# Patient Record
Sex: FEMALE | Race: WHITE | Employment: PART TIME | URBAN - METROPOLITAN AREA
[De-identification: names, ages, dates, MRNs, and addresses within clinical notes are randomized per-mention and may not be internally consistent; named-entity substitution may affect disease eponyms.]

---

## 2017-06-22 PROBLEM — I83.899 VARICOSE VEINS OF LOWER EXTREMITIES WITH COMPLICATIONS: Status: ACTIVE | Noted: 2017-06-22

## 2017-06-22 PROBLEM — I87.309 VENOUS HYPERTENSION OF LOWER EXTREMITY: Status: ACTIVE | Noted: 2017-06-22

## 2017-08-08 PROBLEM — I83.892 VARICOSE VEINS OF LEFT LOWER EXTREMITY WITH COMPLICATIONS: Status: ACTIVE | Noted: 2017-08-08

## 2018-02-15 PROBLEM — Z98.890 STATUS POST PHLEBECTOMY: Status: ACTIVE | Noted: 2018-02-15

## 2019-01-10 ENCOUNTER — HOSPITAL ENCOUNTER (OUTPATIENT)
Dept: GENERAL RADIOLOGY | Age: 49
Discharge: HOME | End: 2019-01-10
Payer: COMMERCIAL

## 2019-01-10 DIAGNOSIS — R13.14 DYSPHAGIA, PHARYNGOESOPHAGEAL: ICD-10-CM

## 2019-01-10 PROCEDURE — 74220 X-RAY XM ESOPHAGUS 1CNTRST: CPT

## 2019-02-20 ENCOUNTER — HOSPITAL ENCOUNTER (EMERGENCY)
Age: 49
Discharge: HOME | End: 2019-02-21
Attending: EMERGENCY MEDICINE
Payer: COMMERCIAL

## 2019-02-20 ENCOUNTER — APPOINTMENT (OUTPATIENT)
Dept: GENERAL RADIOLOGY | Age: 49
End: 2019-02-20
Payer: COMMERCIAL

## 2019-02-20 DIAGNOSIS — R07.9 CHEST PAIN, UNSPECIFIED TYPE: Primary | ICD-10-CM

## 2019-02-20 PROCEDURE — 99285 EMERGENCY DEPT VISIT HI MDM: CPT

## 2019-02-20 PROCEDURE — 93005 ELECTROCARDIOGRAM TRACING: CPT | Performed by: EMERGENCY MEDICINE

## 2019-02-20 PROCEDURE — 71046 X-RAY EXAM CHEST 2 VIEWS: CPT

## 2019-02-20 ASSESSMENT — PAIN SCALES - GENERAL: PAINLEVEL_OUTOF10: 8

## 2019-02-20 ASSESSMENT — PAIN DESCRIPTION - PAIN TYPE: TYPE: ACUTE PAIN

## 2019-02-20 ASSESSMENT — PAIN DESCRIPTION - LOCATION: LOCATION: CHEST

## 2019-02-21 ENCOUNTER — APPOINTMENT (OUTPATIENT)
Dept: CT IMAGING | Age: 49
End: 2019-02-21
Payer: COMMERCIAL

## 2019-02-21 VITALS
TEMPERATURE: 98 F | BODY MASS INDEX: 31.58 KG/M2 | HEART RATE: 72 BPM | HEIGHT: 64 IN | OXYGEN SATURATION: 96 % | RESPIRATION RATE: 13 BRPM | DIASTOLIC BLOOD PRESSURE: 96 MMHG | WEIGHT: 185 LBS | SYSTOLIC BLOOD PRESSURE: 162 MMHG

## 2019-02-21 LAB
ALBUMIN SERPL-MCNC: 3.5 GM/DL (ref 3.4–5)
ALP BLD-CCNC: 103 IU/L (ref 40–128)
ALT SERPL-CCNC: 13 U/L (ref 10–40)
ANION GAP SERPL CALCULATED.3IONS-SCNC: 10 MMOL/L (ref 4–16)
AST SERPL-CCNC: 10 IU/L (ref 15–37)
BASOPHILS ABSOLUTE: 0.1 K/CU MM
BASOPHILS RELATIVE PERCENT: 0.5 % (ref 0–1)
BILIRUB SERPL-MCNC: 0.3 MG/DL (ref 0–1)
BUN BLDV-MCNC: 15 MG/DL (ref 6–23)
CALCIUM SERPL-MCNC: 8.6 MG/DL (ref 8.3–10.6)
CHLORIDE BLD-SCNC: 101 MMOL/L (ref 99–110)
CO2: 24 MMOL/L (ref 21–32)
CREAT SERPL-MCNC: 0.6 MG/DL (ref 0.6–1.1)
DIFFERENTIAL TYPE: ABNORMAL
EOSINOPHILS ABSOLUTE: 0.2 K/CU MM
EOSINOPHILS RELATIVE PERCENT: 1.5 % (ref 0–3)
GFR AFRICAN AMERICAN: >60 ML/MIN/1.73M2
GFR NON-AFRICAN AMERICAN: >60 ML/MIN/1.73M2
GLUCOSE BLD-MCNC: 322 MG/DL (ref 70–99)
HCT VFR BLD CALC: 39.2 % (ref 37–47)
HEMOGLOBIN: 12.4 GM/DL (ref 12.5–16)
IMMATURE NEUTROPHIL %: 0.3 % (ref 0–0.43)
LYMPHOCYTES ABSOLUTE: 3.2 K/CU MM
LYMPHOCYTES RELATIVE PERCENT: 29.5 % (ref 24–44)
MAGNESIUM: 1.6 MG/DL (ref 1.8–2.4)
MCH RBC QN AUTO: 27.1 PG (ref 27–31)
MCHC RBC AUTO-ENTMCNC: 31.6 % (ref 32–36)
MCV RBC AUTO: 85.6 FL (ref 78–100)
MONOCYTES ABSOLUTE: 1 K/CU MM
MONOCYTES RELATIVE PERCENT: 8.7 % (ref 0–4)
NUCLEATED RBC %: 0 %
PDW BLD-RTO: 14.1 % (ref 11.7–14.9)
PLATELET # BLD: 328 K/CU MM (ref 140–440)
PMV BLD AUTO: 9.1 FL (ref 7.5–11.1)
POTASSIUM SERPL-SCNC: 3.7 MMOL/L (ref 3.5–5.1)
RBC # BLD: 4.58 M/CU MM (ref 4.2–5.4)
SEGMENTED NEUTROPHILS ABSOLUTE COUNT: 6.5 K/CU MM
SEGMENTED NEUTROPHILS RELATIVE PERCENT: 59.5 % (ref 36–66)
SODIUM BLD-SCNC: 135 MMOL/L (ref 135–145)
TOTAL IMMATURE NEUTOROPHIL: 0.03 K/CU MM
TOTAL NUCLEATED RBC: 0 K/CU MM
TOTAL PROTEIN: 6.2 GM/DL (ref 6.4–8.2)
TROPONIN T: <0.01 NG/ML
TROPONIN T: <0.01 NG/ML
TSH HIGH SENSITIVITY: 0.82 UIU/ML (ref 0.27–4.2)
WBC # BLD: 10.9 K/CU MM (ref 4–10.5)

## 2019-02-21 PROCEDURE — 93010 ELECTROCARDIOGRAM REPORT: CPT | Performed by: INTERNAL MEDICINE

## 2019-02-21 PROCEDURE — 83735 ASSAY OF MAGNESIUM: CPT

## 2019-02-21 PROCEDURE — 85025 COMPLETE CBC W/AUTO DIFF WBC: CPT

## 2019-02-21 PROCEDURE — 84443 ASSAY THYROID STIM HORMONE: CPT

## 2019-02-21 PROCEDURE — 71275 CT ANGIOGRAPHY CHEST: CPT

## 2019-02-21 PROCEDURE — 36415 COLL VENOUS BLD VENIPUNCTURE: CPT

## 2019-02-21 PROCEDURE — 6360000004 HC RX CONTRAST MEDICATION: Performed by: EMERGENCY MEDICINE

## 2019-02-21 PROCEDURE — 80053 COMPREHEN METABOLIC PANEL: CPT

## 2019-02-21 PROCEDURE — 84484 ASSAY OF TROPONIN QUANT: CPT

## 2019-02-21 RX ORDER — NICOTINE 21 MG/24HR
1 PATCH, TRANSDERMAL 24 HOURS TRANSDERMAL DAILY
Qty: 45 PATCH | Refills: 0 | Status: SHIPPED | OUTPATIENT
Start: 2019-02-21 | End: 2020-12-16

## 2019-02-21 RX ADMIN — IOPAMIDOL 100 ML: 755 INJECTION, SOLUTION INTRAVENOUS at 00:58

## 2019-02-21 ASSESSMENT — HEART SCORE: ECG: 0

## 2019-02-25 LAB
EKG ATRIAL RATE: 89 BPM
EKG DIAGNOSIS: NORMAL
EKG P-R INTERVAL: 200 MS
EKG Q-T INTERVAL: 362 MS
EKG QRS DURATION: 88 MS
EKG QTC CALCULATION (BAZETT): 440 MS
EKG R AXIS: 7 DEGREES
EKG T AXIS: 17 DEGREES
EKG VENTRICULAR RATE: 89 BPM

## 2019-06-28 ENCOUNTER — HOSPITAL ENCOUNTER (EMERGENCY)
Age: 49
Discharge: HOME | End: 2019-06-28
Payer: COMMERCIAL

## 2019-06-28 ENCOUNTER — APPOINTMENT (OUTPATIENT)
Dept: ULTRASOUND IMAGING | Age: 49
End: 2019-06-28
Payer: COMMERCIAL

## 2019-06-28 VITALS
HEIGHT: 64 IN | OXYGEN SATURATION: 100 % | TEMPERATURE: 98.2 F | SYSTOLIC BLOOD PRESSURE: 156 MMHG | DIASTOLIC BLOOD PRESSURE: 79 MMHG | BODY MASS INDEX: 31.58 KG/M2 | RESPIRATION RATE: 16 BRPM | WEIGHT: 185 LBS | HEART RATE: 72 BPM

## 2019-06-28 DIAGNOSIS — I83.92 VARICOSE VEINS OF LEFT LOWER EXTREMITY, UNSPECIFIED WHETHER COMPLICATED: Primary | ICD-10-CM

## 2019-06-28 PROCEDURE — 93971 EXTREMITY STUDY: CPT

## 2019-06-28 PROCEDURE — 99283 EMERGENCY DEPT VISIT LOW MDM: CPT

## 2019-06-28 ASSESSMENT — PAIN DESCRIPTION - LOCATION: LOCATION: LEG

## 2019-06-28 ASSESSMENT — PAIN DESCRIPTION - PAIN TYPE: TYPE: ACUTE PAIN

## 2019-06-28 ASSESSMENT — PAIN SCALES - GENERAL: PAINLEVEL_OUTOF10: 8

## 2019-06-28 ASSESSMENT — PAIN DESCRIPTION - ORIENTATION: ORIENTATION: LEFT

## 2019-06-28 NOTE — ED PROVIDER NOTES
Worry: Not on file     Inability: Not on file    Transportation needs:     Medical: Not on file     Non-medical: Not on file   Tobacco Use    Smoking status: Never Smoker    Smokeless tobacco: Never Used   Substance and Sexual Activity    Alcohol use: Yes     Comment: occasionally    Drug use: No    Sexual activity: Not on file   Lifestyle    Physical activity:     Days per week: Not on file     Minutes per session: Not on file    Stress: Not on file   Relationships    Social connections:     Talks on phone: Not on file     Gets together: Not on file     Attends Yazdanism service: Not on file     Active member of club or organization: Not on file     Attends meetings of clubs or organizations: Not on file     Relationship status: Not on file    Intimate partner violence:     Fear of current or ex partner: Not on file     Emotionally abused: Not on file     Physically abused: Not on file     Forced sexual activity: Not on file   Other Topics Concern    Not on file   Social History Narrative    Not on file     No current facility-administered medications for this encounter. Current Outpatient Medications   Medication Sig Dispense Refill    nicotine (NICODERM CQ) 21 MG/24HR Place 1 patch onto the skin daily 45 patch 0    TRULICITY 1.5 MB/6.8MA SOPN       acyclovir (ZOVIRAX) 400 MG tablet       magnesium oxide (MAG-OX) 400 MG tablet Take 400 mg by mouth daily      potassium chloride SA (K-DUR;KLOR-CON M) 20 MEQ tablet Take 20 mEq by mouth 2 times daily      vitamin D (MAXIMUM D3) 75545 UNITS CAPS capsule Take 10,000 Units by mouth once a week      butalbital-acetaminophen-caffeine (FIORICET) per tablet Take 1 tablet by mouth every 4 hours as needed for Pain. 180 tablet 0    naproxen sodium (ANAPROX) 550 MG tablet Take 550 mg by mouth 2 times daily (with meals).        Allergies   Allergen Reactions    Nubain [Nalbuphine Hcl]      Seizures      Secobarbital Sodium Anaphylaxis     SEIZURE    ablation and multiple phlebectomies FINDINGS: The visualized veins of the left lower extremity are patent and free of echogenic thrombus. The veins are normally compressible and have normal phasic flow. No evidence of DVT in the left lower extremity. MDM:  -  Patient seen and evaluated in the emergency department. -  Triage and nursing notes reviewed and incorporated. -  Old chart records reviewed and incorporated. -  Supervising physician was Dr. Constantin Velásquez. Patient was seen independently. -  Differential diagnosis includes:  DVT, superficial thrombophlebitis, cellulitis, Baker's cyst, lymphedema, aneurysm, hematoma, muscle strain, and others  -  Work-up included:  US  -  Results discussed with patient. Negative US. No evidence of infectious process. Compartments soft. We discussed FU with Dr. Jenise Wills in 1-2 days, return here as needed. She is agreeable with plan of care and disposition.  -  Disposition:  Home    Clinical Impression:  1.  Varicose veins of left lower extremity, unspecified whether complicated      (Please note that portions of this note may have been completed with a voice recognition program. Efforts were made to edit the dictations but occasionally words are mis-transcribed.)    TETE Saravia PA-C  06/28/19 5642

## 2019-07-13 PROBLEM — I80.02 SUPERFICIAL PHLEBITIS OF LEFT LEG: Status: ACTIVE | Noted: 2019-07-13

## 2020-08-09 ASSESSMENT — PAIN SCALES - GENERAL: PAINLEVEL_OUTOF10: 8

## 2020-08-09 ASSESSMENT — PAIN DESCRIPTION - LOCATION: LOCATION: CHEST

## 2020-08-09 ASSESSMENT — PAIN DESCRIPTION - PAIN TYPE: TYPE: ACUTE PAIN

## 2020-08-10 ENCOUNTER — HOSPITAL ENCOUNTER (EMERGENCY)
Age: 50
Discharge: HOME | End: 2020-08-10
Attending: EMERGENCY MEDICINE
Payer: COMMERCIAL

## 2020-08-10 ENCOUNTER — APPOINTMENT (OUTPATIENT)
Dept: CT IMAGING | Age: 50
End: 2020-08-10
Payer: COMMERCIAL

## 2020-08-10 ENCOUNTER — APPOINTMENT (OUTPATIENT)
Dept: GENERAL RADIOLOGY | Age: 50
End: 2020-08-10
Payer: COMMERCIAL

## 2020-08-10 VITALS
SYSTOLIC BLOOD PRESSURE: 178 MMHG | TEMPERATURE: 98.1 F | HEART RATE: 104 BPM | RESPIRATION RATE: 14 BRPM | OXYGEN SATURATION: 94 % | BODY MASS INDEX: 34.15 KG/M2 | HEIGHT: 64 IN | WEIGHT: 200 LBS | DIASTOLIC BLOOD PRESSURE: 99 MMHG

## 2020-08-10 LAB
ALBUMIN SERPL-MCNC: 4.3 GM/DL (ref 3.4–5)
ALP BLD-CCNC: 117 IU/L (ref 40–128)
ALT SERPL-CCNC: 22 U/L (ref 10–40)
ANION GAP SERPL CALCULATED.3IONS-SCNC: 11 MMOL/L (ref 4–16)
AST SERPL-CCNC: 18 IU/L (ref 15–37)
BASOPHILS ABSOLUTE: 0.1 K/CU MM
BASOPHILS RELATIVE PERCENT: 0.7 % (ref 0–1)
BILIRUB SERPL-MCNC: 0.5 MG/DL (ref 0–1)
BUN BLDV-MCNC: 10 MG/DL (ref 6–23)
CALCIUM SERPL-MCNC: 9.3 MG/DL (ref 8.3–10.6)
CHLORIDE BLD-SCNC: 96 MMOL/L (ref 99–110)
CO2: 24 MMOL/L (ref 21–32)
CREAT SERPL-MCNC: 0.6 MG/DL (ref 0.6–1.1)
DIFFERENTIAL TYPE: ABNORMAL
EOSINOPHILS ABSOLUTE: 0.1 K/CU MM
EOSINOPHILS RELATIVE PERCENT: 1.2 % (ref 0–3)
GFR AFRICAN AMERICAN: >60 ML/MIN/1.73M2
GFR NON-AFRICAN AMERICAN: >60 ML/MIN/1.73M2
GLUCOSE BLD-MCNC: 243 MG/DL (ref 70–99)
HCT VFR BLD CALC: 44.4 % (ref 37–47)
HEMOGLOBIN: 14.3 GM/DL (ref 12.5–16)
IMMATURE NEUTROPHIL %: 0.5 % (ref 0–0.43)
LYMPHOCYTES ABSOLUTE: 3.1 K/CU MM
LYMPHOCYTES RELATIVE PERCENT: 25.1 % (ref 24–44)
MCH RBC QN AUTO: 27.4 PG (ref 27–31)
MCHC RBC AUTO-ENTMCNC: 32.2 % (ref 32–36)
MCV RBC AUTO: 85.2 FL (ref 78–100)
MONOCYTES ABSOLUTE: 0.8 K/CU MM
MONOCYTES RELATIVE PERCENT: 6.9 % (ref 0–4)
NUCLEATED RBC %: 0 %
PDW BLD-RTO: 13.6 % (ref 11.7–14.9)
PLATELET # BLD: 369 K/CU MM (ref 140–440)
PMV BLD AUTO: 9.7 FL (ref 7.5–11.1)
POTASSIUM SERPL-SCNC: 4.2 MMOL/L (ref 3.5–5.1)
PRO-BNP: 83.41 PG/ML
RBC # BLD: 5.21 M/CU MM (ref 4.2–5.4)
SEGMENTED NEUTROPHILS ABSOLUTE COUNT: 8 K/CU MM
SEGMENTED NEUTROPHILS RELATIVE PERCENT: 65.6 % (ref 36–66)
SODIUM BLD-SCNC: 131 MMOL/L (ref 135–145)
TOTAL IMMATURE NEUTOROPHIL: 0.06 K/CU MM
TOTAL NUCLEATED RBC: 0 K/CU MM
TOTAL PROTEIN: 7.3 GM/DL (ref 6.4–8.2)
TROPONIN T: <0.01 NG/ML
WBC # BLD: 12.1 K/CU MM (ref 4–10.5)

## 2020-08-10 PROCEDURE — 71275 CT ANGIOGRAPHY CHEST: CPT

## 2020-08-10 PROCEDURE — 93010 ELECTROCARDIOGRAM REPORT: CPT | Performed by: INTERNAL MEDICINE

## 2020-08-10 PROCEDURE — 71046 X-RAY EXAM CHEST 2 VIEWS: CPT

## 2020-08-10 PROCEDURE — 6360000004 HC RX CONTRAST MEDICATION: Performed by: EMERGENCY MEDICINE

## 2020-08-10 PROCEDURE — 85025 COMPLETE CBC W/AUTO DIFF WBC: CPT

## 2020-08-10 PROCEDURE — 93005 ELECTROCARDIOGRAM TRACING: CPT | Performed by: EMERGENCY MEDICINE

## 2020-08-10 PROCEDURE — 84484 ASSAY OF TROPONIN QUANT: CPT

## 2020-08-10 PROCEDURE — 83880 ASSAY OF NATRIURETIC PEPTIDE: CPT

## 2020-08-10 PROCEDURE — 80053 COMPREHEN METABOLIC PANEL: CPT

## 2020-08-10 PROCEDURE — 99285 EMERGENCY DEPT VISIT HI MDM: CPT

## 2020-08-10 PROCEDURE — 6370000000 HC RX 637 (ALT 250 FOR IP): Performed by: EMERGENCY MEDICINE

## 2020-08-10 RX ORDER — ASPIRIN 81 MG/1
81 TABLET, CHEWABLE ORAL ONCE
Status: COMPLETED | OUTPATIENT
Start: 2020-08-10 | End: 2020-08-10

## 2020-08-10 RX ORDER — CYCLOBENZAPRINE HCL 10 MG
10 TABLET ORAL 3 TIMES DAILY PRN
Qty: 20 TABLET | Refills: 0 | Status: SHIPPED | OUTPATIENT
Start: 2020-08-10 | End: 2020-08-17

## 2020-08-10 RX ORDER — SODIUM CHLORIDE 0.9 % (FLUSH) 0.9 %
10 SYRINGE (ML) INJECTION 2 TIMES DAILY
Status: DISCONTINUED | OUTPATIENT
Start: 2020-08-10 | End: 2020-08-10 | Stop reason: HOSPADM

## 2020-08-10 RX ADMIN — ASPIRIN 81 MG CHEWABLE TABLET 81 MG: 81 TABLET CHEWABLE at 03:10

## 2020-08-10 RX ADMIN — IOPAMIDOL 100 ML: 755 INJECTION, SOLUTION INTRAVENOUS at 04:45

## 2020-08-10 ASSESSMENT — ENCOUNTER SYMPTOMS
HEARTBURN: 0
EYE PAIN: 0
APNEA: 0
WHEEZING: 0
DIARRHEA: 0
ABDOMINAL PAIN: 0
VOMITING: 0
EYE REDNESS: 0
TROUBLE SWALLOWING: 0
BACK PAIN: 0
SHORTNESS OF BREATH: 0
VOICE CHANGE: 0
CHOKING: 0
RHINORRHEA: 0
GASTROINTESTINAL NEGATIVE: 1
EYE ITCHING: 0
COLOR CHANGE: 0
SINUS PAIN: 0
EYE DISCHARGE: 0
BLOOD IN STOOL: 0
CHEST TIGHTNESS: 0
EYES NEGATIVE: 1
CONSTIPATION: 0
PHOTOPHOBIA: 0
SINUS PRESSURE: 0
RECTAL PAIN: 0
COUGH: 0
STRIDOR: 0
NAUSEA: 0
RESPIRATORY NEGATIVE: 1
FACIAL SWELLING: 0
ORTHOPNEA: 0

## 2020-08-10 NOTE — ED PROVIDER NOTES
HENT: Negative. Negative for congestion, dental problem, drooling, facial swelling, nosebleeds, postnasal drip, rhinorrhea, sinus pressure, sinus pain, tinnitus, trouble swallowing and voice change. Eyes: Negative. Negative for photophobia, pain, discharge, redness, itching and visual disturbance. Respiratory: Negative. Negative for apnea, cough, choking, chest tightness, shortness of breath, wheezing and stridor. Cardiovascular: Positive for chest pain. Negative for palpitations, orthopnea, claudication, leg swelling, syncope and PND. Gastrointestinal: Negative. Negative for abdominal pain, anorexia, blood in stool, constipation, diarrhea, heartburn, nausea, rectal pain and vomiting. Endocrine: Negative for polyuria. Genitourinary: Negative. Negative for dysuria, flank pain, frequency, hematuria and urgency. Musculoskeletal: Negative. Negative for arthralgias, back pain, gait problem, myalgias, neck pain and neck stiffness. Skin: Negative. Negative for color change, pallor, rash and wound. Neurological: Negative. Negative for dizziness, speech difficulty, weakness, light-headedness, numbness and headaches. Psychiatric/Behavioral: Negative. Negative for agitation, confusion, self-injury, sleep disturbance and suicidal ideas. The patient is not nervous/anxious. All other systems reviewed and are negative. Except as noted above the remainder of the review of systems was reviewed and negative. PAST MEDICAL HISTORY     Past Medical History:   Diagnosis Date    Arthritis     Asthma     Diabetes mellitus ([facility] [address]     Ehler's-Danlos syndrome     Fibromyalgia     Middle cerebral aneurysm     Raynauds syndrome        Prior to Admission medications    Medication Sig Start Date End Date Taking?  Authorizing Provider   cyclobenzaprine (FLEXERIL) 10 MG tablet Take 1 tablet by mouth 3 times daily as needed for Muscle spasms 8/10/20 8/17/20 Yes Flora Vogel, DO   nicotine (K-DUR;KLOR-CON M) 20 MEQ TABLET    Take 20 mEq by mouth 2 times daily    TRULICITY 1.5 PU/0.8FN SOPN        VITAMIN D (MAXIMUM D3) 89841 UNITS CAPS CAPSULE    Take 10,000 Units by mouth once a week       ALLERGIES     Nubain [nalbuphine hcl]; Secobarbital sodium; Sulfa antibiotics; Demerol; and Indocin [indomethacin]    FAMILY HISTORY     History reviewed. No pertinent family history. SOCIAL HISTORY       Social History     Socioeconomic History    Marital status:      Spouse name: None    Number of children: None    Years of education: None    Highest education level: None   Occupational History    None   Social Needs    Financial resource strain: None    Food insecurity     Worry: None     Inability: None    Transportation needs     Medical: None     Non-medical: None   Tobacco Use    Smoking status: Never Smoker    Smokeless tobacco: Never Used   Substance and Sexual Activity    Alcohol use: Yes     Comment: occasionally    Drug use: No    Sexual activity: None   Lifestyle    Physical activity     Days per week: None     Minutes per session: None    Stress: None   Relationships    Social connections     Talks on phone: None     Gets together: None     Attends Quaker service: None     Active member of club or organization: None     Attends meetings of clubs or organizations: None     Relationship status: None    Intimate partner violence     Fear of current or ex partner: None     Emotionally abused: None     Physically abused: None     Forced sexual activity: None   Other Topics Concern    None   Social History Narrative    None       SCREENINGS               PHYSICAL EXAM    (up to 7 for level 4, 8 or more for level 5)     ED Triage Vitals [08/10/20 0000]   BP Temp Temp Source Pulse Resp SpO2 Height Weight   (!) 178/99 98.1 °F (36.7 °C) Oral 104 14 94 % 5' 4\" (1.626 m) 200 lb (90.7 kg)       Physical Exam  Vitals signs and nursing note reviewed.    Constitutional: Glucose 243 (*)     All other components within normal limits   TROPONIN   BRAIN NATRIURETIC PEPTIDE       All other labs were within normal range or not returned as of this dictation. EMERGENCY DEPARTMENT COURSE and DIFFERENTIAL DIAGNOSIS/MDM:   Vitals:    Vitals:    08/10/20 0000   BP: (!) 178/99   Pulse: 104   Resp: 14   Temp: 98.1 °F (36.7 °C)   TempSrc: Oral   SpO2: 94%   Weight: 200 lb (90.7 kg)   Height: 5' 4\" (1.626 m)           MDM  Number of Diagnoses or Management Options  Diagnosis management comments: 59-year-old female presents emergency department chief complaint of chest pain that started earlier in the day. Lab work is unremarkable. CT of the chest is also negative. 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 Chest pain is atypical in nature. Patient's heart score is 2. Will discharge patient to home with return precautions and outpatient cardiology referral.       Amount and/or Complexity of Data Reviewed  Clinical lab tests: ordered and reviewed  Tests in the radiology section of CPT®: reviewed and ordered  Tests in the medicine section of CPT®: ordered and reviewed    Risk of Complications, Morbidity, and/or Mortality  Presenting problems: moderate  Diagnostic procedures: moderate  Management options: moderate    Critical Care  Total time providing critical care: < 30 minutes    Patient Progress  Patient progress: improved        REASSESSMENT          CRITICAL CARE TIME     Total critical care time provided today was 0 minutes. This excludes seperately billable procedures and family discussion time. Critical care time provided for obtaining history, conducting a physical exam, performing and monitoring interventions, ordering, collecting and interpreting tests, and establishing medical decision-making.   There was a potential for life/limb threatening pathology requiring close evaluation and intervention with concern for patient Efforts were made to edit the dictations but occasionally words are mis-transcribed.)    Seema Caceres DO (electronically signed)  Attending Emergency Physician            Seema Caceres DO  08/10/20 4023

## 2020-08-17 LAB
EKG ATRIAL RATE: 98 BPM
EKG DIAGNOSIS: NORMAL
EKG P AXIS: 44 DEGREES
EKG P-R INTERVAL: 152 MS
EKG Q-T INTERVAL: 354 MS
EKG QRS DURATION: 88 MS
EKG QTC CALCULATION (BAZETT): 451 MS
EKG R AXIS: 3 DEGREES
EKG T AXIS: 14 DEGREES
EKG VENTRICULAR RATE: 98 BPM

## 2020-10-15 ENCOUNTER — APPOINTMENT (OUTPATIENT)
Dept: CT IMAGING | Age: 50
End: 2020-10-15
Payer: COMMERCIAL

## 2020-10-15 ENCOUNTER — HOSPITAL ENCOUNTER (EMERGENCY)
Age: 50
Discharge: HOME | End: 2020-10-15
Attending: EMERGENCY MEDICINE
Payer: COMMERCIAL

## 2020-10-15 ENCOUNTER — APPOINTMENT (OUTPATIENT)
Dept: GENERAL RADIOLOGY | Age: 50
End: 2020-10-15
Payer: COMMERCIAL

## 2020-10-15 VITALS
TEMPERATURE: 98.1 F | SYSTOLIC BLOOD PRESSURE: 221 MMHG | DIASTOLIC BLOOD PRESSURE: 112 MMHG | RESPIRATION RATE: 22 BRPM | HEART RATE: 100 BPM | OXYGEN SATURATION: 98 %

## 2020-10-15 PROCEDURE — 70450 CT HEAD/BRAIN W/O DYE: CPT

## 2020-10-15 PROCEDURE — 96375 TX/PRO/DX INJ NEW DRUG ADDON: CPT

## 2020-10-15 PROCEDURE — 6360000002 HC RX W HCPCS: Performed by: EMERGENCY MEDICINE

## 2020-10-15 PROCEDURE — 73564 X-RAY EXAM KNEE 4 OR MORE: CPT

## 2020-10-15 PROCEDURE — 99284 EMERGENCY DEPT VISIT MOD MDM: CPT

## 2020-10-15 PROCEDURE — 73030 X-RAY EXAM OF SHOULDER: CPT

## 2020-10-15 PROCEDURE — 96374 THER/PROPH/DIAG INJ IV PUSH: CPT

## 2020-10-15 PROCEDURE — 72125 CT NECK SPINE W/O DYE: CPT

## 2020-10-15 PROCEDURE — 96376 TX/PRO/DX INJ SAME DRUG ADON: CPT

## 2020-10-15 PROCEDURE — 71045 X-RAY EXAM CHEST 1 VIEW: CPT

## 2020-10-15 RX ORDER — ONDANSETRON 2 MG/ML
4 INJECTION INTRAMUSCULAR; INTRAVENOUS EVERY 6 HOURS PRN
Status: DISCONTINUED | OUTPATIENT
Start: 2020-10-15 | End: 2020-10-16 | Stop reason: HOSPADM

## 2020-10-15 RX ORDER — METHOCARBAMOL 500 MG/1
500 TABLET, FILM COATED ORAL 4 TIMES DAILY
Qty: 40 TABLET | Refills: 0 | Status: SHIPPED | OUTPATIENT
Start: 2020-10-15 | End: 2020-10-25

## 2020-10-15 RX ORDER — NAPROXEN 500 MG/1
500 TABLET ORAL 2 TIMES DAILY
Qty: 60 TABLET | Refills: 0 | Status: SHIPPED | OUTPATIENT
Start: 2020-10-15 | End: 2022-04-05 | Stop reason: SDUPTHER

## 2020-10-15 RX ORDER — HYDROCODONE BITARTRATE AND ACETAMINOPHEN 5; 325 MG/1; MG/1
1 TABLET ORAL EVERY 6 HOURS PRN
Qty: 10 TABLET | Refills: 0 | Status: SHIPPED | OUTPATIENT
Start: 2020-10-15 | End: 2020-10-18

## 2020-10-15 RX ORDER — FENTANYL CITRATE 50 UG/ML
50 INJECTION, SOLUTION INTRAMUSCULAR; INTRAVENOUS
Status: COMPLETED | OUTPATIENT
Start: 2020-10-15 | End: 2020-10-15

## 2020-10-15 RX ADMIN — FENTANYL CITRATE 50 MCG: 50 INJECTION INTRAMUSCULAR; INTRAVENOUS at 21:38

## 2020-10-15 RX ADMIN — ONDANSETRON 4 MG: 2 INJECTION INTRAMUSCULAR; INTRAVENOUS at 21:36

## 2020-10-15 RX ADMIN — FENTANYL CITRATE 50 MCG: 50 INJECTION INTRAMUSCULAR; INTRAVENOUS at 22:42

## 2020-10-15 ASSESSMENT — PAIN SCALES - GENERAL
PAINLEVEL_OUTOF10: 10
PAINLEVEL_OUTOF10: 9
PAINLEVEL_OUTOF10: 10

## 2020-10-15 ASSESSMENT — PAIN DESCRIPTION - PAIN TYPE: TYPE: ACUTE PAIN

## 2020-10-15 ASSESSMENT — PAIN DESCRIPTION - LOCATION: LOCATION: KNEE

## 2020-10-15 ASSESSMENT — PAIN DESCRIPTION - ORIENTATION: ORIENTATION: LEFT
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
## 2020-10-16 NOTE — ED NOTES
at bedside assessing pt. Pt arrived in c-collar per EMS. Pt c/o left knee pain, neck/head.  Pt is alert and oriented      Zaria Aguirre RN  10/15/20 2126
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
## 2020-11-05 ENCOUNTER — HOSPITAL ENCOUNTER (OUTPATIENT)
Dept: PHYSICAL THERAPY | Age: 50
Setting detail: THERAPIES SERIES
Discharge: HOME | End: 2020-11-05
Payer: COMMERCIAL

## 2020-11-05 PROCEDURE — 97140 MANUAL THERAPY 1/> REGIONS: CPT

## 2020-11-05 PROCEDURE — 97162 PT EVAL MOD COMPLEX 30 MIN: CPT

## 2020-11-05 PROCEDURE — 97110 THERAPEUTIC EXERCISES: CPT

## 2020-11-05 ASSESSMENT — PAIN SCALES - GENERAL: PAINLEVEL_OUTOF10: 6
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
## 2020-11-09 ENCOUNTER — HOSPITAL ENCOUNTER (OUTPATIENT)
Dept: PHYSICAL THERAPY | Age: 50
Setting detail: THERAPIES SERIES
Discharge: HOME | End: 2020-11-09
Payer: COMMERCIAL

## 2020-11-09 PROCEDURE — 20561 NDL INSJ W/O NJX 3+ MUSC: CPT

## 2020-11-09 PROCEDURE — 97110 THERAPEUTIC EXERCISES: CPT
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
## 2020-11-11 ENCOUNTER — HOSPITAL ENCOUNTER (OUTPATIENT)
Dept: PHYSICAL THERAPY | Age: 50
Setting detail: THERAPIES SERIES
Discharge: HOME | End: 2020-11-11
Payer: COMMERCIAL

## 2020-11-11 PROCEDURE — 20561 NDL INSJ W/O NJX 3+ MUSC: CPT

## 2020-11-11 PROCEDURE — 97140 MANUAL THERAPY 1/> REGIONS: CPT
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
## 2020-11-16 ENCOUNTER — HOSPITAL ENCOUNTER (OUTPATIENT)
Dept: PHYSICAL THERAPY | Age: 50
Setting detail: THERAPIES SERIES
Discharge: HOME | End: 2020-11-16
Payer: COMMERCIAL

## 2020-11-16 PROCEDURE — 97140 MANUAL THERAPY 1/> REGIONS: CPT

## 2020-11-16 PROCEDURE — 20561 NDL INSJ W/O NJX 3+ MUSC: CPT
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
## 2020-11-23 ENCOUNTER — HOSPITAL ENCOUNTER (OUTPATIENT)
Dept: PHYSICAL THERAPY | Age: 50
Discharge: HOME | End: 2020-11-23

## 2020-11-25 ENCOUNTER — HOSPITAL ENCOUNTER (OUTPATIENT)
Dept: PHYSICAL THERAPY | Age: 50
Setting detail: THERAPIES SERIES
Discharge: HOME | End: 2020-11-25
Payer: COMMERCIAL

## 2020-11-25 PROCEDURE — 97140 MANUAL THERAPY 1/> REGIONS: CPT

## 2020-11-25 PROCEDURE — 97110 THERAPEUTIC EXERCISES: CPT
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
## 2020-12-07 ENCOUNTER — HOSPITAL ENCOUNTER (OUTPATIENT)
Dept: PHYSICAL THERAPY | Age: 50
Setting detail: THERAPIES SERIES
Discharge: HOME | End: 2020-12-07
Payer: COMMERCIAL

## 2020-12-07 PROCEDURE — 97110 THERAPEUTIC EXERCISES: CPT

## 2020-12-07 PROCEDURE — 97140 MANUAL THERAPY 1/> REGIONS: CPT
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
## 2020-12-09 ENCOUNTER — HOSPITAL ENCOUNTER (OUTPATIENT)
Dept: PHYSICAL THERAPY | Age: 50
Setting detail: THERAPIES SERIES
Discharge: HOME | End: 2020-12-09
Payer: COMMERCIAL

## 2020-12-09 PROCEDURE — 97110 THERAPEUTIC EXERCISES: CPT

## 2020-12-09 PROCEDURE — 97140 MANUAL THERAPY 1/> REGIONS: CPT
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
## 2020-12-14 ENCOUNTER — HOSPITAL ENCOUNTER (OUTPATIENT)
Dept: PHYSICAL THERAPY | Age: 50
Setting detail: THERAPIES SERIES
Discharge: HOME | End: 2020-12-14
Payer: COMMERCIAL

## 2020-12-14 PROCEDURE — 97110 THERAPEUTIC EXERCISES: CPT

## 2020-12-14 PROCEDURE — 97140 MANUAL THERAPY 1/> REGIONS: CPT
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
## 2020-12-16 ENCOUNTER — OFFICE VISIT (OUTPATIENT)
Dept: ORTHOPEDIC SURGERY | Age: 50
End: 2020-12-16
Payer: COMMERCIAL

## 2020-12-16 VITALS
HEIGHT: 64 IN | BODY MASS INDEX: 34.15 KG/M2 | OXYGEN SATURATION: 96 % | RESPIRATION RATE: 16 BRPM | WEIGHT: 200 LBS | HEART RATE: 84 BPM

## 2020-12-16 PROCEDURE — G8427 DOCREV CUR MEDS BY ELIG CLIN: HCPCS | Performed by: PHYSICIAN ASSISTANT

## 2020-12-16 PROCEDURE — 20610 DRAIN/INJ JOINT/BURSA W/O US: CPT | Performed by: PHYSICIAN ASSISTANT

## 2020-12-16 PROCEDURE — G8417 CALC BMI ABV UP PARAM F/U: HCPCS | Performed by: PHYSICIAN ASSISTANT

## 2020-12-16 PROCEDURE — 99242 OFF/OP CONSLTJ NEW/EST SF 20: CPT | Performed by: PHYSICIAN ASSISTANT

## 2020-12-16 PROCEDURE — G8484 FLU IMMUNIZE NO ADMIN: HCPCS | Performed by: PHYSICIAN ASSISTANT

## 2020-12-16 ASSESSMENT — ENCOUNTER SYMPTOMS
RESPIRATORY NEGATIVE: 1
EYES NEGATIVE: 1
GASTROINTESTINAL NEGATIVE: 1
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
## 2020-12-16 NOTE — PATIENT INSTRUCTIONS
Continue to weight bear as tolerated  Continue range of motion  Ice and elevate as needed  Tylenol or Motrin for pain  Injection given today in the office   Rest for 24-48 hours  Follow up in 4 weeks  Please continue to monitor your blood sugar once a day.
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
## 2020-12-17 ENCOUNTER — HOSPITAL ENCOUNTER (OUTPATIENT)
Dept: PHYSICAL THERAPY | Age: 50
Setting detail: THERAPIES SERIES
Discharge: HOME | End: 2020-12-17
Payer: COMMERCIAL

## 2020-12-17 PROCEDURE — 97110 THERAPEUTIC EXERCISES: CPT

## 2020-12-17 PROCEDURE — 97140 MANUAL THERAPY 1/> REGIONS: CPT
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
## 2020-12-21 ENCOUNTER — HOSPITAL ENCOUNTER (OUTPATIENT)
Dept: PHYSICAL THERAPY | Age: 50
Setting detail: THERAPIES SERIES
Discharge: HOME | End: 2020-12-21
Payer: COMMERCIAL

## 2020-12-21 PROCEDURE — 97140 MANUAL THERAPY 1/> REGIONS: CPT

## 2020-12-21 PROCEDURE — 97110 THERAPEUTIC EXERCISES: CPT
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
## 2020-12-24 ENCOUNTER — HOSPITAL ENCOUNTER (OUTPATIENT)
Dept: PHYSICAL THERAPY | Age: 50
Discharge: HOME | End: 2020-12-24

## 2020-12-28 ENCOUNTER — HOSPITAL ENCOUNTER (OUTPATIENT)
Dept: PHYSICAL THERAPY | Age: 50
Setting detail: THERAPIES SERIES
Discharge: HOME | End: 2020-12-28
Payer: COMMERCIAL

## 2020-12-28 PROCEDURE — 97110 THERAPEUTIC EXERCISES: CPT

## 2020-12-28 PROCEDURE — 97140 MANUAL THERAPY 1/> REGIONS: CPT
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
## 2020-12-30 ENCOUNTER — HOSPITAL ENCOUNTER (OUTPATIENT)
Dept: PHYSICAL THERAPY | Age: 50
Setting detail: THERAPIES SERIES
Discharge: HOME | End: 2020-12-30
Payer: COMMERCIAL

## 2020-12-30 PROCEDURE — 97110 THERAPEUTIC EXERCISES: CPT

## 2020-12-30 PROCEDURE — 97140 MANUAL THERAPY 1/> REGIONS: CPT
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
## 2021-01-04 ENCOUNTER — HOSPITAL ENCOUNTER (OUTPATIENT)
Dept: PHYSICAL THERAPY | Age: 51
Setting detail: THERAPIES SERIES
Discharge: HOME | End: 2021-01-04
Payer: COMMERCIAL

## 2021-01-04 ENCOUNTER — OFFICE VISIT (OUTPATIENT)
Dept: ORTHOPEDIC SURGERY | Age: 51
End: 2021-01-04
Payer: COMMERCIAL

## 2021-01-04 VITALS — HEIGHT: 64 IN | HEART RATE: 74 BPM | RESPIRATION RATE: 16 BRPM | BODY MASS INDEX: 34.15 KG/M2 | WEIGHT: 200 LBS

## 2021-01-04 DIAGNOSIS — M75.31 CALCIFIC TENDINITIS OF RIGHT SHOULDER REGION: Primary | ICD-10-CM

## 2021-01-04 PROCEDURE — 97140 MANUAL THERAPY 1/> REGIONS: CPT

## 2021-01-04 PROCEDURE — 1036F TOBACCO NON-USER: CPT | Performed by: PHYSICIAN ASSISTANT

## 2021-01-04 PROCEDURE — G8427 DOCREV CUR MEDS BY ELIG CLIN: HCPCS | Performed by: PHYSICIAN ASSISTANT

## 2021-01-04 PROCEDURE — 3017F COLORECTAL CA SCREEN DOC REV: CPT | Performed by: PHYSICIAN ASSISTANT

## 2021-01-04 PROCEDURE — 97110 THERAPEUTIC EXERCISES: CPT

## 2021-01-04 PROCEDURE — G8417 CALC BMI ABV UP PARAM F/U: HCPCS | Performed by: PHYSICIAN ASSISTANT

## 2021-01-04 PROCEDURE — G8484 FLU IMMUNIZE NO ADMIN: HCPCS | Performed by: PHYSICIAN ASSISTANT

## 2021-01-04 PROCEDURE — 99212 OFFICE O/P EST SF 10 MIN: CPT | Performed by: PHYSICIAN ASSISTANT

## 2021-01-04 ASSESSMENT — ENCOUNTER SYMPTOMS
RESPIRATORY NEGATIVE: 1
EYES NEGATIVE: 1
GASTROINTESTINAL NEGATIVE: 1

## 2021-01-04 NOTE — PROGRESS NOTES
Patient is in the office today for bilateral shoulder pain. Patient received a steroid injection into the bilateral shoulder on 12/16/2020. Patient states that the injection only lasted 2 days after she first received the injection. Patient states that she has noticed increased pain within the anterior part of the shoulder. Patient has been noticing that with PT the shoulder are hurting more. OTC pain relievers are helping with some relief. 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 Patient can not have MRI.

## 2021-01-04 NOTE — PATIENT INSTRUCTIONS
Follow up with Dr. Nicolette Carroll to discuss possible diagnostic arthroscopic surgery on right shoulder due to your inability to get MRI of shoulder.   Range of motion as tolerated   Continue PT
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
## 2021-01-04 NOTE — PROGRESS NOTES
[address] and Sports Medicine      HPI:  Nehemiah Watson is a 48 y.o. female presenting back to the office today for reevaluation after subacromial steroid injection in the bilateral shoulders. She states that the injections helped for couple of days but then she noticed that they wore off. She has been doing physical therapy on the shoulders and that has not been helping. She states the pain is worse with internal rotation and with raising the arms above shoulder level. Pain is a sharp pain in the front of the both shoulders but worse in the right shoulder. She reminded me today that she is unable to get an MRI because of aneurysm clips that she has in her brain which were done years ago. She states that she was told that these clips were compatible but she has no evidence of this and therefore radiology will not let her proceed with MRI. I reviewed and agree with the portions of the HPI, review of systems, vital documentation and plan performed by my staff and have added/addended where appropriate. Past Medical History:   Diagnosis Date    Arthritis     Asthma     Diabetes mellitus ([facility] [address]     Ehler's-Danlos syndrome     Fibromyalgia     Middle cerebral aneurysm     Raynauds syndrome        Past Surgical History:   Procedure Laterality Date    ANKLE SURGERY      BICEPS TENDON REPAIR      in left 5th digit    BRAIN SURGERY       SECTION      NERVE SURGERY      repair left elbow    TONSILLECTOMY      TUBAL LIGATION      VEIN SURGERY         No family history on file.     Social History     Socioeconomic History    Marital status:      Spouse name: None    Number of children: None    Years of education: None    Highest education level: None   Occupational History    None   Social Needs    Financial resource strain: None    Food insecurity     Worry: None     Inability: None    Transportation needs     Medical: None     Non-medical: None   Tobacco Use  Smoking status: Never Smoker    Smokeless tobacco: Never Used   Substance and Sexual Activity    Alcohol use: Yes     Comment: occasionally    Drug use: No    Sexual activity: None   Lifestyle    Physical activity     Days per week: None     Minutes per session: None    Stress: None   Relationships    Social connections     Talks on phone: None     Gets together: None     Attends Sabianist service: None     Active member of club or organization: None     Attends meetings of clubs or organizations: None     Relationship status: None    Intimate partner violence     Fear of current or ex partner: None     Emotionally abused: None     Physically abused: None     Forced sexual activity: None   Other Topics Concern    None   Social History Narrative    None       Current Outpatient Medications   Medication Sig Dispense Refill    naproxen (NAPROSYN) 500 MG tablet Take 1 tablet by mouth 2 times daily 60 tablet 0    TRULICITY 1.5 FJ/7.3VB SOPN       acyclovir (ZOVIRAX) 400 MG tablet       magnesium oxide (MAG-OX) 400 MG tablet Take 400 mg by mouth daily      potassium chloride SA (K-DUR;KLOR-CON M) 20 MEQ tablet Take 20 mEq by mouth 2 times daily      vitamin D (MAXIMUM D3) 30139 UNITS CAPS capsule Take 10,000 Units by mouth once a week      butalbital-acetaminophen-caffeine (FIORICET) per tablet Take 1 tablet by mouth every 4 hours as needed for Pain. 180 tablet 0     No current facility-administered medications for this visit. Allergies   Allergen Reactions    Nubain [Nalbuphine Hcl]      Seizures      Secobarbital Sodium Anaphylaxis     SEIZURE    Sulfa Antibiotics Anaphylaxis    Demerol      seizures    Indocin [Indomethacin]        Vitals:    01/04/21 0808   Pulse: 74   Resp: 16   Weight: 200 lb (90.7 kg)   Height: 5' 4\" (1.626 m)         Review of Systems:   Review of Systems   Constitutional: Negative. HENT: Negative. Eyes: Negative. Respiratory: Negative.     Cardiovascular: Follow up with Dr. Pruitt Record to discuss possible diagnostic arthroscopic surgery on right shoulder due to your inability to get MRI of shoulder. Range of motion as tolerated   Continue PT        *Please note this report has been partially produced using speech recognition Dragon software and may contain errors related to that system including errors in grammar, punctuation, and spelling, as well as words and phrases that may be inappropriate.  If there are any questions or concerns please feel free to contact the dictating provider for clarification

## 2021-01-06 ENCOUNTER — OFFICE VISIT (OUTPATIENT)
Dept: ORTHOPEDIC SURGERY | Age: 51
End: 2021-01-06
Payer: COMMERCIAL

## 2021-01-06 VITALS
HEIGHT: 64 IN | RESPIRATION RATE: 16 BRPM | WEIGHT: 207.2 LBS | BODY MASS INDEX: 35.37 KG/M2 | HEART RATE: 76 BPM | OXYGEN SATURATION: 97 %

## 2021-01-06 DIAGNOSIS — M25.511 ACUTE PAIN OF RIGHT SHOULDER: ICD-10-CM

## 2021-01-06 DIAGNOSIS — M75.31 CALCIFIC TENDINITIS OF RIGHT SHOULDER REGION: Primary | ICD-10-CM

## 2021-01-06 PROCEDURE — G8417 CALC BMI ABV UP PARAM F/U: HCPCS | Performed by: ORTHOPAEDIC SURGERY

## 2021-01-06 PROCEDURE — G8427 DOCREV CUR MEDS BY ELIG CLIN: HCPCS | Performed by: ORTHOPAEDIC SURGERY

## 2021-01-06 PROCEDURE — 99203 OFFICE O/P NEW LOW 30 MIN: CPT | Performed by: ORTHOPAEDIC SURGERY

## 2021-01-06 PROCEDURE — 3017F COLORECTAL CA SCREEN DOC REV: CPT | Performed by: ORTHOPAEDIC SURGERY

## 2021-01-06 PROCEDURE — G8484 FLU IMMUNIZE NO ADMIN: HCPCS | Performed by: ORTHOPAEDIC SURGERY

## 2021-01-06 PROCEDURE — 1036F TOBACCO NON-USER: CPT | Performed by: ORTHOPAEDIC SURGERY

## 2021-01-06 RX ORDER — GLIPIZIDE 10 MG/1
10 TABLET ORAL DAILY
COMMUNITY

## 2021-01-06 RX ORDER — IBUPROFEN 800 MG/1
800 TABLET ORAL EVERY 6 HOURS PRN
COMMUNITY

## 2021-01-06 RX ORDER — TIZANIDINE 4 MG/1
4 TABLET ORAL EVERY 8 HOURS PRN
COMMUNITY

## 2021-01-06 RX ORDER — PIOGLITAZONEHYDROCHLORIDE 45 MG/1
45 TABLET ORAL DAILY
COMMUNITY
End: 2021-09-15

## 2021-01-06 RX ORDER — ROSUVASTATIN CALCIUM 20 MG/1
20 TABLET, COATED ORAL DAILY
COMMUNITY

## 2021-01-06 ASSESSMENT — ENCOUNTER SYMPTOMS
COLOR CHANGE: 0
SHORTNESS OF BREATH: 0
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
## 2021-01-06 NOTE — PATIENT INSTRUCTIONS
Consent signed  Follow up for surgery as scheduled  Call Lia [facility] Surgery Scheduler, with any further questions or concerning pertaining to surgery at [phone]
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
## 2021-01-07 ENCOUNTER — HOSPITAL ENCOUNTER (OUTPATIENT)
Dept: PHYSICAL THERAPY | Age: 51
Discharge: HOME | End: 2021-01-07

## 2021-01-12 ENCOUNTER — HOSPITAL ENCOUNTER (OUTPATIENT)
Dept: PHYSICAL THERAPY | Age: 51
Setting detail: THERAPIES SERIES
Discharge: HOME | End: 2021-01-12
Payer: COMMERCIAL

## 2021-01-12 ENCOUNTER — ANESTHESIA EVENT (OUTPATIENT)
Dept: OPERATING ROOM | Age: 51
End: 2021-01-12
Payer: COMMERCIAL

## 2021-01-12 PROCEDURE — 97110 THERAPEUTIC EXERCISES: CPT

## 2021-01-12 PROCEDURE — 97140 MANUAL THERAPY 1/> REGIONS: CPT

## 2021-01-12 NOTE — PROGRESS NOTES
Outpatient Physical Therapy        [] Phone: [phone]   Fax: [phone]  Physician:   Hilda Sebastian, [facility] , Dr. Giana Gutierrez      From: Mirian Cowden, PT     Patient: Haley Montez                    : 1970    Diagnosis: S13. [facility], [id number], [id number], S23.3xx1, [id number]. Treatment Diagnosis: neck, upper back, B shoulder pain, low back pain. Date: 2021    [x]  Progress Note                []  Discharge Note    Total Visits to date:  15     Cancels/No-shows to date:   3    Subjective:     Going to have surgery 21 for R. Ws really sore after the last appt. C/o pain and pain with shoulder movement, tightness in neck and upper back. Stressed about up coming surgery, what she will do about work after surgery. Still doesn't have her vehicle back yet.         Prominent Objective Findings:     ER:  L 65 , R 35     IR L 45, R:  35  CROM:  Flexion 42 , [id number], Rotation  R:50  L:   64  Shoulder Flexion: L AROM:110 , R supine AROM: 100         Tenderness at post scap mms, upper trap, pectorals. Cervical paraspinals. Pain with impingement signs. Pain limited strength in all directions at shoulder. Limited tolerance to ROM today, pain and guarding with all motions of shoulder. Goal Status:  [] Achieved [] Partially Achieved  [x] Not Achieved          Planned Services:  [x] Therapeutic Exercise    [] Modalities:  [x] Therapeutic Activity     [] Ultrasound  [] Electric Stimulation  [] Gait Training      [] Cervical Traction    [] Lumbar Traction  [x] Neuromuscular Re-education  [] Cold/hotpack [] Iontophoresis  [x] Instruction in HEP      Other:  [x] Manual Therapy       []  Vasopneumatic  [] Self care management                           [] Dry needling trigger point/pain management                ? Frequency/Duration:  # Days per week: [] 1 day # Weeks: [] 1 week [] 4 weeks      [x] 2 days?    [] 2 weeks [x] 5 weeks      [] 3 days   [] 3 weeks [] 6 weeks     Rehab Potential: [] Excellent [x] Good [] Fair  [] Poor     Patient Status: [x] Continue per initial Plan of Care     [] Patient now discharged     [x] Additional visits requested, Please re-certify for additional visits:      Continue after surgery. Electronically signed by:  Yris Plaza PT, 1/12/2021, 8:41 AM    If you have any questions or concerns, please don't hesitate to call.   Thank you for your referral.    Physician Signature:______________________ Date:______ Time: ________  By signing above, therapists plan is approved by physician
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
## 2021-01-13 ENCOUNTER — HOSPITAL ENCOUNTER (OUTPATIENT)
Dept: PREADMISSION TESTING | Age: 51
Discharge: HOME | End: 2021-01-17
Payer: COMMERCIAL

## 2021-01-13 VITALS
TEMPERATURE: 97.7 F | HEIGHT: 64 IN | DIASTOLIC BLOOD PRESSURE: 98 MMHG | HEART RATE: 87 BPM | RESPIRATION RATE: 16 BRPM | SYSTOLIC BLOOD PRESSURE: 158 MMHG | OXYGEN SATURATION: 96 % | WEIGHT: 209.4 LBS | BODY MASS INDEX: 35.75 KG/M2

## 2021-01-13 DIAGNOSIS — M75.31 CALCIFIC TENDINITIS OF RIGHT SHOULDER REGION: ICD-10-CM

## 2021-01-13 LAB
ANION GAP SERPL CALCULATED.3IONS-SCNC: 9 MMOL/L (ref 4–16)
BUN BLDV-MCNC: 11 MG/DL (ref 6–23)
CALCIUM SERPL-MCNC: 9 MG/DL (ref 8.3–10.6)
CHLORIDE BLD-SCNC: 100 MMOL/L (ref 99–110)
CO2: 27 MMOL/L (ref 21–32)
CREAT SERPL-MCNC: 0.8 MG/DL (ref 0.6–1.1)
GFR AFRICAN AMERICAN: >60 ML/MIN/1.73M2
GFR NON-AFRICAN AMERICAN: >60 ML/MIN/1.73M2
GLUCOSE BLD-MCNC: 443 MG/DL (ref 70–99)
HCT VFR BLD CALC: 41.6 % (ref 37–47)
HEMOGLOBIN: 13.5 GM/DL (ref 12.5–16)
MCH RBC QN AUTO: 29 PG (ref 27–31)
MCHC RBC AUTO-ENTMCNC: 32.5 % (ref 32–36)
MCV RBC AUTO: 89.3 FL (ref 78–100)
PDW BLD-RTO: 13.8 % (ref 11.7–14.9)
PLATELET # BLD: 281 K/CU MM (ref 140–440)
PMV BLD AUTO: 9.7 FL (ref 7.5–11.1)
POTASSIUM SERPL-SCNC: 4.4 MMOL/L (ref 3.5–5.1)
RBC # BLD: 4.66 M/CU MM (ref 4.2–5.4)
SODIUM BLD-SCNC: 136 MMOL/L (ref 135–145)
WBC # BLD: 9.4 K/CU MM (ref 4–10.5)

## 2021-01-13 PROCEDURE — 80048 BASIC METABOLIC PNL TOTAL CA: CPT

## 2021-01-13 PROCEDURE — C9803 HOPD COVID-19 SPEC COLLECT: HCPCS

## 2021-01-13 PROCEDURE — U0002 COVID-19 LAB TEST NON-CDC: HCPCS

## 2021-01-13 PROCEDURE — 36415 COLL VENOUS BLD VENIPUNCTURE: CPT

## 2021-01-13 PROCEDURE — 85027 COMPLETE CBC AUTOMATED: CPT

## 2021-01-13 RX ORDER — TRAZODONE HYDROCHLORIDE 100 MG/1
50 TABLET ORAL NIGHTLY PRN
COMMUNITY

## 2021-01-13 RX ORDER — OXYBUTYNIN CHLORIDE 5 MG/1
5 TABLET, EXTENDED RELEASE ORAL 2 TIMES DAILY
COMMUNITY

## 2021-01-13 RX ORDER — LOSARTAN POTASSIUM 100 MG/1
100 TABLET ORAL DAILY
COMMUNITY

## 2021-01-13 RX ORDER — OMEGA-3 FATTY ACIDS CAP DELAYED RELEASE 1000 MG 1000 MG
1000 CAPSULE DELAYED RELEASE ORAL DAILY
COMMUNITY
End: 2021-09-15 | Stop reason: SINTOL

## 2021-01-13 ASSESSMENT — PAIN DESCRIPTION - DESCRIPTORS: DESCRIPTORS: ACHING;THROBBING

## 2021-01-13 ASSESSMENT — PAIN DESCRIPTION - PAIN TYPE: TYPE: ACUTE PAIN

## 2021-01-13 ASSESSMENT — PAIN DESCRIPTION - ORIENTATION: ORIENTATION: RIGHT;LEFT

## 2021-01-13 ASSESSMENT — PAIN DESCRIPTION - LOCATION: LOCATION: SHOULDER

## 2021-01-14 LAB
SARS-COV-2: NOT DETECTED
SOURCE: NORMAL

## 2021-01-18 DIAGNOSIS — M75.31 CALCIFIC TENDINITIS OF RIGHT SHOULDER REGION: Primary | ICD-10-CM

## 2021-01-18 RX ORDER — OXYCODONE HYDROCHLORIDE AND ACETAMINOPHEN 5; 325 MG/1; MG/1
1 TABLET ORAL EVERY 6 HOURS PRN
Qty: 25 TABLET | Refills: 0 | Status: SHIPPED | OUTPATIENT
Start: 2021-01-18 | End: 2021-01-25

## 2021-01-18 RX ORDER — CEPHALEXIN 250 MG/1
250 CAPSULE ORAL 4 TIMES DAILY
Qty: 4 CAPSULE | Refills: 0 | Status: SHIPPED | OUTPATIENT
Start: 2021-01-18 | End: 2021-01-19

## 2021-01-18 NOTE — FLOWSHEET NOTE
Patients Plan of Care was received and signed. Signed POC was scanned and placed in the patients chart.     Daniele Rosas

## 2021-01-19 ENCOUNTER — ANESTHESIA (OUTPATIENT)
Dept: OPERATING ROOM | Age: 51
End: 2021-01-19
Payer: COMMERCIAL

## 2021-01-19 ENCOUNTER — HOSPITAL ENCOUNTER (OUTPATIENT)
Age: 51
Setting detail: OUTPATIENT SURGERY
Discharge: HOME | End: 2021-01-19
Attending: ORTHOPAEDIC SURGERY | Admitting: ORTHOPAEDIC SURGERY
Payer: COMMERCIAL

## 2021-01-19 VITALS
OXYGEN SATURATION: 99 % | DIASTOLIC BLOOD PRESSURE: 128 MMHG | SYSTOLIC BLOOD PRESSURE: 141 MMHG | TEMPERATURE: 97.7 F | RESPIRATION RATE: 15 BRPM

## 2021-01-19 VITALS
RESPIRATION RATE: 16 BRPM | HEART RATE: 71 BPM | TEMPERATURE: 97.2 F | OXYGEN SATURATION: 99 % | DIASTOLIC BLOOD PRESSURE: 68 MMHG | SYSTOLIC BLOOD PRESSURE: 138 MMHG

## 2021-01-19 LAB
GLUCOSE BLD-MCNC: 110 MG/DL (ref 70–99)
GLUCOSE BLD-MCNC: 205 MG/DL (ref 70–99)
PREGNANCY TEST URINE, POC: NEGATIVE

## 2021-01-19 PROCEDURE — 7100000000 HC PACU RECOVERY - FIRST 15 MIN: Performed by: ORTHOPAEDIC SURGERY

## 2021-01-19 PROCEDURE — 29823 SHO ARTHRS SRG XTNSV DBRDMT: CPT | Performed by: PHYSICIAN ASSISTANT

## 2021-01-19 PROCEDURE — 29826 SHO ARTHRS SRG DECOMPRESSION: CPT | Performed by: PHYSICIAN ASSISTANT

## 2021-01-19 PROCEDURE — 82962 GLUCOSE BLOOD TEST: CPT

## 2021-01-19 PROCEDURE — 2580000003 HC RX 258: Performed by: ORTHOPAEDIC SURGERY

## 2021-01-19 PROCEDURE — 29827 SHO ARTHRS SRG RT8TR CUF RPR: CPT | Performed by: ORTHOPAEDIC SURGERY

## 2021-01-19 PROCEDURE — 6360000002 HC RX W HCPCS: Performed by: NURSE ANESTHETIST, CERTIFIED REGISTERED

## 2021-01-19 PROCEDURE — 29824 SHO ARTHRS SRG DSTL CLAVICLC: CPT | Performed by: ORTHOPAEDIC SURGERY

## 2021-01-19 PROCEDURE — 76942 ECHO GUIDE FOR BIOPSY: CPT | Performed by: ANESTHESIOLOGY

## 2021-01-19 PROCEDURE — 3600000014 HC SURGERY LEVEL 4 ADDTL 15MIN: Performed by: ORTHOPAEDIC SURGERY

## 2021-01-19 PROCEDURE — 6360000002 HC RX W HCPCS: Performed by: ANESTHESIOLOGY

## 2021-01-19 PROCEDURE — 7100000011 HC PHASE II RECOVERY - ADDTL 15 MIN: Performed by: ORTHOPAEDIC SURGERY

## 2021-01-19 PROCEDURE — 29826 SHO ARTHRS SRG DECOMPRESSION: CPT | Performed by: ORTHOPAEDIC SURGERY

## 2021-01-19 PROCEDURE — 29827 SHO ARTHRS SRG RT8TR CUF RPR: CPT | Performed by: PHYSICIAN ASSISTANT

## 2021-01-19 PROCEDURE — 29823 SHO ARTHRS SRG XTNSV DBRDMT: CPT | Performed by: ORTHOPAEDIC SURGERY

## 2021-01-19 PROCEDURE — C1762 CONN TISS, HUMAN(INC FASCIA): HCPCS | Performed by: ORTHOPAEDIC SURGERY

## 2021-01-19 PROCEDURE — 6370000000 HC RX 637 (ALT 250 FOR IP): Performed by: ORTHOPAEDIC SURGERY

## 2021-01-19 PROCEDURE — 2500000003 HC RX 250 WO HCPCS: Performed by: NURSE ANESTHETIST, CERTIFIED REGISTERED

## 2021-01-19 PROCEDURE — 7100000001 HC PACU RECOVERY - ADDTL 15 MIN: Performed by: ORTHOPAEDIC SURGERY

## 2021-01-19 PROCEDURE — 2709999900 HC NON-CHARGEABLE SUPPLY: Performed by: ORTHOPAEDIC SURGERY

## 2021-01-19 PROCEDURE — 29824 SHO ARTHRS SRG DSTL CLAVICLC: CPT | Performed by: PHYSICIAN ASSISTANT

## 2021-01-19 PROCEDURE — 81025 URINE PREGNANCY TEST: CPT

## 2021-01-19 PROCEDURE — 3700000000 HC ANESTHESIA ATTENDED CARE: Performed by: ORTHOPAEDIC SURGERY

## 2021-01-19 PROCEDURE — 6360000002 HC RX W HCPCS: Performed by: ORTHOPAEDIC SURGERY

## 2021-01-19 PROCEDURE — 7100000010 HC PHASE II RECOVERY - FIRST 15 MIN: Performed by: ORTHOPAEDIC SURGERY

## 2021-01-19 PROCEDURE — 3700000001 HC ADD 15 MINUTES (ANESTHESIA): Performed by: ORTHOPAEDIC SURGERY

## 2021-01-19 PROCEDURE — 2720000010 HC SURG SUPPLY STERILE: Performed by: ORTHOPAEDIC SURGERY

## 2021-01-19 PROCEDURE — C1713 ANCHOR/SCREW BN/BN,TIS/BN: HCPCS | Performed by: ORTHOPAEDIC SURGERY

## 2021-01-19 PROCEDURE — 3600000004 HC SURGERY LEVEL 4 BASE: Performed by: ORTHOPAEDIC SURGERY

## 2021-01-19 DEVICE — BONE ANCHORS 3 WITH ARTHROSCOPIC DELIVERY SYSTEM ADVANCED
Type: IMPLANTABLE DEVICE | Site: SHOULDER | Status: FUNCTIONAL
Brand: BONE ANCHORS WITH ARTHROSCOPIC DELIVERY SYSTEM - ADVANCED

## 2021-01-19 DEVICE — IMPLANTABLE DEVICE
Type: IMPLANTABLE DEVICE | Site: SHOULDER | Status: FUNCTIONAL
Brand: BIOINDUCTIVE IMPLANT WITH ARTHROSCOPIC DELIVERY SYSTEM - MEDIUM

## 2021-01-19 DEVICE — Z INACTIVE USE 2600835 ANCHOR TEND 8 FOR REGENETEN BIOINDUCTIVE IMPL SYS: Type: IMPLANTABLE DEVICE | Site: SHOULDER | Status: FUNCTIONAL

## 2021-01-19 RX ORDER — HYDRALAZINE HYDROCHLORIDE 20 MG/ML
5 INJECTION INTRAMUSCULAR; INTRAVENOUS EVERY 10 MIN PRN
Status: DISCONTINUED | OUTPATIENT
Start: 2021-01-19 | End: 2021-01-19 | Stop reason: HOSPADM

## 2021-01-19 RX ORDER — DIPHENHYDRAMINE HYDROCHLORIDE 50 MG/ML
12.5 INJECTION INTRAMUSCULAR; INTRAVENOUS
Status: DISCONTINUED | OUTPATIENT
Start: 2021-01-19 | End: 2021-01-19 | Stop reason: HOSPADM

## 2021-01-19 RX ORDER — MIDAZOLAM HYDROCHLORIDE 1 MG/ML
INJECTION INTRAMUSCULAR; INTRAVENOUS PRN
Status: DISCONTINUED | OUTPATIENT
Start: 2021-01-19 | End: 2021-01-19 | Stop reason: SDUPTHER

## 2021-01-19 RX ORDER — HYDROMORPHONE HCL 110MG/55ML
0.5 PATIENT CONTROLLED ANALGESIA SYRINGE INTRAVENOUS EVERY 5 MIN PRN
Status: DISCONTINUED | OUTPATIENT
Start: 2021-01-19 | End: 2021-01-19 | Stop reason: HOSPADM

## 2021-01-19 RX ORDER — PROPOFOL 10 MG/ML
INJECTION, EMULSION INTRAVENOUS PRN
Status: DISCONTINUED | OUTPATIENT
Start: 2021-01-19 | End: 2021-01-19 | Stop reason: SDUPTHER

## 2021-01-19 RX ORDER — ROCURONIUM BROMIDE 10 MG/ML
INJECTION, SOLUTION INTRAVENOUS PRN
Status: DISCONTINUED | OUTPATIENT
Start: 2021-01-19 | End: 2021-01-19 | Stop reason: SDUPTHER

## 2021-01-19 RX ORDER — SODIUM CHLORIDE 0.9 % (FLUSH) 0.9 %
10 SYRINGE (ML) INJECTION EVERY 12 HOURS SCHEDULED
Status: CANCELLED | OUTPATIENT
Start: 2021-01-19

## 2021-01-19 RX ORDER — OXYCODONE HYDROCHLORIDE 5 MG/1
5 TABLET ORAL EVERY 4 HOURS PRN
Status: CANCELLED | OUTPATIENT
Start: 2021-01-19

## 2021-01-19 RX ORDER — LABETALOL HYDROCHLORIDE 5 MG/ML
5 INJECTION, SOLUTION INTRAVENOUS EVERY 10 MIN PRN
Status: DISCONTINUED | OUTPATIENT
Start: 2021-01-19 | End: 2021-01-19 | Stop reason: HOSPADM

## 2021-01-19 RX ORDER — ONDANSETRON 2 MG/ML
4 INJECTION INTRAMUSCULAR; INTRAVENOUS
Status: DISCONTINUED | OUTPATIENT
Start: 2021-01-19 | End: 2021-01-19 | Stop reason: HOSPADM

## 2021-01-19 RX ORDER — ONDANSETRON 2 MG/ML
4 INJECTION INTRAMUSCULAR; INTRAVENOUS ONCE
Status: DISCONTINUED | OUTPATIENT
Start: 2021-01-19 | End: 2021-01-19 | Stop reason: HOSPADM

## 2021-01-19 RX ORDER — FENTANYL CITRATE 50 UG/ML
INJECTION, SOLUTION INTRAMUSCULAR; INTRAVENOUS PRN
Status: DISCONTINUED | OUTPATIENT
Start: 2021-01-19 | End: 2021-01-19 | Stop reason: SDUPTHER

## 2021-01-19 RX ORDER — SODIUM CHLORIDE 0.9 % (FLUSH) 0.9 %
10 SYRINGE (ML) INJECTION EVERY 12 HOURS SCHEDULED
Status: DISCONTINUED | OUTPATIENT
Start: 2021-01-19 | End: 2021-01-19 | Stop reason: HOSPADM

## 2021-01-19 RX ORDER — SODIUM CHLORIDE, SODIUM LACTATE, POTASSIUM CHLORIDE, CALCIUM CHLORIDE 600; 310; 30; 20 MG/100ML; MG/100ML; MG/100ML; MG/100ML
INJECTION, SOLUTION INTRAVENOUS CONTINUOUS
Status: CANCELLED | OUTPATIENT
Start: 2021-01-19

## 2021-01-19 RX ORDER — PROMETHAZINE HYDROCHLORIDE 25 MG/ML
6.25 INJECTION, SOLUTION INTRAMUSCULAR; INTRAVENOUS
Status: DISCONTINUED | OUTPATIENT
Start: 2021-01-19 | End: 2021-01-19 | Stop reason: HOSPADM

## 2021-01-19 RX ORDER — FENTANYL CITRATE 50 UG/ML
50 INJECTION, SOLUTION INTRAMUSCULAR; INTRAVENOUS EVERY 5 MIN PRN
Status: DISCONTINUED | OUTPATIENT
Start: 2021-01-19 | End: 2021-01-19 | Stop reason: HOSPADM

## 2021-01-19 RX ORDER — ONDANSETRON 2 MG/ML
INJECTION INTRAMUSCULAR; INTRAVENOUS PRN
Status: DISCONTINUED | OUTPATIENT
Start: 2021-01-19 | End: 2021-01-19 | Stop reason: SDUPTHER

## 2021-01-19 RX ORDER — MEPERIDINE HYDROCHLORIDE 25 MG/ML
12.5 INJECTION INTRAMUSCULAR; INTRAVENOUS; SUBCUTANEOUS EVERY 5 MIN PRN
Status: DISCONTINUED | OUTPATIENT
Start: 2021-01-19 | End: 2021-01-19 | Stop reason: HOSPADM

## 2021-01-19 RX ORDER — 0.9 % SODIUM CHLORIDE 0.9 %
500 INTRAVENOUS SOLUTION INTRAVENOUS
Status: DISCONTINUED | OUTPATIENT
Start: 2021-01-19 | End: 2021-01-19 | Stop reason: HOSPADM

## 2021-01-19 RX ORDER — SODIUM CHLORIDE 0.9 % (FLUSH) 0.9 %
10 SYRINGE (ML) INJECTION PRN
Status: CANCELLED | OUTPATIENT
Start: 2021-01-19

## 2021-01-19 RX ORDER — LIDOCAINE HYDROCHLORIDE 20 MG/ML
INJECTION, SOLUTION INTRAVENOUS PRN
Status: DISCONTINUED | OUTPATIENT
Start: 2021-01-19 | End: 2021-01-19 | Stop reason: SDUPTHER

## 2021-01-19 RX ORDER — HYDROCODONE BITARTRATE AND ACETAMINOPHEN 5; 325 MG/1; MG/1
2 TABLET ORAL EVERY 6 HOURS PRN
Status: DISCONTINUED | OUTPATIENT
Start: 2021-01-19 | End: 2021-01-19 | Stop reason: HOSPADM

## 2021-01-19 RX ORDER — ACETAMINOPHEN 500 MG
1000 TABLET ORAL ONCE
Status: COMPLETED | OUTPATIENT
Start: 2021-01-19 | End: 2021-01-19

## 2021-01-19 RX ORDER — SODIUM CHLORIDE 0.9 % (FLUSH) 0.9 %
10 SYRINGE (ML) INJECTION PRN
Status: DISCONTINUED | OUTPATIENT
Start: 2021-01-19 | End: 2021-01-19 | Stop reason: HOSPADM

## 2021-01-19 RX ORDER — OXYCODONE HYDROCHLORIDE 5 MG/1
10 TABLET ORAL EVERY 4 HOURS PRN
Status: CANCELLED | OUTPATIENT
Start: 2021-01-19

## 2021-01-19 RX ORDER — ROPIVACAINE HYDROCHLORIDE 5 MG/ML
INJECTION, SOLUTION EPIDURAL; INFILTRATION; PERINEURAL
Status: COMPLETED | OUTPATIENT
Start: 2021-01-19 | End: 2021-01-19

## 2021-01-19 RX ORDER — SODIUM CHLORIDE, SODIUM LACTATE, POTASSIUM CHLORIDE, CALCIUM CHLORIDE 600; 310; 30; 20 MG/100ML; MG/100ML; MG/100ML; MG/100ML
INJECTION, SOLUTION INTRAVENOUS CONTINUOUS
Status: DISCONTINUED | OUTPATIENT
Start: 2021-01-19 | End: 2021-01-19 | Stop reason: HOSPADM

## 2021-01-19 RX ORDER — HYDROCODONE BITARTRATE AND ACETAMINOPHEN 5; 325 MG/1; MG/1
1 TABLET ORAL PRN
Status: DISCONTINUED | OUTPATIENT
Start: 2021-01-19 | End: 2021-01-19 | Stop reason: HOSPADM

## 2021-01-19 RX ADMIN — HYDROMORPHONE HYDROCHLORIDE 0.5 MG: 2 INJECTION INTRAMUSCULAR; INTRAVENOUS; SUBCUTANEOUS at 13:40

## 2021-01-19 RX ADMIN — CEFAZOLIN SODIUM 2 G: 10 INJECTION, POWDER, FOR SOLUTION INTRAVENOUS at 12:13

## 2021-01-19 RX ADMIN — HYDRALAZINE HYDROCHLORIDE 5 MG: 20 INJECTION INTRAMUSCULAR; INTRAVENOUS at 14:12

## 2021-01-19 RX ADMIN — FENTANYL CITRATE 100 MCG: 50 INJECTION INTRAMUSCULAR; INTRAVENOUS at 12:11

## 2021-01-19 RX ADMIN — LIDOCAINE HYDROCHLORIDE 50 MG: 20 INJECTION, SOLUTION INTRAVENOUS at 12:11

## 2021-01-19 RX ADMIN — SODIUM CHLORIDE, POTASSIUM CHLORIDE, SODIUM LACTATE AND CALCIUM CHLORIDE: 600; 310; 30; 20 INJECTION, SOLUTION INTRAVENOUS at 12:09

## 2021-01-19 RX ADMIN — SUGAMMADEX 100 MG: 100 INJECTION, SOLUTION INTRAVENOUS at 13:09

## 2021-01-19 RX ADMIN — ONDANSETRON 4 MG: 2 INJECTION INTRAMUSCULAR; INTRAVENOUS at 13:09

## 2021-01-19 RX ADMIN — MIDAZOLAM 2 MG: 1 INJECTION INTRAMUSCULAR; INTRAVENOUS at 11:00

## 2021-01-19 RX ADMIN — PROPOFOL 150 MG: 10 INJECTION, EMULSION INTRAVENOUS at 12:11

## 2021-01-19 RX ADMIN — SODIUM CHLORIDE, POTASSIUM CHLORIDE, SODIUM LACTATE AND CALCIUM CHLORIDE: 600; 310; 30; 20 INJECTION, SOLUTION INTRAVENOUS at 09:16

## 2021-01-19 RX ADMIN — ROPIVACAINE HYDROCHLORIDE 20 ML: 5 INJECTION, SOLUTION EPIDURAL; INFILTRATION; PERINEURAL at 11:05

## 2021-01-19 RX ADMIN — HYDROMORPHONE HYDROCHLORIDE 0.5 MG: 2 INJECTION INTRAMUSCULAR; INTRAVENOUS; SUBCUTANEOUS at 13:45

## 2021-01-19 RX ADMIN — HYDRALAZINE HYDROCHLORIDE 5 MG: 20 INJECTION INTRAMUSCULAR; INTRAVENOUS at 14:02

## 2021-01-19 RX ADMIN — ROCURONIUM BROMIDE 50 MG: 10 INJECTION INTRAVENOUS at 12:11

## 2021-01-19 RX ADMIN — ACETAMINOPHEN 1000 MG: 500 TABLET ORAL at 09:21

## 2021-01-19 RX ADMIN — HYDROMORPHONE HYDROCHLORIDE 0.5 MG: 2 INJECTION INTRAMUSCULAR; INTRAVENOUS; SUBCUTANEOUS at 14:05

## 2021-01-19 ASSESSMENT — PULMONARY FUNCTION TESTS
PIF_VALUE: 21
PIF_VALUE: 20
PIF_VALUE: 21
PIF_VALUE: 19
PIF_VALUE: 20
PIF_VALUE: 19
PIF_VALUE: 19
PIF_VALUE: 22
PIF_VALUE: 23
PIF_VALUE: 21
PIF_VALUE: 21
PIF_VALUE: 0
PIF_VALUE: 19
PIF_VALUE: 20
PIF_VALUE: 22
PIF_VALUE: 23
PIF_VALUE: 3
PIF_VALUE: 2
PIF_VALUE: 21
PIF_VALUE: 20
PIF_VALUE: 20
PIF_VALUE: 21
PIF_VALUE: 22
PIF_VALUE: 22
PIF_VALUE: 20
PIF_VALUE: 7
PIF_VALUE: 24
PIF_VALUE: 20
PIF_VALUE: 22
PIF_VALUE: 20
PIF_VALUE: 21
PIF_VALUE: 2
PIF_VALUE: 21

## 2021-01-19 ASSESSMENT — PAIN SCALES - GENERAL
PAINLEVEL_OUTOF10: 7
PAINLEVEL_OUTOF10: 0
PAINLEVEL_OUTOF10: 6
PAINLEVEL_OUTOF10: 5
PAINLEVEL_OUTOF10: 7

## 2021-01-19 ASSESSMENT — PAIN DESCRIPTION - DESCRIPTORS
DESCRIPTORS: ACHING;THROBBING
DESCRIPTORS: ACHING;DISCOMFORT

## 2021-01-19 ASSESSMENT — PAIN DESCRIPTION - FREQUENCY
FREQUENCY: CONTINUOUS

## 2021-01-19 ASSESSMENT — PAIN DESCRIPTION - PROGRESSION
CLINICAL_PROGRESSION: NOT CHANGED
CLINICAL_PROGRESSION: GRADUALLY IMPROVING

## 2021-01-19 ASSESSMENT — PAIN - FUNCTIONAL ASSESSMENT: PAIN_FUNCTIONAL_ASSESSMENT: 0-10

## 2021-01-19 ASSESSMENT — PAIN DESCRIPTION - LOCATION
LOCATION: SHOULDER
LOCATION: SHOULDER

## 2021-01-19 ASSESSMENT — PAIN DESCRIPTION - ORIENTATION
ORIENTATION: RIGHT
ORIENTATION: RIGHT

## 2021-01-19 ASSESSMENT — PAIN DESCRIPTION - PAIN TYPE: TYPE: SURGICAL PAIN

## 2021-01-19 NOTE — PROGRESS NOTES
1430 Pt received from PACU and report received from [facility]. Pt c/o nausea. Call light in reach. Pt given water. Fingers right hand warm mobile.

## 2021-01-19 NOTE — PROGRESS NOTES
VSS, pt c/o dizziness, stated feels like blood sugar is low, checked, blood sugar 208.   1535 Dr Olivia Spicer here to speak with pt  1540 called and spoke with Ed, arranged discharge time for           [phone] discharge instructions given to pt, voiced understanding, Dr Olivia Spicer Discharge packet with 2 scripts handed to pt.     1600 assisted pt up in room to get dressed  [phone] discharged to main entrance per wheelchair with ED.
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
## 2021-01-19 NOTE — BRIEF OP NOTE
Brief Postoperative Note      Patient: Anderson Cabello  YOB: 1970  MRN: [id number]    Date of Procedure: 1/19/2021    Pre-Op Diagnosis: CALCIFIC TENDINITIS OF RIGHT SHOULDER, IMPINGEMENT    Post-Op Diagnosis: Same       Procedure(s):  RIGHT SHOULDER ARTHROSCOPY, SUBACROMIAL DECOMPRESSION DISTAL CLAVICLE RESECTION ROTATOR CUFF REPAIR DEBRIDEMENT    Surgeon(s):  Patrica Smith DO    Assistant:  Physician Assistant: Elvis Valdivia PA-C    Anesthesia: General    Estimated Blood Loss (mL): Minimal    Complications: None    Specimens:   * No specimens in log *    Implants:  Implant Name Type Inv.  Item Serial No.  Lot No. LRB No. Used Action   IMPLANT BIOINDUCTIVE M BOV ACHILLES TEND W/ ARTHSCP DEL SYS  IMPLANT BIOINDUCTIVE M BOV ACHILLES TEND W/ ARTHSCP DEL SYS  [employer]-  Right 1 Implanted   ANCHOR TEND 8 FOR REGENETEN BIOINDUCTIVE IMPL SYS  ANCHOR TEND 8 FOR REGENETEN BIOINDUCTIVE IMPL SYS  [facility]- [id number] Right 1 Implanted   ANCHOR BONE 3 W/DEL SYS Fastener ANCHOR BONE 3 W/DEL SYS  [employer] [id number] Right 1 Implanted         Drains: * No LDAs found *    Findings: R shoulder rotator cuff tear, incomplete    Electronically signed by Debbie Wahl DO on 1/19/2021 at 1:20 PM

## 2021-01-19 NOTE — PROGRESS NOTES
1323 - transferred from OR on cart, monitor applied, alarms on and verified, bedside handoff provided by Randall Miller RN and Claudetta Poster  1340 - medicated for complaint of surgical pain/discomfort  1355 - repositioned in bed, patient tolerated well  1400 - tolerating ice chips  1402 - medicated for hypertension   1417 - phase one care complete  1424 - [facility]

## 2021-01-19 NOTE — ANESTHESIA PROCEDURE NOTES
Peripheral Block    Patient location during procedure: pre-op  Start time: 1/19/2021 11:00 AM  End time: 1/19/2021 11:05 AM  Staffing  Performed: resident/CRNA   Anesthesiologist: Liat Barrett MD  Resident/CRNA: KOREY García CRNA  Preanesthetic Checklist  Completed: patient identified, IV checked, site marked, risks and benefits discussed, surgical consent, monitors and equipment checked, pre-op evaluation, timeout performed, anesthesia consent given, oxygen available and patient being monitored  Peripheral Block  Patient position: supine  Prep: ChloraPrep  Patient monitoring: cardiac monitor, continuous pulse ox, frequent blood pressure checks and IV access  Block type: Brachial plexus  Laterality: left  Injection technique: single-shot  Guidance: ultrasound guided  Local infiltration: ropivacaine  Infiltration strength: 0.5 %  Dose: 0.5 mL  Interscalene  Provider prep: mask and sterile gloves  Local infiltration: ropivacaine  Needle  Needle type: pencil-tip   Needle gauge: 22 G  Needle length: 5 cm  Needle localization: ultrasound guidance  Assessment  Injection assessment: negative aspiration for heme, no paresthesia on injection and local visualized surrounding nerve on ultrasound  Paresthesia pain: prolonged  Slow fractionated injection: yes  Hemodynamics: stable  Medications Administered  Ropivacaine (NAROPIN) injection 0.5%, 20 mL  Reason for block: post-op pain management

## 2021-01-19 NOTE — ANESTHESIA POSTPROCEDURE EVALUATION
Department of Anesthesiology  Postprocedure Note    Patient: Sherin Barrett  MRN: [id number]  YOB: 1970  Date of evaluation: 1/19/2021  Time:  2:28 PM     Procedure Summary     Date: 01/19/21 Room / Location: [address] [place] [address]    Anesthesia Start: 1209 Anesthesia Stop: 1329    Procedure: RIGHT SHOULDER ARTHROSCOPY, SUBACROMIAL DECOMPRESSION DISTAL CLAVICLE RESECTION ROTATOR CUFF REPAIR DEBRIDEMENT (Right ) Diagnosis: (CALCIFIC TENDINITIS OF RIGHT SHOULDER, IMPINGEMENT)    Surgeons: Connor Sainz DO Responsible Provider: Juana Abbott MD    Anesthesia Type: general, regional ASA Status: 2          Anesthesia Type: general, regional    Antoinette Phase I: Antoinette Score: 9    Antoinette Phase II:      Last vitals: Reviewed and per EMR flowsheets.        Anesthesia Post Evaluation    Patient location during evaluation: PACU  Patient participation: complete - patient participated  Level of consciousness: awake  Pain score: 3  Airway patency: patent  Nausea & Vomiting: no vomiting and no nausea  Complications: no  Cardiovascular status: blood pressure returned to baseline and hemodynamically stable  Respiratory status: acceptable  Hydration status: stable
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
## 2021-01-21 ENCOUNTER — HOSPITAL ENCOUNTER (OUTPATIENT)
Dept: PHYSICAL THERAPY | Age: 51
Discharge: HOME | End: 2021-01-21

## 2021-02-01 ENCOUNTER — OFFICE VISIT (OUTPATIENT)
Dept: ORTHOPEDIC SURGERY | Age: 51
End: 2021-02-01

## 2021-02-01 VITALS — RESPIRATION RATE: 16 BRPM | BODY MASS INDEX: 35.34 KG/M2 | WEIGHT: 207 LBS | HEIGHT: 64 IN

## 2021-02-01 DIAGNOSIS — Z98.890 S/P ARTHROSCOPY OF RIGHT SHOULDER: Primary | ICD-10-CM

## 2021-02-01 PROCEDURE — 99024 POSTOP FOLLOW-UP VISIT: CPT | Performed by: ORTHOPAEDIC SURGERY

## 2021-02-01 ASSESSMENT — ENCOUNTER SYMPTOMS
SHORTNESS OF BREATH: 0
COLOR CHANGE: 0

## 2021-02-01 NOTE — PROGRESS NOTES
Patient returns to the office 2 weeks post operatively following a right shoulder arthroscopy that was performed on 1/19/21. Patient denies SOB, chest, or calf muscle pain with sutures dry and clean with no visible signs of infection. Pain is rated at a 6/10 with pain varying from numbness to severe pain with warmth within the bicep radiating towards the rotator cuff. She continues to take Ibuprofen 800 mg and muscle relaxer with no relief in addition to alternation of heat and ice at night.

## 2021-02-01 NOTE — PROGRESS NOTES
Subjective:      Patient ID: Coty Hollis is a 48 y.o. female. Patient returns to the office 2 weeks post operatively following a right shoulder arthroscopy that was performed on 1/19/21. Patient denies SOB, chest, or calf muscle pain with sutures dry and clean with no visible signs of infection. Pain is rated at a 6/10 with pain varying from numbness to severe pain with warmth within the bicep radiating towards the rotator cuff. She continues to take Ibuprofen 800 mg and muscle relaxer with no relief in addition to alternation of heat and ice at night. She comes in today for her 2-week postop recheck after right shoulder arthroscopy with rotator cuff repair with Regeneten. Overall she states that she feels like the pain she was having before surgery is already getting better. She does continue to have tightness globally in the right shoulder. She has been working on range of motion exercises at home. Patient denies any new injury to the involved extremity/ joint, denies numbness or tingling in the involved extremity and denies fever or chills. Review of Systems   Constitutional: Negative for activity change, chills and fever. Respiratory: Negative for shortness of breath. Cardiovascular: Negative for chest pain. Musculoskeletal: Positive for arthralgias and myalgias. Negative for gait problem and joint swelling. Skin: Negative for color change, pallor, rash and wound. Neurological: Positive for weakness. Negative for numbness.        Past Medical History:   Diagnosis Date    Arthritis     Neck    Asthma     PCP - last episode: 2013    Diabetes mellitus ([facility] [address]     Type II - follows with PCP    Ehler's-Danlos syndrome Dx: 2000    PCP    Fibromyalgia     Hx of blood clots 2000    Left lower leg - unsure of treatment    Hypertension     Middle cerebral aneurysm 2002    surgery @ [facility]    Mitral valve regurgitation     Originally saw Dr. Prudencio Shah, now follows with PCP (updated Sam 97, DO

## 2021-02-04 ENCOUNTER — TELEPHONE (OUTPATIENT)
Dept: ORTHOPEDIC SURGERY | Age: 51
End: 2021-02-04

## 2021-02-08 DIAGNOSIS — Z98.890 S/P ARTHROSCOPY OF RIGHT SHOULDER: Primary | ICD-10-CM

## 2021-02-08 RX ORDER — OXYCODONE HYDROCHLORIDE AND ACETAMINOPHEN 5; 325 MG/1; MG/1
1 TABLET ORAL EVERY 6 HOURS PRN
Qty: 25 TABLET | Refills: 0 | Status: SHIPPED | OUTPATIENT
Start: 2021-02-08 | End: 2021-02-15

## 2021-02-10 ENCOUNTER — OFFICE VISIT (OUTPATIENT)
Dept: ORTHOPEDIC SURGERY | Age: 51
End: 2021-02-10

## 2021-02-10 VITALS — HEART RATE: 84 BPM | WEIGHT: 200 LBS | HEIGHT: 64 IN | BODY MASS INDEX: 34.15 KG/M2 | OXYGEN SATURATION: 98 %

## 2021-02-10 DIAGNOSIS — Z98.890 S/P ARTHROSCOPY OF RIGHT SHOULDER: Primary | ICD-10-CM

## 2021-02-10 PROCEDURE — 99024 POSTOP FOLLOW-UP VISIT: CPT | Performed by: PHYSICIAN ASSISTANT

## 2021-02-10 RX ORDER — PREDNISONE 20 MG/1
20 TABLET ORAL 2 TIMES DAILY
Qty: 14 TABLET | Refills: 0 | Status: SHIPPED | OUTPATIENT
Start: 2021-02-10 | End: 2021-02-17

## 2021-02-11 NOTE — PROGRESS NOTES
Outpatient Physical Therapy        [] Phone: [phone]   Fax: [phone]  Physician:   Trung Sun, [facility] , Dr. Estrella Alvarez      From: Wilver Betancourt, PT     Patient: Jay Antunez                    : 1970    Diagnosis: S13. [facility], [id number], [id number], S23.3xx1, [id number]. Treatment Diagnosis: neck, upper back, B shoulder pain, low back pain. Date: 2021    []  Progress Note                [x]  Discharge Note    Total Visits to date:  15     Cancels/No-shows to date:   3    Pt has not returned to PT. Refer to note 21 for last known status. Subjective:     Going to have surgery 21 for R. Ws really sore after the last appt. C/o pain and pain with shoulder movement, tightness in neck and upper back. Stressed about up coming surgery, what she will do about work after surgery. Still doesn't have her vehicle back yet.         Prominent Objective Findings:     ER:  L 65 , R 35     IR L 45, R:  35  CROM:  Flexion 42 , [id number], Rotation  R:50  L:   64  Shoulder Flexion: L AROM:110 , R supine AROM: 100         Tenderness at post scap mms, upper trap, pectorals. Cervical paraspinals. Pain with impingement signs. Pain limited strength in all directions at shoulder. Limited tolerance to ROM today, pain and guarding with all motions of shoulder. Goal Status:  [] Achieved [] Partially Achieved  [x] Not Achieved          Planned Services:  [x] Therapeutic Exercise    [] Modalities:  [x] Therapeutic Activity     [] Ultrasound  [] Electric Stimulation  [] Gait Training      [] Cervical Traction    [] Lumbar Traction  [x] Neuromuscular Re-education  [] Cold/hotpack [] Iontophoresis  [x] Instruction in HEP      Other:  [x] Manual Therapy       []  Vasopneumatic  [] Self care management                           [] Dry needling trigger point/pain management                ?           Frequency/Duration:  # Days per week: [] 1 day # Weeks: [] 1 week [] 4 weeks      [x] 2 days?   [] 2 weeks [x] 5 weeks      [] 3 days   [] 3 weeks [] 6 weeks     Rehab Potential: [] Excellent [x] Good [] Fair  [] Poor     Patient Status: [] Continue per initial Plan of Care     [x] Patient now discharged     [] Additional visits requested, Please re-certify for additional visits:          Electronically signed by:  Nelly Quintero, PT, 2/11/2021, 11:01 AM    If you have any questions or concerns, please don't hesitate to call.   Thank you for your referral.

## 2021-02-24 ENCOUNTER — HOSPITAL ENCOUNTER (OUTPATIENT)
Dept: PHYSICAL THERAPY | Age: 51
Setting detail: THERAPIES SERIES
Discharge: HOME | End: 2021-02-24
Payer: COMMERCIAL

## 2021-02-24 PROCEDURE — 97110 THERAPEUTIC EXERCISES: CPT

## 2021-02-24 PROCEDURE — 97161 PT EVAL LOW COMPLEX 20 MIN: CPT

## 2021-02-24 PROCEDURE — 97016 VASOPNEUMATIC DEVICE THERAPY: CPT

## 2021-02-24 ASSESSMENT — PAIN SCALES - GENERAL: PAINLEVEL_OUTOF10: 6

## 2021-02-24 ASSESSMENT — PAIN DESCRIPTION - FREQUENCY: FREQUENCY: CONTINUOUS
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
## 2021-03-01 ENCOUNTER — HOSPITAL ENCOUNTER (OUTPATIENT)
Dept: PHYSICAL THERAPY | Age: 51
Setting detail: THERAPIES SERIES
Discharge: HOME | End: 2021-03-01
Payer: COMMERCIAL

## 2021-03-01 ENCOUNTER — OFFICE VISIT (OUTPATIENT)
Dept: ORTHOPEDIC SURGERY | Age: 51
End: 2021-03-01

## 2021-03-01 VITALS
HEIGHT: 64 IN | WEIGHT: 207 LBS | HEART RATE: 96 BPM | BODY MASS INDEX: 35.34 KG/M2 | RESPIRATION RATE: 16 BRPM | OXYGEN SATURATION: 99 %

## 2021-03-01 DIAGNOSIS — Z98.890 S/P ARTHROSCOPY OF RIGHT SHOULDER: Primary | ICD-10-CM

## 2021-03-01 PROCEDURE — 97110 THERAPEUTIC EXERCISES: CPT

## 2021-03-01 PROCEDURE — 99024 POSTOP FOLLOW-UP VISIT: CPT | Performed by: ORTHOPAEDIC SURGERY

## 2021-03-01 PROCEDURE — 97140 MANUAL THERAPY 1/> REGIONS: CPT

## 2021-03-01 PROCEDURE — 97016 VASOPNEUMATIC DEVICE THERAPY: CPT

## 2021-03-01 NOTE — PATIENT INSTRUCTIONS
Continue working on ROM and strengthening exercises per PT protocol  Continue to take Ibuprofen and Tizanidine as needed  Rest, ice, and elevate as needed  Weightbearing and activities as tolerated  Follow up in 6 weeks

## 2021-03-01 NOTE — PROGRESS NOTES
Patient returns to the office for a post operative follow up appointment on a right shoulder arthroscopy that was performed on 1/19/21with continued anterior and anterior lateral shoulder pain that is worsened by general movement. Pain is rated at a 7/10 with only one session of PT completed which patient states increased her pain. She continues to take Ibuprofen and Tizanidine with little to no relief for her symptoms. No new injury reported to the shoulder.
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
## 2021-03-02 ASSESSMENT — ENCOUNTER SYMPTOMS
SHORTNESS OF BREATH: 0
COLOR CHANGE: 0

## 2021-03-02 NOTE — PROGRESS NOTES
Subjective:      Patient ID: Shai Schneider is a 46 y.o. female. Patient returns to the office for a post operative follow up appointment on a right shoulder arthroscopy that was performed on 1/19/21with continued anterior and anterior lateral shoulder pain that is worsened by general movement. Pain is rated at a 7/10 with only one session of PT completed which patient states increased her pain. She continues to take Ibuprofen and Tizanidine with little to no relief for her symptoms. No new injury reported to the shoulder. She comes in today for her 6-week postop recheck after right shoulder arthroscopy with rotator cuff repair with Regeneten. She states that overall she is continue physical therapy and the pain and stiffness in her right shoulder is improving. She does continue to have tightness globally in the right shoulder. She has been working on range of motion exercises at home. Patient denies any new injury to the involved extremity/ joint, denies numbness or tingling in the involved extremity and denies fever or chills. Shoulder Pain   Pertinent negatives include no fever or numbness. Review of Systems   Constitutional: Negative for activity change, chills and fever. Respiratory: Negative for shortness of breath. Cardiovascular: Negative for chest pain. Musculoskeletal: Positive for arthralgias and myalgias. Negative for gait problem and joint swelling. Skin: Negative for color change, pallor, rash and wound. Neurological: Positive for weakness. Negative for numbness.        Past Medical History:   Diagnosis Date    Arthritis     Neck    Asthma     PCP - last episode: 2013    Diabetes mellitus ([facility] [address]     Type II - follows with PCP    Ehler's-Danlos syndrome Dx: 2000    PCP    Fibromyalgia     Hx of blood clots 2000    Left lower leg - unsure of treatment    Hypertension     Middle cerebral aneurysm 2002    surgery @ [place]    Mitral valve regurgitation Originally saw Dr. Jakob Campo, now follows with PCP (updated 1/13/2021)    Raynauds syndrome     Short-term memory loss 2002    due to aneurysm per pt       Objective:   Physical Exam  Constitutional:       Appearance: She is well-developed. HENT:      Head: Normocephalic and atraumatic. Eyes:      Pupils: Pupils are equal, round, and reactive to light. Neck:      Musculoskeletal: Normal range of motion. Pulmonary:      Effort: Pulmonary effort is normal.   Musculoskeletal: Normal range of motion. General: Tenderness present. No deformity. Right shoulder: She exhibits tenderness, pain and decreased strength. She exhibits normal range of motion, no bony tenderness, no swelling, no effusion, no crepitus, no deformity, no laceration, no spasm and normal pulse. Left shoulder: She exhibits normal range of motion, no tenderness, no bony tenderness, no swelling, no effusion, no crepitus, no deformity, no laceration, no pain, no spasm, normal pulse and normal strength. Right elbow: Normal.     Left elbow: Normal.   Skin:     General: Skin is warm and dry. Coloration: Skin is not pale. Findings: No erythema or rash. Neurological:      Mental Status: She is alert and oriented to person, place, and time. Sensory: No sensory deficit. Right shoulder-Incision clean, dry, intact, with no erythema, no drainage, and no signs of infection. Flexion 170  Abduction 150  External rotation 80  Internal rotation 75  Negative Nguyen sign. Strength 4/5 to resisted abduction. Assessment:      Right shoulder rotator cuff repair with Regeneten, 6 weeks      Plan:         I discussed with her today that she is continuing to progress extremely well. Continue working on active and passive range of motion exercises. At this point she can resume lifting, pushing, pulling as tolerated with the right arm. Continue formal physical therapy.   No lifting, pushing, pulling with affected

## 2021-03-03 ENCOUNTER — HOSPITAL ENCOUNTER (OUTPATIENT)
Dept: PHYSICAL THERAPY | Age: 51
Discharge: HOME | End: 2021-03-03

## 2021-03-04 ENCOUNTER — HOSPITAL ENCOUNTER (OUTPATIENT)
Dept: PHYSICAL THERAPY | Age: 51
Setting detail: THERAPIES SERIES
Discharge: HOME | End: 2021-03-04
Payer: COMMERCIAL

## 2021-03-04 PROCEDURE — 97110 THERAPEUTIC EXERCISES: CPT

## 2021-03-04 PROCEDURE — 97140 MANUAL THERAPY 1/> REGIONS: CPT
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
## 2021-03-08 ENCOUNTER — HOSPITAL ENCOUNTER (OUTPATIENT)
Dept: PHYSICAL THERAPY | Age: 51
Setting detail: THERAPIES SERIES
Discharge: HOME | End: 2021-03-08
Payer: COMMERCIAL

## 2021-03-08 PROCEDURE — 97110 THERAPEUTIC EXERCISES: CPT

## 2021-03-08 PROCEDURE — 97016 VASOPNEUMATIC DEVICE THERAPY: CPT

## 2021-03-08 PROCEDURE — 97140 MANUAL THERAPY 1/> REGIONS: CPT
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
## 2021-03-10 ENCOUNTER — HOSPITAL ENCOUNTER (OUTPATIENT)
Dept: PHYSICAL THERAPY | Age: 51
Setting detail: THERAPIES SERIES
Discharge: HOME | End: 2021-03-10
Payer: COMMERCIAL

## 2021-03-10 PROCEDURE — 97140 MANUAL THERAPY 1/> REGIONS: CPT

## 2021-03-10 PROCEDURE — 97110 THERAPEUTIC EXERCISES: CPT

## 2021-03-10 PROCEDURE — 97016 VASOPNEUMATIC DEVICE THERAPY: CPT
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
## 2021-03-11 ENCOUNTER — HOSPITAL ENCOUNTER (OUTPATIENT)
Dept: PHYSICAL THERAPY | Age: 51
Setting detail: THERAPIES SERIES
Discharge: HOME | End: 2021-03-11
Payer: COMMERCIAL
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
## 2021-03-15 ENCOUNTER — HOSPITAL ENCOUNTER (OUTPATIENT)
Dept: PHYSICAL THERAPY | Age: 51
Setting detail: THERAPIES SERIES
Discharge: HOME | End: 2021-03-15
Payer: COMMERCIAL

## 2021-03-15 PROCEDURE — 97140 MANUAL THERAPY 1/> REGIONS: CPT

## 2021-03-15 PROCEDURE — 97016 VASOPNEUMATIC DEVICE THERAPY: CPT

## 2021-03-15 PROCEDURE — 97110 THERAPEUTIC EXERCISES: CPT
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
## 2021-03-17 ENCOUNTER — HOSPITAL ENCOUNTER (OUTPATIENT)
Dept: PHYSICAL THERAPY | Age: 51
Setting detail: THERAPIES SERIES
Discharge: HOME | End: 2021-03-17
Payer: COMMERCIAL

## 2021-03-17 PROCEDURE — 97110 THERAPEUTIC EXERCISES: CPT

## 2021-03-17 NOTE — FLOWSHEET NOTE
Exercise  [] Modalities:  [] Therapeutic Activity     [] Ultrasound  [] Estim  [] Gait Training      [] Cervical Traction [] Lumbar Traction  [] Neuromuscular Re-education    [] Cold/hotpack [] Iontophoresis   [x] Instruction in HEP      [] Vasopneumatic   [] Dry Needling    [x] Manual Therapy               [] Aquatic Therapy              Electronically signed by:  Rajesh Maradiaga 3/17/2021, 8:48 AM

## 2021-03-18 ENCOUNTER — HOSPITAL ENCOUNTER (OUTPATIENT)
Dept: PHYSICAL THERAPY | Age: 51
Setting detail: THERAPIES SERIES
Discharge: HOME | End: 2021-03-18
Payer: COMMERCIAL

## 2021-03-18 PROCEDURE — 97110 THERAPEUTIC EXERCISES: CPT

## 2021-03-18 PROCEDURE — 97140 MANUAL THERAPY 1/> REGIONS: CPT

## 2021-03-18 PROCEDURE — 97016 VASOPNEUMATIC DEVICE THERAPY: CPT
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
## 2021-03-18 NOTE — PROGRESS NOTES
Outpatient Physical Therapy        [] Phone: [phone]   Fax: [phone]  Physician:   Dr. Arnulfo Grider       From: [facility]     Patient: Víctor Bacon                    : 1970    Diagnosis: R shoulder arthroscopy    Treatment Diagnosis: R shoulder rotator cuff repair    Date: 3/18/2021    [x]  Progress Note                []  Discharge Note    Total Visits to date:  10     Cancels/No-shows to date:  1     Subjective:  Shoulder is still very painful. Pain with motions. Pain today 8-9/10. Typically 6/10      Prominent Objective Findings:     Post stretch: 75 Er, 50IR. 130 flexion   Seated:  130 flexion     MMT:    mind range 4/5 flexion, ER, IR all with pain. Tenderness at upper trap, biceps groove, rhomboids,     Symptoms easily exacerbated with minimal activities. Limited tolerance to ROM and strengthening activities. Goal Status:  [] Achieved [x] Partially Achieved  [] Not Achieved             Planned Services:  [x] Therapeutic Exercise    [] Modalities:  [x] Therapeutic Activity     [] Ultrasound  [] Electric Stimulation  [] Gait Training      [] Cervical Traction    [] Lumbar Traction  [x] Neuromuscular Re-education  [] Cold/hotpack [] Iontophoresis  [x] Instruction in HEP      Other:  [x] Manual Therapy       []  Vasopneumatic  [] Self care management                           [] Dry needling trigger point/pain management                ? Frequency/Duration:  # Days per week: [] 1 day # Weeks: [] 1 week [x] 4 weeks      [x] 2 days?    [] 2 weeks [] 5 weeks      [] 3 days   [] 3 weeks [] 6 weeks     Rehab Potential: [] Excellent [x] Good [] Fair  [] Poor     Patient Status: [x] Continue per initial Plan of Care     [] Patient now discharged     [x] Additional visits requested, Please re-certify for additional visits:        Electronically signed by:  [facility], 3/18/2021, 7:32 AM    If you have any questions or concerns, please don't hesitate to call.  Thank you for your referral.    Physician Signature:______________________ Date:______ Time: ________  By signing above, therapists plan is approved by physician

## 2021-03-25 ENCOUNTER — HOSPITAL ENCOUNTER (OUTPATIENT)
Dept: PHYSICAL THERAPY | Age: 51
Setting detail: THERAPIES SERIES
Discharge: HOME | End: 2021-03-25
Payer: COMMERCIAL

## 2021-03-25 PROCEDURE — 97110 THERAPEUTIC EXERCISES: CPT

## 2021-03-25 PROCEDURE — 97016 VASOPNEUMATIC DEVICE THERAPY: CPT

## 2021-03-25 PROCEDURE — 97140 MANUAL THERAPY 1/> REGIONS: CPT
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
## 2021-03-29 ENCOUNTER — HOSPITAL ENCOUNTER (OUTPATIENT)
Dept: PHYSICAL THERAPY | Age: 51
Discharge: HOME | End: 2021-03-29

## 2021-04-01 ENCOUNTER — HOSPITAL ENCOUNTER (OUTPATIENT)
Dept: PHYSICAL THERAPY | Age: 51
Setting detail: THERAPIES SERIES
Discharge: HOME | End: 2021-04-01
Payer: COMMERCIAL

## 2021-04-01 PROCEDURE — 97140 MANUAL THERAPY 1/> REGIONS: CPT

## 2021-04-01 PROCEDURE — 97110 THERAPEUTIC EXERCISES: CPT
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
## 2021-04-05 ENCOUNTER — HOSPITAL ENCOUNTER (OUTPATIENT)
Dept: PHYSICAL THERAPY | Age: 51
Setting detail: THERAPIES SERIES
Discharge: HOME | End: 2021-04-05
Payer: COMMERCIAL

## 2021-04-05 PROCEDURE — 97140 MANUAL THERAPY 1/> REGIONS: CPT

## 2021-04-05 PROCEDURE — 97110 THERAPEUTIC EXERCISES: CPT

## 2021-04-05 NOTE — FLOWSHEET NOTE
with taking temperature via forehead thermometer. Patient presented with all negative signs and symptoms and had no fever >100 degrees Fahrenheit this date. Supine:  Er, 50IR. flexion 140  Seated:  125 flexion         Exercises: (No more than 4 columns)   Exercise/Equipment 3/17/21 #8 3/18/21 #9 3/25/21 #10 4/1/21 #11 4/5/21 #12             WARM UP           Pulleys                TABLE        AA/PROM Focus on ER stretch and flexion stretch. Flexion, ER,IR,  Flexion, ER,IR,  Flexion, scaption ER,IR,  Flexion, scaption ER,IR,    Punches  3x10      ER w towel roll Yellow 3x10  1# w towel roll     Prone Habd, scaption   0# 3x10 each                STANDING        isometrics        Wall slides w core wheel 3x10    TRX flexion   Rows Green 3x10  Green 3x10  Green 4x10   Flex, scaption        Extension Green 3x10  Green 3x10     curls     4# 3x10                PROPRIOCEPTION        Rhythmic stabilization supine        Ball on wall                                MODALITIES        Vaso -- x10 x10 decline                Other Therapeutic Activities/Education:        Home Exercise Program:  Rows, AROM, ER. Manual Treatments:  TPR to upper trap, levator, Rhomboid, pectorals gentle oscillation for pain control and relaxation for ROM. Manual pec stretching    Modalities:  Patient received vasocompression on her right shld for pain and inflammation for 10 min on low pressure. Patient had negative skin reaction afterwards. Communication with other providers:        Assessment:  (Response towards treatment session) (Pain Rating)  Improved tolerance to activities today relative to last visit. Pain limits all activity. Still having shooting pains at random times. To discuss dropping objects with doctor at followup. Rates pain 7/10 following session. Continue with therapy progressing as tolerated.             Plan for Next Session:        Time In / Time Out:  4983/6736    Timed Code/Total Treatment Minutes:  45/45    Next Progress Note due:        Plan of Care Interventions:  [x] Therapeutic Exercise  [] Modalities:  [] Therapeutic Activity     [] Ultrasound  [] Estim  [] Gait Training      [] Cervical Traction [] Lumbar Traction  [] Neuromuscular Re-education    [] Cold/hotpack [] Iontophoresis   [x] Instruction in HEP      [] Vasopneumatic   [] Dry Needling    [x] Manual Therapy               [] Aquatic Therapy              Electronically signed by:  Mustapha Jurado 4/5/2021, 8:05 AM

## 2021-04-07 ENCOUNTER — HOSPITAL ENCOUNTER (OUTPATIENT)
Dept: PHYSICAL THERAPY | Age: 51
Setting detail: THERAPIES SERIES
Discharge: HOME | End: 2021-04-07
Payer: COMMERCIAL

## 2021-04-07 PROCEDURE — 97110 THERAPEUTIC EXERCISES: CPT

## 2021-04-07 PROCEDURE — 97140 MANUAL THERAPY 1/> REGIONS: CPT

## 2021-04-07 NOTE — FLOWSHEET NOTE
Patient presented with all negative signs and symptoms and had no fever >100 degrees Fahrenheit this date. Supine:  Er, 50IR. flexion   Seated:   flexion      Increased guarding. Tenderness at upper trap, rhomboids, supra, infra, pectorals. Exercises: (No more than 4 columns)   Exercise/Equipment 3/17/21 #8 3/18/21 #9 3/25/21 #10 4/1/21 #11 4/5/21 #12 4/7/21              WARM UP            Pulleys                  TABLE         AA/PROM Focus on ER stretch and flexion stretch. Flexion, ER,IR,  Flexion, ER,IR,  Flexion, scaption ER,IR,  Flexion, scaption ER,IR,  Flexion, scaption ER,IR,    Punches  3x10       ER w towel roll Yellow 3x10  1# w towel roll      Prone Habd, scaption   0# 3x10 each                  STANDING         isometrics         Wall slides w core wheel 3x10    TRX flexion    Rows Green 3x10  Green 3x10  Green 4x10    Flex, scaption         Extension Green 3x10  Green 3x10      curls     4# 3x10                  PROPRIOCEPTION         Rhythmic stabilization supine         Ball on wall                                    MODALITIES         Vaso -- x10 x10 decline  decline                Other Therapeutic Activities/Education:        Home Exercise Program:  Rows, AROM, ER. Manual Treatments:  TPR to upper trap, levator, Rhomboid, pectorals gentle oscillation for pain control and relaxation for ROM. Manual upper trap, scalene stretches. Modalities:  Patient received vasocompression on her right shld for pain and inflammation for 10 min on low pressure. Patient had negative skin reaction afterwards. Communication with other providers:        Assessment:  (Response towards treatment session) (Pain Rating)  Decreased activity and increased pain since last visit. Pt has very limited tolerance to even PROM today. Still having shooting pains at random times. Rates pain 9-10/10 following session. Continue with therapy progressing as tolerated.             Plan for Next Session:        Time In / Time Out:  3299/7674    Timed Code/Total Treatment Minutes:  45/45    Next Progress Note due:        Plan of Care Interventions:  [x] Therapeutic Exercise  [] Modalities:  [] Therapeutic Activity     [] Ultrasound  [] Estim  [] Gait Training      [] Cervical Traction [] Lumbar Traction  [] Neuromuscular Re-education    [] Cold/hotpack [] Iontophoresis   [x] Instruction in HEP      [] Vasopneumatic   [] Dry Needling    [x] Manual Therapy               [] Aquatic Therapy              Electronically signed by:  Janie Mitchell 4/7/2021, 8:48 AM

## 2021-04-14 ENCOUNTER — TELEPHONE (OUTPATIENT)
Dept: ORTHOPEDIC SURGERY | Age: 51
End: 2021-04-14

## 2021-04-14 DIAGNOSIS — Z98.890 S/P ARTHROSCOPY OF RIGHT SHOULDER: Primary | ICD-10-CM

## 2021-04-14 NOTE — TELEPHONE ENCOUNTER
Pt had r shoulder surgery In [facility], had to change appmt from today to 5/5 (first opening).  She is having a lot of pain and would like to continue PT.    [phone]  please advise

## 2021-04-22 ENCOUNTER — HOSPITAL ENCOUNTER (OUTPATIENT)
Dept: PHYSICAL THERAPY | Age: 51
Setting detail: THERAPIES SERIES
Discharge: HOME | End: 2021-04-22
Payer: COMMERCIAL

## 2021-04-22 PROCEDURE — 97140 MANUAL THERAPY 1/> REGIONS: CPT

## 2021-04-22 PROCEDURE — 97110 THERAPEUTIC EXERCISES: CPT

## 2021-04-22 NOTE — FLOWSHEET NOTE
answering questions related to feeling ill, cough, or SOB, along with taking temperature via forehead thermometer. Patient presented with all negative signs and symptoms and had no fever >100 degrees Fahrenheit this date. Supine:  Er, 50IR. flexion   Seated:   120 flexion with catching pains. Exercises: (No more than 4 columns)   Exercise/Equipment 4/1/21 #11 4/5/21 #12 4/7/21 4/22/21            WARM UP          Pulleys              TABLE       AA/PROM Flexion, scaption ER,IR,  Flexion, scaption ER,IR,  Flexion, scaption ER,IR,  Flexion, scaption ER,IR,    Punches    3x10   ER w towel roll    Supine loose packed MRE   Prone Habd, scaption                 STANDING       isometrics       Wall slides  TRX flexion     Rows  Green 4x10  scap retraction. Flex, scaption       Extension       curls  4# 3x10                 PROPRIOCEPTION       Rhythmic stabilization supine    Supine, 90 flexion,  And IR/ER at loose packed. Ball on wall                            MODALITIES       Vaso decline  decline XX              Other Therapeutic Activities/Education:        Home Exercise Program:  Rows, AROM, ER. Manual Treatments:  TPR to upper trap, levator, Rhomboid, pectorals. Manual cervical distraction  Manual upper trap, scalene stretches. Modalities:  Patient received vasocompression on her right shld for pain and inflammation for 10 min on low pressure. Patient had negative skin reaction afterwards. Communication with other providers:        Assessment:  (Response towards treatment session) (Pain Rating)  Continued complaints of increased pain. Pt has very limited tolerance activities today. Still having shooting pains at random times. Rates pain 7/10 following session. Continue with therapy progressing as tolerated.             Plan for Next Session:        Time In / Time Out:  2926/6461    Timed Code/Total Treatment Minutes:  44/44    Next Progress Note due:        Plan of Care Interventions:  [x] Therapeutic Exercise  [] Modalities:  [] Therapeutic Activity     [] Ultrasound  [] Estim  [] Gait Training      [] Cervical Traction [] Lumbar Traction  [] Neuromuscular Re-education    [] Cold/hotpack [] Iontophoresis   [x] Instruction in HEP      [] Vasopneumatic   [] Dry Needling    [x] Manual Therapy               [] Aquatic Therapy              Electronically signed by:  Aislinn Young 4/22/2021, 7:58 AM

## 2021-04-26 ENCOUNTER — HOSPITAL ENCOUNTER (OUTPATIENT)
Dept: PHYSICAL THERAPY | Age: 51
Setting detail: THERAPIES SERIES
Discharge: HOME | End: 2021-04-26
Payer: COMMERCIAL

## 2021-04-26 PROCEDURE — 97110 THERAPEUTIC EXERCISES: CPT

## 2021-04-26 PROCEDURE — 97016 VASOPNEUMATIC DEVICE THERAPY: CPT

## 2021-04-26 PROCEDURE — 97140 MANUAL THERAPY 1/> REGIONS: CPT
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
## 2021-05-05 ENCOUNTER — OFFICE VISIT (OUTPATIENT)
Dept: ORTHOPEDIC SURGERY | Age: 51
End: 2021-05-05
Payer: COMMERCIAL

## 2021-05-05 VITALS
WEIGHT: 205 LBS | RESPIRATION RATE: 16 BRPM | HEART RATE: 86 BPM | HEIGHT: 64 IN | BODY MASS INDEX: 35 KG/M2 | OXYGEN SATURATION: 98 %

## 2021-05-05 DIAGNOSIS — Z98.890 S/P ARTHROSCOPY OF RIGHT SHOULDER: Primary | ICD-10-CM

## 2021-05-05 DIAGNOSIS — Z09 POSTOP CHECK: ICD-10-CM

## 2021-05-05 PROCEDURE — G8417 CALC BMI ABV UP PARAM F/U: HCPCS | Performed by: ORTHOPAEDIC SURGERY

## 2021-05-05 PROCEDURE — 99212 OFFICE O/P EST SF 10 MIN: CPT | Performed by: ORTHOPAEDIC SURGERY

## 2021-05-05 PROCEDURE — G8427 DOCREV CUR MEDS BY ELIG CLIN: HCPCS | Performed by: ORTHOPAEDIC SURGERY

## 2021-05-05 PROCEDURE — 1036F TOBACCO NON-USER: CPT | Performed by: ORTHOPAEDIC SURGERY

## 2021-05-05 PROCEDURE — 3017F COLORECTAL CA SCREEN DOC REV: CPT | Performed by: ORTHOPAEDIC SURGERY

## 2021-05-05 RX ORDER — DULAGLUTIDE 1.5 MG/.5ML
INJECTION, SOLUTION SUBCUTANEOUS
COMMUNITY
Start: 2021-04-28

## 2021-05-05 RX ORDER — CELECOXIB 200 MG/1
CAPSULE ORAL
COMMUNITY
Start: 2021-04-23 | End: 2021-09-15

## 2021-05-05 RX ORDER — METHOCARBAMOL 750 MG/1
TABLET, FILM COATED ORAL
COMMUNITY
Start: 2021-04-23 | End: 2022-04-05 | Stop reason: SDUPTHER

## 2021-05-05 ASSESSMENT — ENCOUNTER SYMPTOMS
SHORTNESS OF BREATH: 0
COLOR CHANGE: 0

## 2021-05-05 NOTE — PROGRESS NOTES
Subjective:      Patient ID: Leisa Jarvis is a 46 y.o. female. Patient returns to the office for a follow up appointment on her right shoulder arthroscopy that was performed on 1/19/21. She continues to have anterior and anterior lateral shoulder pain which varies from a dull, aching to sharp pain upon movement associated with muscle spasms and continued stiffness which is affecting her ADLs. She is now being prescribed Celexa and Methocarbamol by her PCP, Dr. Jane Marinelli which is providing her little relief for her symptoms. Pain is rated at a 7/10 with no improvement of her symptoms by the continued therapy sessions. No new injury reported at this time. She comes in today and is now about 4 months out from her right shoulder arthroscopy with rotator cuff repair with Kate. She states that she was doing well however beginning in the middle of April she began having worsening pain in the right shoulder. She describes the pain as a sharp, stabbing pain along the front and side of her right shoulder which is worse with overhead activities. She has continued physical therapy which has not been helping with her symptoms. Patient denies any new injury to the involved extremity/ joint, denies numbness or tingling in the involved extremity and denies fever or chills. Shoulder Pain   Pertinent negatives include no fever or numbness. Review of Systems   Constitutional: Negative for activity change, chills and fever. Respiratory: Negative for shortness of breath. Cardiovascular: Negative for chest pain. Musculoskeletal: Positive for arthralgias and myalgias. Negative for gait problem and joint swelling. Skin: Negative for color change, pallor, rash and wound. Neurological: Positive for weakness. Negative for numbness.        Past Medical History:   Diagnosis Date    Arthritis     Neck    Asthma     PCP - last episode: 2013    Diabetes mellitus ([facility] [address]     Type II - follows with PCP  Ehler's-Danlos syndrome Dx: 2000    PCP    Fibromyalgia     Hx of blood clots 2000    Left lower leg - unsure of treatment    Hypertension     Middle cerebral aneurysm 2002    surgery @ [place]    Mitral valve regurgitation     Originally saw Dr. Michele Uriostegui, now follows with PCP (updated 1/13/2021)    Raynauds syndrome     Short-term memory loss 2002    due to aneurysm per pt       Objective:   Physical Exam  Constitutional:       Appearance: She is well-developed. HENT:      Head: Normocephalic and atraumatic. Eyes:      Pupils: Pupils are equal, round, and reactive to light. Neck:      Musculoskeletal: Normal range of motion. Pulmonary:      Effort: Pulmonary effort is normal.   Musculoskeletal: Normal range of motion. General: Tenderness present. No deformity. Right shoulder: She exhibits tenderness, pain and decreased strength. She exhibits normal range of motion, no bony tenderness, no swelling, no effusion, no crepitus, no deformity, no laceration, no spasm and normal pulse. Left shoulder: She exhibits normal range of motion, no tenderness, no bony tenderness, no swelling, no effusion, no crepitus, no deformity, no laceration, no pain, no spasm, normal pulse and normal strength. Right elbow: Normal.     Left elbow: Normal.   Skin:     General: Skin is warm and dry. Coloration: Skin is not pale. Findings: No erythema or rash. Neurological:      Mental Status: She is alert and oriented to person, place, and time. Sensory: No sensory deficit. Right shoulder-Incision clean, dry, intact, with no erythema, no drainage, and no signs of infection. Flexion 170  Abduction 150  External rotation 80  Internal rotation 75  Positive Nguyen sign. Strength 4/5 to resisted abduction.         Assessment:      Right shoulder rotator cuff repair with Regeneten, 4 months       Plan:      I discussed with her today that at this point given her worsening symptoms I will order a CT arthrogram of the right shoulder to evaluate for graft healing or new injury. She is not able to have an MRI due to aneurysm coils. Following this we may consider continuing physical therapy or possible cortisone injection. Continue weight-bearing as tolerated. Continue range of motion exercises as instructed. Ice and elevate as needed. Tylenol or Motrin for pain. Follow up after CT arthrogram for further evaluation treatment options.               Sam 97, DO

## 2021-05-05 NOTE — PATIENT INSTRUCTIONS
CT Arthrogram ordered  Continue to work on ROM and strengthening exercises per PT protocol  Continue to use Celexa and Methocarbamol as prescribed by PCP  Rest, ice, and elevate as needed  Weightbearing and activities as tolerated  Follow up with our office once CT Arthrogram is completed to discuss results      Central Scheduling [phone]

## 2021-05-05 NOTE — PROGRESS NOTES
Patient returns to the office for a follow up appointment on her right shoulder arthroscopy that was performed on 1/19/21. She continues to have anterior and anterior lateral shoulder pain which varies from a dull, aching to sharp pain upon movement associated with muscle spasms and continued stiffness which is affecting her ADLs. She is now being prescribed Celexa and Methocarbamol by her PCP, Dr. Oksana Aguirre which is providing her little relief for her symptoms. Pain is rated at a 7/10 with no improvement of her symptoms by the continued therapy sessions. No new injury reported at this time.

## 2021-05-20 ENCOUNTER — HOSPITAL ENCOUNTER (OUTPATIENT)
Dept: GENERAL RADIOLOGY | Age: 51
Discharge: HOME | End: 2021-05-20
Payer: COMMERCIAL

## 2021-05-20 ENCOUNTER — HOSPITAL ENCOUNTER (OUTPATIENT)
Dept: CT IMAGING | Age: 51
Discharge: HOME | End: 2021-05-20
Payer: COMMERCIAL

## 2021-05-20 DIAGNOSIS — Z98.890 S/P ARTHROSCOPY OF RIGHT SHOULDER: ICD-10-CM

## 2021-05-20 PROCEDURE — 73201 CT UPPER EXTREMITY W/DYE: CPT

## 2021-05-20 PROCEDURE — 6360000004 HC RX CONTRAST MEDICATION: Performed by: ORTHOPAEDIC SURGERY

## 2021-05-20 PROCEDURE — 73040 CONTRAST X-RAY OF SHOULDER: CPT

## 2021-05-20 RX ADMIN — IOPAMIDOL 12 ML: 755 INJECTION, SOLUTION INTRAVENOUS at 12:45

## 2021-05-26 ENCOUNTER — HOSPITAL ENCOUNTER (OUTPATIENT)
Dept: CT IMAGING | Age: 51
Discharge: HOME | End: 2021-05-26
Payer: COMMERCIAL

## 2021-05-26 DIAGNOSIS — Z98.890 S/P ARTHROSCOPY OF RIGHT SHOULDER: ICD-10-CM

## 2021-05-26 PROCEDURE — 73201 CT UPPER EXTREMITY W/DYE: CPT

## 2021-06-06 ENCOUNTER — APPOINTMENT (OUTPATIENT)
Dept: GENERAL RADIOLOGY | Age: 51
End: 2021-06-06
Payer: COMMERCIAL

## 2021-06-06 ENCOUNTER — HOSPITAL ENCOUNTER (EMERGENCY)
Age: 51
Discharge: HOME | End: 2021-06-06
Attending: EMERGENCY MEDICINE
Payer: COMMERCIAL

## 2021-06-06 VITALS
TEMPERATURE: 97.8 F | OXYGEN SATURATION: 97 % | RESPIRATION RATE: 18 BRPM | SYSTOLIC BLOOD PRESSURE: 166 MMHG | HEART RATE: 86 BPM | DIASTOLIC BLOOD PRESSURE: 97 MMHG
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
## 2021-06-06 DIAGNOSIS — S61.011A LACERATION OF RIGHT THUMB WITHOUT FOREIGN BODY WITHOUT DAMAGE TO NAIL, INITIAL ENCOUNTER: ICD-10-CM

## 2021-06-06 DIAGNOSIS — S61.219S LACERATION OF FINGER OF RIGHT HAND, SEQUELA: ICD-10-CM

## 2021-06-06 PROCEDURE — 90471 IMMUNIZATION ADMIN: CPT | Performed by: EMERGENCY MEDICINE

## 2021-06-06 PROCEDURE — 6370000000 HC RX 637 (ALT 250 FOR IP): Performed by: EMERGENCY MEDICINE

## 2021-06-06 PROCEDURE — 6360000002 HC RX W HCPCS: Performed by: EMERGENCY MEDICINE

## 2021-06-06 PROCEDURE — 90715 TDAP VACCINE 7 YRS/> IM: CPT | Performed by: EMERGENCY MEDICINE

## 2021-06-06 PROCEDURE — 99283 EMERGENCY DEPT VISIT LOW MDM: CPT

## 2021-06-06 PROCEDURE — 73130 X-RAY EXAM OF HAND: CPT

## 2021-06-06 PROCEDURE — 12001 RPR S/N/AX/GEN/TRNK 2.5CM/<: CPT

## 2021-06-06 RX ORDER — AMOXICILLIN AND CLAVULANATE POTASSIUM 875; 125 MG/1; MG/1
1 TABLET, FILM COATED ORAL 2 TIMES DAILY
Qty: 14 TABLET | Refills: 0 | Status: SHIPPED | OUTPATIENT
Start: 2021-06-06 | End: 2021-06-13

## 2021-06-06 RX ORDER — AMOXICILLIN AND CLAVULANATE POTASSIUM 875; 125 MG/1; MG/1
1 TABLET, FILM COATED ORAL ONCE
Status: COMPLETED | OUTPATIENT
Start: 2021-06-06 | End: 2021-06-06

## 2021-06-06 RX ADMIN — TETANUS TOXOID, REDUCED DIPHTHERIA TOXOID AND ACELLULAR PERTUSSIS VACCINE, ADSORBED 0.5 ML: 5; 2.5; 8; 8; 2.5 SUSPENSION INTRAMUSCULAR at 20:36

## 2021-06-06 RX ADMIN — AMOXICILLIN AND CLAVULANATE POTASSIUM 1 TABLET: 875; 125 TABLET, FILM COATED ORAL at 20:37

## 2021-06-06 ASSESSMENT — PAIN SCALES - GENERAL: PAINLEVEL_OUTOF10: 6

## 2021-06-06 NOTE — ED PROVIDER NOTES
CHIEF COMPLAINT  Chief Complaint   Patient presents with   [address] [place] [address]  Efrem Rogers is a 46 y.o. female with history of diabetes, Erler's Unknown Tk who presents with dog bite to the right hand that was sustained just prior to arrival.  The dog bite was caused by her pet who is up-to-date on his vaccines including rabies. It is a small to medium dog. The dog was tangled in the lead and was anxious, she was trying to pull the dog out of the lead and the dog's mouth closed on her hand several times. She has aching, burning pain with multiple linear lacerations overlying the palmar fifth finger as well as 1 larger laceration overlying the thumb overlying the PIP. Full range of motion and sensation. Symptoms moderate, aching discomfort. She states she knew she needed antibiotics and tetanus so she presented here.       REVIEW OF SYSTEMS  Review of Systems   History obtained from chart review and the patient  General ROS: negative for - fatigue    ENT: Negative for head trauma  Hematological and Lymphatic ROS: negative for - bruising  Endocrine ROS: negative for - unexpected weight changes  Respiratory ROS: no cough, shortness of breath, or wheezing  Cardiovascular ROS: no chest pain or dyspnea on exertion  Gastrointestinal ROS: no abdominal pain, change in bowel habits, or black or bloody stools  Genito-Urinary ROS: no dysuria, trouble voiding, or hematuria  Musculoskeletal ROS: negative for -numbness or weakness at the hand  Neurological ROS: negative for - weakness      PAST MEDICAL HISTORY  Past Medical History:   Diagnosis Date    Arthritis     Neck    Asthma     PCP - last episode: 2013    Diabetes mellitus ([facility] [address]     Type II - follows with PCP    Ehler's-Danlos syndrome Dx: 2000    PCP    Fibromyalgia     Hx of blood clots 2000    Left lower leg - unsure of treatment    Hypertension     Middle cerebral aneurysm 2002    surgery @ [place]    Mitral valve regurgitation     Originally saw Dr. Richar Santos, now follows with PCP (updated 1/13/2021)    Raynauds syndrome     Short-term memory loss 2002    due to aneurysm per pt       FAMILY HISTORY  Family History   Problem Relation Age of Onset    Diabetes Mother     Diabetes Father     Heart Disease Father        SOCIAL HISTORY  Social History     Socioeconomic History    Marital status:      Spouse name: None    Number of children: None    Years of education: None    Highest education level: None   Occupational History    None   Tobacco Use    Smoking status: Never Smoker    Smokeless tobacco: Never Used   Vaping Use    Vaping Use: Never used   Substance and Sexual Activity    Alcohol use: Yes     Comment: Rarely - 1 time a year    Drug use: No    Sexual activity: None   Other Topics Concern    None   Social History Narrative    None     Social Determinants of Health     Financial Resource Strain:     Difficulty of Paying Living Expenses:    Food Insecurity:     Worried About Running Out of Food in the Last Year:     Ran Out of Food in the Last Year:    Transportation Needs:     Lack of Transportation (Medical):      Lack of Transportation (Non-Medical):    Physical Activity:     Days of Exercise per Week:     Minutes of Exercise per Session:    Stress:     Feeling of Stress :    Social Connections:     Frequency of Communication with Friends and Family:     Frequency of Social Gatherings with Friends and Family:     Attends Buddhism Services:     Active Member of Clubs or Organizations:     Attends Club or Organization Meetings:     Marital Status:    Intimate Partner Violence:     Fear of Current or Ex-Partner:     Emotionally Abused:     Physically Abused:     Sexually Abused:        SURGICAL HISTORY  Past Surgical History:   Procedure Laterality Date    ANKLE SURGERY Right 2010    surgery x2   [address]    in left 5th digit    BRAIN SURGERY  2002    aneurysm repair   [address] [place] [address] 1996    COLONOSCOPY  12/2020    Diverticulosis - Dr. Jia Delgadillo, COLON, DIAGNOSTIC  11/2020    \"Took biopsy and was irregular\", has to repeat in Feb 2021   [address] Left 1990's    repair left elbow    SHOULDER ARTHROSCOPY Right 1/19/2021    RIGHT SHOULDER ARTHROSCOPY, SUBACROMIAL DECOMPRESSION DISTAL CLAVICLE RESECTION ROTATOR CUFF REPAIR DEBRIDEMENT performed by Jackie Buenrostro DO at [facility]      vein stripping on right x2 / vein stripping on left x8       CURRENT MEDICATIONS  No current facility-administered medications on file prior to encounter.      Current Outpatient Medications on File Prior to Encounter   Medication Sig Dispense Refill    TRULICITY 1.5 DUMONT/2.6EE SOPN INJECT 1.5MG UNDER THE SKIN  AS DIRECTED EVERY WEEK      celecoxib (CELEBREX) 200 MG capsule TAKE 1 CAPSULE BY MOUTH TWO TIMES A DAY AS NEEDED FOR PAIN      methocarbamol (ROBAXIN) 750 MG tablet TAKE 1 TABLET BY MOUTH TWO TIMES A DAY AS NEEDED      Semaglutide (OZEMPIC, 1 MG/DOSE, SC) Inject 2 mg into the skin once a week Takes on Sunday      Omega-3 Fatty Acids (FISH OIL) 1000 MG CPDR Take 1,000 mg by mouth daily      oxybutynin (DITROPAN-XL) 5 MG extended release tablet Take 5 mg by mouth 2 times daily      losartan (COZAAR) 100 MG tablet Take 100 mg by mouth daily      traZODone (DESYREL) 50 MG tablet Take 50 mg by mouth nightly as needed for Sleep      ibuprofen (ADVIL;MOTRIN) 800 MG tablet Take 800 mg by mouth every 6 hours as needed for Pain      glipiZIDE (GLUCOTROL) 10 MG tablet Take 10 mg by mouth daily      pioglitazone (ACTOS) 45 MG tablet Take 45 mg by mouth daily      tiZANidine (ZANAFLEX) 4 MG tablet Take 4 mg by mouth every 8 hours as needed      rosuvastatin (CRESTOR) 20 MG tablet Take 20 mg by mouth daily      naproxen (NAPROSYN) 500 MG tablet Take 1 tablet by mouth 2 times daily 60 tablet 0    acyclovir (ZOVIRAX) 400 MG tablet  magnesium oxide (MAG-OX) 400 MG tablet Take 400 mg by mouth daily      potassium chloride SA (K-DUR;KLOR-CON M) 20 MEQ tablet Take 20 mEq by mouth 2 times daily      vitamin D (MAXIMUM D3) 78346 UNITS CAPS capsule Take 10,000 Units by mouth daily (with breakfast)       butalbital-acetaminophen-caffeine (FIORICET) per tablet Take 1 tablet by mouth every 4 hours as needed for Pain. 180 tablet 0         ALLERGIES  Allergies   Allergen Reactions    Nubain [Nalbuphine Hcl]      Seizures      Secobarbital Sodium Anaphylaxis     SEIZURE    Sulfa Antibiotics Anaphylaxis    Adhesive Tape Rash    Demerol      seizures    Indocin [Indomethacin]        PHYSICAL EXAM  VITAL SIGNS: BP (!) 166/97   Pulse 86   Temp 97.8 °F (36.6 °C) (Oral)   Resp 18   SpO2 97%   Constitutional: Well developed, Well nourished, resting in chair, pleasant, active  HENT: Normocephalic, Atraumatic, Bilateral external ears normal, mask in place  Eyes: EOMI, Conjunctiva normal, No discharge. Neck: Normal range of motion, Supple, No stridor. Cardiovascular: Normal heart rate, Normal rhythm, No murmurs, No rubs, No gallops. Thorax & Lungs: Normal breath sounds, No respiratory distress, No wheezing, No chest tenderness. Abdomen: Bowel sounds normal, Soft, No tenderness, no guarding, no rebound, No masses, No pulsatile masses. Skin: Warm, Dry, No erythema, No rash. Extremities: Intact distal pulses, No edema, No tenderness, No cyanosis, No clubbing. Right hand: Multiple linear abrasions/lacerations overlying the palmar fifth finger, deeper laceration that is stellate at the right palmar thumb. Opposition and sensation and strength all digits intact. Musculoskeletal: Good gross range of motion in all major joints. No major deformities noted. Neurologic: Alert & oriented x 3, Normal gross motor function, Normal gross sensory function, No focal deficits noted.    Psychiatric: Affect normal      RADIOLOGY/PROCEDURES/LABS  Last initial encounter    -  Primary    Laceration of finger of right hand, sequela        Laceration of right thumb without foreign body without damage to nail, initial encounter          1.    2.   3.    Patient gave me permission to discuss medical history, care, and plan with those present in the room.   Electronically signed by: Gerda Hernandez MD, 6/6/2021  MD Gerda Lepe MD  06/06/21 [id number]

## 2021-06-07 NOTE — ED NOTES
Discharge instructions reviewed with pt and questions addressed at this time. Pt alert and oriented x 4 at time of discharge, no signs of acute distress noted. Pt ambulatory to Emergency Department waiting room and steady gait noted.         Denver Askew, RN  06/06/21 0092

## 2021-06-21 ENCOUNTER — OFFICE VISIT (OUTPATIENT)
Dept: ORTHOPEDIC SURGERY | Age: 51
End: 2021-06-21
Payer: COMMERCIAL

## 2021-06-21 VITALS
WEIGHT: 200 LBS | HEART RATE: 87 BPM | OXYGEN SATURATION: 97 % | RESPIRATION RATE: 16 BRPM | HEIGHT: 64 IN | BODY MASS INDEX: 34.15 KG/M2

## 2021-06-21 DIAGNOSIS — M75.32 CALCIFIC TENDINITIS OF LEFT SHOULDER: ICD-10-CM

## 2021-06-21 DIAGNOSIS — Z98.890 S/P ARTHROSCOPY OF RIGHT SHOULDER: Primary | ICD-10-CM

## 2021-06-21 DIAGNOSIS — M75.31 CALCIFIC TENDINITIS OF RIGHT SHOULDER REGION: ICD-10-CM

## 2021-06-21 PROCEDURE — 1036F TOBACCO NON-USER: CPT | Performed by: ORTHOPAEDIC SURGERY

## 2021-06-21 PROCEDURE — 99213 OFFICE O/P EST LOW 20 MIN: CPT | Performed by: ORTHOPAEDIC SURGERY

## 2021-06-21 PROCEDURE — 20610 DRAIN/INJ JOINT/BURSA W/O US: CPT | Performed by: ORTHOPAEDIC SURGERY

## 2021-06-21 PROCEDURE — G8417 CALC BMI ABV UP PARAM F/U: HCPCS | Performed by: ORTHOPAEDIC SURGERY

## 2021-06-21 PROCEDURE — 3017F COLORECTAL CA SCREEN DOC REV: CPT | Performed by: ORTHOPAEDIC SURGERY

## 2021-06-21 PROCEDURE — G8427 DOCREV CUR MEDS BY ELIG CLIN: HCPCS | Performed by: ORTHOPAEDIC SURGERY

## 2021-06-21 RX ORDER — DULOXETIN HYDROCHLORIDE 60 MG/1
60 CAPSULE, DELAYED RELEASE ORAL DAILY
COMMUNITY
End: 2021-09-15

## 2021-06-21 ASSESSMENT — ENCOUNTER SYMPTOMS
SHORTNESS OF BREATH: 0
COLOR CHANGE: 0

## 2021-06-21 NOTE — PATIENT INSTRUCTIONS
Steroid injection  Rest the bilateral shoulder for 24-48 hours  May continue to take prescribed pain medication and muscle relaxer as needed  Work on ROM and strengthening exercises   Rest, ice, and elevate as needed  Weightbearing and activities as tolerated  Follow up in 3 months

## 2021-06-21 NOTE — PROGRESS NOTES
Subjective:      Patient ID: Neri Pike is a 46 y.o. female. Patient returns to the office for a follow up on recent CT results that were completed on 5/26/21 of the right shoulder with no discrete tear of the rotator cuff identified or acute osseous abnormality. Calcific tendinosis of the distal infraspinatus tendon has been noted. Hx of right shoulder arthroscopy was performed on 1/19/21 with patient having continued variance of pain, muscle spasms,  and stiffness within the shoulder which limiting her ROM. Pain is rated at a 4/10 today with patient requesting steroid injection to be administered for both shoulders due to continued bilateral shoulder. CT SHOULDER RIGHT W CONTRAST    Impression  1. No discrete tear of the rotator cuff identified. 2. Intact labrum. 3. Calcific tendinosis of the distal infraspinatus tendon. 4. No acute osseous abnormality.           She comes in today and is now about 4 months out from her right shoulder arthroscopy with rotator cuff repair with Regeneten. She also comes in today for recheck of her bilateral shoulder pain and follow-up after CT arthrogram.  She states that since I saw her last she has continued dealing with a sharp, stabbing pain along the front and side of her right shoulder which is worse with overhead activities. She has continued physical therapy which has not been helping with her symptoms. Patient denies any new injury to the involved extremity/ joint, denies numbness or tingling in the involved extremity and denies fever or chills. Shoulder Pain   Pertinent negatives include no fever or numbness. Review of Systems   Constitutional: Negative for activity change, chills and fever. Respiratory: Negative for shortness of breath. Cardiovascular: Negative for chest pain. Musculoskeletal: Positive for arthralgias and myalgias. Negative for gait problem and joint swelling.    Skin: Negative for color change, pallor, rash and wound. Neurological: Positive for weakness. Negative for numbness. Past Medical History:   Diagnosis Date    Arthritis     Neck    Asthma     PCP - last episode: 2013    Diabetes mellitus ([address]     Type II - follows with PCP    Ehler's-Danlos syndrome Dx: 2000    PCP    Fibromyalgia     Hx of blood clots 2000    Left lower leg - unsure of treatment    Hypertension     Middle cerebral aneurysm 2002    surgery @ [place]    Mitral valve regurgitation     Originally saw Dr. Rain Goins, now follows with PCP (updated 1/13/2021)    Raynauds syndrome     Short-term memory loss 2002    due to aneurysm per pt       Objective:   Physical Exam  Constitutional:       Appearance: She is well-developed. HENT:      Head: Normocephalic and atraumatic. Eyes:      Pupils: Pupils are equal, round, and reactive to light. Pulmonary:      Effort: Pulmonary effort is normal.   Musculoskeletal:         General: Tenderness present. No deformity. Normal range of motion. Right shoulder: Tenderness present. No swelling, deformity, effusion, laceration, bony tenderness or crepitus. Normal range of motion. Decreased strength. Normal pulse. Left shoulder: No swelling, deformity, effusion, laceration, tenderness, bony tenderness or crepitus. Normal range of motion. Normal strength. Normal pulse. Right elbow: Normal.      Left elbow: Normal.      Cervical back: Normal range of motion. Skin:     General: Skin is warm and dry. Coloration: Skin is not pale. Findings: No erythema or rash. Neurological:      Mental Status: She is alert and oriented to person, place, and time. Sensory: No sensory deficit. Right shoulder-Incision clean, dry, intact, with no erythema, no drainage, and no signs of infection. Flexion 170  Abduction 150  External rotation 80  Internal rotation 75  Positive Nguyen sign. Strength 4/5 to resisted abduction.     Left shoulder-Skin intact with no erythema, ecchymosis or lacerations present. Flexion 180  Abduction 180  External rotation 90  Internal rotation 90  Moderate [facility]. Positive Nguyen sign. Strength 5/5 to resisted abduction. Assessment:      Right shoulder rotator cuff repair with Regeneten, 6 months       Plan:       I discussed with her today her CT arthrogram.  I reassured her that there is not appear to be any tear of her rotator cuff. She is not able to have an MRI due to aneurysm clips. At this point I recommend that we continue with conservative treatment. I discussed performing a cortisone injection with the patient today. The patient agrees and would like to proceed with the cortisone injection. I explained to them that we can repeat these injections every 3 months if needed. Continue weight-bearing as tolerated. Continue range of motion exercises as instructed. Ice and elevate as needed. Tylenol or Motrin for pain. Follow up in 3 months for recheck and additional cortisone injections if needed. Subacromial Shoulder Injection Procedure Note    Pre-operative Diagnosis: Right rotator cuff tendinitis    Post-operative Diagnosis: same    Indications: Symptomatic relief of tendinitis    Anesthesia: Lidocaine 1% and marcaine 0.5% without epinephrine without added sodium bicarbonate    Procedure Details     Verbal consent was obtained for the procedure. The right shoulder was prepped with alcohol. A 22 gauge needle was advanced into the right subacromial space through posterior approach without difficulty  The space was then injected with 1 ml 1% lidocaine and 1 ml 0.5% marcaine and 1 ml of triamcinolone (KENALOG) 40mg/ml. The injection site was cleansed with isopropyl alcohol and a dressing was applied. Complications:  None; patient tolerated the procedure well. Symptoms were improved immediately after the injection.     Subacromial Shoulder Injection Procedure Note    Pre-operative Diagnosis: left rotator cuff tendinitis    Post-operative Diagnosis: same    Indications: Symptomatic relief of tendinitis    Anesthesia: Lidocaine 1% and marcaine 0.5% without epinephrine without added sodium bicarbonate    Procedure Details     Verbal consent was obtained for the procedure. The shoulder was prepped with alcohol. A 22 gauge needle was advanced into the subacromial space through posterior approach without difficulty  The space was then injected with 1 ml 1% lidocaine and 1 ml 0.5% marcaine and 1 ml of triamcinolone (KENALOG) 40mg/ml. The injection site was cleansed with isopropyl alcohol and a dressing was applied. Complications:  None; patient tolerated the procedure well. Symptoms were improved immediately after the injection.               Sam 97, DO

## 2021-06-21 NOTE — PROGRESS NOTES
Patient returns to the office for a follow up on recent CT results that were completed on 5/26/21 of the right shoulder with no discrete tear of the rotator cuff identified or acute osseous abnormality. Calcific tendinosis of the distal infraspinatus tendon has been noted. Hx of right shoulder arthroscopy was performed on 1/19/21 with patient having continued variance of pain, muscle spasms,  and stiffness within the shoulder which limiting her ROM. Pain is rated at a 4/10 today with patient requesting steroid injection to be administered for both shoulders due to continued bilateral shoulder. CT SHOULDER RIGHT W CONTRAST     Impression   1. No discrete tear of the rotator cuff identified. 2. Intact labrum. 3. Calcific tendinosis of the distal infraspinatus tendon.    4. No acute osseous abnormality.

## 2021-09-15 ENCOUNTER — HOSPITAL ENCOUNTER (OUTPATIENT)
Dept: SLEEP CENTER | Age: 51
Discharge: HOME | End: 2021-09-15
Payer: COMMERCIAL

## 2021-09-15 VITALS
SYSTOLIC BLOOD PRESSURE: 109 MMHG | OXYGEN SATURATION: 96 % | BODY MASS INDEX: 35.85 KG/M2 | HEIGHT: 64 IN | HEART RATE: 89 BPM | DIASTOLIC BLOOD PRESSURE: 60 MMHG | WEIGHT: 210 LBS

## 2021-09-15 DIAGNOSIS — G47.33 OSA (OBSTRUCTIVE SLEEP APNEA): ICD-10-CM

## 2021-09-15 DIAGNOSIS — G47.10 HYPERSOMNIA: ICD-10-CM

## 2021-09-15 DIAGNOSIS — E66.9 OBESITY (BMI 30-39.9): ICD-10-CM

## 2021-09-15 PROCEDURE — 99204 OFFICE O/P NEW MOD 45 MIN: CPT | Performed by: INTERNAL MEDICINE

## 2021-09-15 PROCEDURE — 99211 OFF/OP EST MAY X REQ PHY/QHP: CPT

## 2021-09-15 RX ORDER — FAMOTIDINE 20 MG/1
TABLET, FILM COATED ORAL
COMMUNITY
Start: 2021-09-12

## 2021-09-15 RX ORDER — MELOXICAM 15 MG/1
TABLET ORAL
COMMUNITY
Start: 2021-09-12

## 2021-09-15 RX ORDER — DOXAZOSIN MESYLATE 4 MG/1
TABLET ORAL
COMMUNITY
Start: 2021-08-13

## 2021-09-15 RX ORDER — RIMEGEPANT SULFATE 75 MG/75MG
TABLET, ORALLY DISINTEGRATING ORAL
COMMUNITY

## 2021-09-15 ASSESSMENT — SLEEP AND FATIGUE QUESTIONNAIRES
HOW LIKELY ARE YOU TO NOD OFF OR FALL ASLEEP WHILE SITTING AND TALKING TO SOMEONE: 0
HOW LIKELY ARE YOU TO NOD OFF OR FALL ASLEEP WHILE SITTING QUIETLY AFTER LUNCH WITHOUT ALCOHOL: 0
HOW LIKELY ARE YOU TO NOD OFF OR FALL ASLEEP WHILE WATCHING TV: 3
HOW LIKELY ARE YOU TO NOD OFF OR FALL ASLEEP WHILE SITTING AND READING: 2
HOW LIKELY ARE YOU TO NOD OFF OR FALL ASLEEP WHILE LYING DOWN TO REST IN THE AFTERNOON WHEN CIRCUMSTANCES PERMIT: 3
HOW LIKELY ARE YOU TO NOD OFF OR FALL ASLEEP WHEN YOU ARE A PASSENGER IN A CAR FOR AN HOUR WITHOUT A BREAK: 3
HOW LIKELY ARE YOU TO NOD OFF OR FALL ASLEEP WHILE SITTING INACTIVE IN A PUBLIC PLACE: 0
ESS TOTAL SCORE: 13
HOW LIKELY ARE YOU TO NOD OFF OR FALL ASLEEP IN A CAR, WHILE STOPPED FOR A FEW MINUTES IN TRAFFIC: 2

## 2021-09-15 NOTE — CONSULTS
Miryam Chandler MD, Zo Pedro MD, Kentrell Beauchamp MD, Niki Fajardo MD, [facility]      [address], [address] [facility]   PH: [phone]  F: [phone]     Subjective:     Patient ID: Baylee Alexander is a 46 y.o. female, referred to the sleep center for   Chief Complaint   Patient presents with    Sleep Apnea   . Referring physician:  Dr. Margarita Lamb been referred for the CECE.     Symptoms:   [x]  Snoring                                                                    [x]  Dry Mouth  []  Choking                                                                   [x]  Morning Headaches  []  Gasping for Air                                                        []  Trouble Falling asleep  [x]  Tired during the daytime                                         []  Trouble Staying Asleep  [x]  Tired when you wake up                                         [x]  Weight Gain in Last 5 Years  [x]  Wake up frequently at night                                    []  Weight Loss in Last 5 Years  []  Shortness Of Breath                                               []  Shift Worker  []  Coughing                                                                []  Smoker (Previous or Current)  []  Chest Pain                                                              []  Anxiety  []  Trouble keeping your legs still at night                   []  Depression  []  Kicking your legs in your sleep                               []  Insomnia     [] Palpitation  []   Stop breathing      []  Other:     Significant Co-morbidities:  []  Congestive Heart Failure     []  COPD         []  Stroke (Past 30 Days)      []  Supplemental Oxygen Usage       []  Cognitive Impairment      []  Neuromuscular Problems  []  Epilepsy/Neurological Disorders           Social History     Socioeconomic History    Marital status:      Spouse name: Not on file    Number of children: Not on file    Years of education: Not on file    Highest education level: Not on file   Occupational History    Not on file   Tobacco Use    Smoking status: Never Smoker    Smokeless tobacco: Never Used   Vaping Use    Vaping Use: Never used   Substance and Sexual Activity    Alcohol use: Yes     Comment: Rarely - 1 time a year    Drug use: No    Sexual activity: Not on file   Other Topics Concern    Not on file   Social History Narrative    Not on file     Social Determinants of Health     Financial Resource Strain:     Difficulty of Paying Living Expenses:    Food Insecurity:     Worried About Running Out of Food in the Last Year:     [address] in the Last Year:    Transportation Needs:     Lack of Transportation (Medical):  Lack of Transportation (Non-Medical):    Physical Activity:     Days of Exercise per Week:     Minutes of Exercise per Session:    Stress:     Feeling of Stress :    Social Connections:     Frequency of Communication with Friends and Family:     Frequency of Social Gatherings with Friends and Family:     Attends Church Services:     Active Member of Clubs or Organizations:     Attends Club or Organization Meetings:     Marital Status:    Intimate Partner Violence:     Fear of Current or Ex-Partner:     Emotionally Abused:     Physically Abused:     Sexually Abused:        Prior to Admission medications    Medication Sig Start Date End Date Taking?  Authorizing Provider   famotidine (PEPCID) 20 MG tablet  9/12/21  Yes Historical Provider, MD   meloxicam (MOBIC) 15 MG tablet  9/12/21  Yes Historical Provider, MD   doxazosin (CARDURA) 4 MG tablet TAKE 1 TABLET BY MOUTH AT BEDTIME 8/13/21  Yes Historical Provider, MD   Rimegepant Sulfate (NURTEC) 75 MG TBDP Take by mouth   Yes Historical Provider, MD   TRULICITY 1.5 UR/1.2XO SOPN INJECT 1.5MG UNDER THE SKIN  AS DIRECTED EVERY WEEK 4/28/21  Yes Historical Provider, MD   methocarbamol (ROBAXIN) 750 MG tablet TAKE 1 TABLET BY MOUTH TWO TIMES A DAY AS NEEDED 4/23/21  Yes Historical Provider, MD   oxybutynin (DITROPAN-XL) 5 MG extended release tablet Take 5 mg by mouth 2 times daily   Yes Historical Provider, MD   losartan (COZAAR) 100 MG tablet Take 100 mg by mouth daily   Yes Historical Provider, MD   traZODone (DESYREL) 100 MG tablet Take 50 mg by mouth nightly as needed for Sleep    Yes Historical Provider, MD   ibuprofen (ADVIL;MOTRIN) 800 MG tablet Take 800 mg by mouth every 6 hours as needed for Pain    Yes Historical Provider, MD   glipiZIDE (GLUCOTROL) 10 MG tablet Take 10 mg by mouth daily   Yes Historical Provider, MD   rosuvastatin (CRESTOR) 20 MG tablet Take 20 mg by mouth daily   Yes Historical Provider, MD   naproxen (NAPROSYN) 500 MG tablet Take 1 tablet by mouth 2 times daily 10/15/20  Yes Werner Deluna MD   acyclovir (ZOVIRAX) 400 MG tablet  9/23/16  Yes Historical Provider, MD   magnesium oxide (MAG-OX) 400 MG tablet Take 400 mg by mouth daily   Yes Historical Provider, MD   potassium chloride SA (K-DUR;KLOR-CON M) 20 MEQ tablet Take 20 mEq by mouth 2 times daily    Yes Historical Provider, MD   vitamin D (MAXIMUM D3) 61344 UNITS CAPS capsule Take 10,000 Units by mouth daily (with breakfast)    Yes Historical Provider, MD   butalbital-acetaminophen-caffeine (FIORICET) per tablet Take 1 tablet by mouth every 4 hours as needed for Pain.  5/26/13  Yes Joy James MD   tiZANidine (ZANAFLEX) 4 MG tablet Take 4 mg by mouth every 8 hours as needed  Patient not taking: Reported on 6/21/2021    Historical Provider, MD       Allergies as of 09/15/2021 - Fully Reviewed 09/15/2021   Allergen Reaction Noted    Nubain [nalbuphine hcl]  08/31/2012    Secobarbital sodium Anaphylaxis 12/15/2016    Sulfa antibiotics Anaphylaxis 05/25/2013    Adhesive tape Rash 01/19/2021    Demerol  08/31/2012    Indocin [indomethacin]  06/21/2016       Patient Active Problem List   Diagnosis    Varicose veins of left lower extremity with inflammation    Varicose veins of lower extremities with complications    Venous hypertension of lower extremity    Varicose veins of left lower extremity with other complications    Status post phlebectomy    Superficial phlebitis of left leg    Calcific tendinitis of right shoulder region    CECE (obstructive sleep apnea)    Obesity (BMI 30-39. 9)    Hypersomnia       Past Medical History:   Diagnosis Date    Arthritis     Neck    Asthma     PCP - last episode: 2013    Diabetes mellitus ([facility] [address]     Type II - follows with PCP    Ehler's-Danlos syndrome Dx: 2000    PCP    Fibromyalgia     Hx of blood clots 2000    Left lower leg - unsure of treatment    Hypertension     Middle cerebral aneurysm 2002    surgery @ [facility] [address] Mitral valve regurgitation     Originally saw Dr. Lynn Ozuna, now follows with PCP (updated 1/13/2021)    Raynauds syndrome     Short-term memory loss 2002    due to aneurysm per pt       Past Surgical History:   Procedure Laterality Date    ANKLE SURGERY Right 2010    surgery x2   [address]    in left 5th digit    BRAIN SURGERY  2002    aneurysm repair   [address]    COLONOSCOPY  12/2020    Diverticulosis - Dr. Lexie Dyson, COLON, DIAGNOSTIC  11/2020    \"Took biopsy and was irregular\", has to repeat in Feb 2021   [facility]'s    repair left elbow    SHOULDER ARTHROSCOPY Right 1/19/2021    RIGHT SHOULDER ARTHROSCOPY, SUBACROMIAL DECOMPRESSION DISTAL CLAVICLE RESECTION ROTATOR CUFF REPAIR DEBRIDEMENT performed by Roger Srinivasan DO at [facility]      vein stripping on right x2 / vein stripping on left x8       Family History   Problem Relation Age of Onset    Diabetes Mother     Diabetes Father     Heart Disease Father          Objective:   /60   Pulse 89   Ht 5' 4\" (1.626 m)   Wt 210 lb (95.3 kg)   SpO2 96%   BMI 36.05 kg/m²   Body mass index is 36.05 kg/m². Sleep Medicine 9/15/2021   Sitting and reading 2   Watching TV 3   Sitting, inactive in a public place (e.g. a theatre or a meeting) 0   As a passenger in a car for an hour without a break 3   Lying down to rest in the afternoon when circumstances permit 3   Sitting and talking to someone 0   Sitting quietly after a lunch without alcohol 0   In a car, while stopped for a few minutes in traffic 2   Total score 13   Neck circumference 16     {MALLAMPATI:3    Vitals:    09/15/21 1450   BP: 109/60   Pulse: 89   SpO2: 96%   Weight: 210 lb (95.3 kg)   Height: 5' 4\" (1.626 m)     Neck circumference: 16  Inches  [place] - Total score: 13    Gen: No distress. Eyes: PERRL. No sclera icterus. No conjunctival injection. ENT: No discharge. Pharynx clear. External appearance of ears and nose normal.  Neck: Trachea midline. No obvious mass. Resp: No accessory muscle use. No crackles. No wheezes. No rhonchi. No dullness on percussion. CV: Regular rate. Regular rhythm. No murmur or rub. No edema. GI: Non-tender. Non-distended. No hernia. Skin: Warm, dry, normal texture and turgor. No nodule on exposed extremities. Lymph: No cervical LAD. No supraclavicular LAD. M/S: No cyanosis. No clubbing. No joint deformity. Psych: Oriented x 3. No anxiety. Awake. Alert. Intact judgement and insight. Mallampati Airway Classification:   []1 []2 [x]3 []4        Assessment and Plan     Diagnosis:    Problem List        Pulmonary Problems    CECE (obstructive sleep apnea)      She has symptoms of CECE  Advised to go for the sleep study  Loose weight         Relevant Orders    Baseline Diagnostic Sleep Study       Other    Obesity (BMI 30-39. 9)      Advised to loose weight with diet and exercise           Relevant Medications    magnesium oxide (MAG-OX) 400 MG tablet    glipiZIDE (GLUCOTROL) 10 MG tablet    TRULICITY 1.5 AJ/2.4JJ

## 2021-09-16 ENCOUNTER — HOSPITAL ENCOUNTER (OUTPATIENT)
Dept: SLEEP CENTER | Age: 51
Discharge: HOME | End: 2021-09-16
Payer: COMMERCIAL

## 2021-09-16 DIAGNOSIS — G47.33 OSA (OBSTRUCTIVE SLEEP APNEA): ICD-10-CM

## 2021-09-16 PROCEDURE — 95810 POLYSOM 6/> YRS 4/> PARAM: CPT

## 2021-09-17 ASSESSMENT — SLEEP AND FATIGUE QUESTIONNAIRES
HOW LIKELY ARE YOU TO NOD OFF OR FALL ASLEEP WHILE SITTING AND READING: 2
HOW LIKELY ARE YOU TO NOD OFF OR FALL ASLEEP WHILE SITTING QUIETLY AFTER LUNCH WITHOUT ALCOHOL: 0
HOW LIKELY ARE YOU TO NOD OFF OR FALL ASLEEP WHILE WATCHING TV: 3
ESS TOTAL SCORE: 13
HOW LIKELY ARE YOU TO NOD OFF OR FALL ASLEEP WHILE SITTING INACTIVE IN A PUBLIC PLACE: 0
NECK CIRCUMFERENCE (INCHES): 16
HOW LIKELY ARE YOU TO NOD OFF OR FALL ASLEEP WHILE SITTING AND TALKING TO SOMEONE: 0
HOW LIKELY ARE YOU TO NOD OFF OR FALL ASLEEP IN A CAR, WHILE STOPPED FOR A FEW MINUTES IN TRAFFIC: 2
HOW LIKELY ARE YOU TO NOD OFF OR FALL ASLEEP WHEN YOU ARE A PASSENGER IN A CAR FOR AN HOUR WITHOUT A BREAK: 3
HOW LIKELY ARE YOU TO NOD OFF OR FALL ASLEEP WHILE LYING DOWN TO REST IN THE AFTERNOON WHEN CIRCUMSTANCES PERMIT: 3

## 2021-09-17 NOTE — PROGRESS NOTES
9/17/2021  sleep study  for Rosy Doyle  1970 is complete. Results are pending physician review.     Electronically signed by Bhargavi Mcdowell on 9/17/2021 at 5:10 AM

## 2021-09-20 ENCOUNTER — OFFICE VISIT (OUTPATIENT)
Dept: ORTHOPEDIC SURGERY | Age: 51
End: 2021-09-20
Payer: COMMERCIAL

## 2021-09-20 VITALS
WEIGHT: 210 LBS | OXYGEN SATURATION: 97 % | HEART RATE: 102 BPM | BODY MASS INDEX: 35.85 KG/M2 | HEIGHT: 64 IN | RESPIRATION RATE: 15 BRPM

## 2021-09-20 DIAGNOSIS — M75.32 CALCIFIC TENDINITIS OF LEFT SHOULDER: ICD-10-CM

## 2021-09-20 DIAGNOSIS — M25.511 ACUTE PAIN OF RIGHT SHOULDER: ICD-10-CM

## 2021-09-20 DIAGNOSIS — M25.512 CHRONIC PAIN OF BOTH SHOULDERS: Primary | ICD-10-CM

## 2021-09-20 DIAGNOSIS — Z98.890 S/P ARTHROSCOPY OF RIGHT SHOULDER: ICD-10-CM

## 2021-09-20 DIAGNOSIS — M75.31 CALCIFIC TENDINITIS OF RIGHT SHOULDER: ICD-10-CM

## 2021-09-20 DIAGNOSIS — M75.31 CALCIFIC TENDINITIS OF RIGHT SHOULDER REGION: ICD-10-CM

## 2021-09-20 DIAGNOSIS — G89.29 CHRONIC PAIN OF BOTH SHOULDERS: Primary | ICD-10-CM

## 2021-09-20 DIAGNOSIS — M25.511 CHRONIC PAIN OF BOTH SHOULDERS: Primary | ICD-10-CM

## 2021-09-20 PROCEDURE — 1036F TOBACCO NON-USER: CPT | Performed by: ORTHOPAEDIC SURGERY

## 2021-09-20 PROCEDURE — 99213 OFFICE O/P EST LOW 20 MIN: CPT | Performed by: ORTHOPAEDIC SURGERY

## 2021-09-20 PROCEDURE — 3017F COLORECTAL CA SCREEN DOC REV: CPT | Performed by: ORTHOPAEDIC SURGERY

## 2021-09-20 PROCEDURE — G8428 CUR MEDS NOT DOCUMENT: HCPCS | Performed by: ORTHOPAEDIC SURGERY

## 2021-09-20 PROCEDURE — G8417 CALC BMI ABV UP PARAM F/U: HCPCS | Performed by: ORTHOPAEDIC SURGERY

## 2021-09-20 PROCEDURE — 20610 DRAIN/INJ JOINT/BURSA W/O US: CPT | Performed by: ORTHOPAEDIC SURGERY

## 2021-09-20 RX ORDER — CLOTRIMAZOLE AND BETAMETHASONE DIPROPIONATE 10; .64 MG/G; MG/G
CREAM TOPICAL
COMMUNITY
Start: 2021-09-07

## 2021-09-20 RX ORDER — FLUTICASONE PROPIONATE 50 MCG
SPRAY, SUSPENSION (ML) NASAL
COMMUNITY
Start: 2021-09-02

## 2021-09-20 RX ORDER — DOXEPIN HYDROCHLORIDE 50 MG/1
CAPSULE ORAL
COMMUNITY
Start: 2021-09-08

## 2021-09-20 RX ORDER — OXYBUTYNIN CHLORIDE 5 MG/1
TABLET ORAL
COMMUNITY
Start: 2021-07-24

## 2021-09-20 RX ORDER — MEDROXYPROGESTERONE ACETATE 150 MG/ML
INJECTION, SUSPENSION INTRAMUSCULAR
COMMUNITY
Start: 2021-09-10

## 2021-09-20 RX ORDER — LOSARTAN POTASSIUM AND HYDROCHLOROTHIAZIDE 25; 100 MG/1; MG/1
TABLET ORAL
COMMUNITY
Start: 2021-08-09

## 2021-09-20 RX ORDER — DOXYCYCLINE HYCLATE 100 MG
TABLET ORAL
COMMUNITY
Start: 2021-07-09

## 2021-09-20 NOTE — PROGRESS NOTES
Patient returns to the office today for fu of the bilateral shoulder injections given on 6/21/21. Pt states pain today is an 8/10 in the right shoulder 7/10 in the left. Pt states the injections do help with her pain for about a month. She does have better ROM just can not pick anything heavy up.  Pt states she would like more injections today in the shoulders

## 2021-09-20 NOTE — PATIENT INSTRUCTIONS
Continue weight-bearing as tolerated. Continue range of motion exercises as instructed. Ice and elevate as needed. Tylenol or Motrin for pain.   Steroid injection given today in the bilateral shoulders   Follow up in 3 months

## 2021-09-21 ASSESSMENT — ENCOUNTER SYMPTOMS
COLOR CHANGE: 0
SHORTNESS OF BREATH: 0

## 2021-09-21 NOTE — PROGRESS NOTES
Subjective:      Patient ID: Ronald Soto is a 46 y.o. female. Patient returns to the office today for fu of the bilateral shoulder injections given on 6/21/21. Pt states pain today is an 8/10 in the right shoulder 7/10 in the left. Pt states the injections do help with her pain for about a month. She does have better ROM just can not pick anything heavy up. Pt states she would like more injections today in the shoulders         She comes in today for recheck of her bilateral shoulder pain. She states that since her last injection she has been doing very well and they gave her about 2 months of relief of her symptoms. However now she is beginning to have sharp, stabbing pain in both of her shoulders again which is worse with overhead activities and reaching behind her back. Patient denies any new injury to the involved extremity/ joint, denies numbness or tingling in the involved extremity and denies fever or chills. She would like additional injections today. Shoulder Pain   Pertinent negatives include no fever or numbness. Review of Systems   Constitutional: Negative for activity change, chills and fever. Respiratory: Negative for shortness of breath. Cardiovascular: Negative for chest pain. Musculoskeletal: Positive for arthralgias and myalgias. Negative for gait problem and joint swelling. Skin: Negative for color change, pallor, rash and wound. Neurological: Positive for weakness. Negative for numbness.        Past Medical History:   Diagnosis Date    Arthritis     Neck    Asthma     PCP - last episode: 2013    Diabetes mellitus ([facility] [address]     Type II - follows with PCP    Ehler's-Danlos syndrome Dx: 2000    PCP    Fibromyalgia     Hx of blood clots 2000    Left lower leg - unsure of treatment    Hypertension     Middle cerebral aneurysm 2002    surgery @ [place]    Mitral valve regurgitation     Originally saw Dr. Marvel Dancer, now follows with PCP (updated 1/13/2021)  Raynauds syndrome     Short-term memory loss 2002    due to aneurysm per pt       Objective:   Physical Exam  Constitutional:       Appearance: She is well-developed. HENT:      Head: Normocephalic and atraumatic. Eyes:      Pupils: Pupils are equal, round, and reactive to light. Pulmonary:      Effort: Pulmonary effort is normal.   Musculoskeletal:         General: Tenderness present. No deformity. Normal range of motion. Right shoulder: Tenderness and crepitus present. No swelling, deformity, effusion, laceration or bony tenderness. Normal range of motion. Normal strength. Normal pulse. Left shoulder: Tenderness and crepitus present. No swelling, deformity, effusion, laceration or bony tenderness. Normal range of motion. Normal strength. Normal pulse. Right elbow: Normal.      Left elbow: Normal.      Cervical back: Normal range of motion. Skin:     General: Skin is warm and dry. Coloration: Skin is not pale. Findings: No erythema or rash. Neurological:      Mental Status: She is alert and oriented to person, place, and time. Sensory: No sensory deficit. Right shoulder-Incision clean, dry, intact, with no erythema, no drainage, and no signs of infection. Flexion 170  Abduction 150  External rotation 80  Internal rotation 75  Positive Nguyen sign. Strength 5/5 to resisted abduction. Left shoulder-Skin intact with no erythema, ecchymosis or lacerations present. Flexion 180  Abduction 180  External rotation 90  Internal rotation 90  Moderate tenderness to the [facility] joint. Positive Nguyen sign. Strength 5/5 to resisted abduction. Assessment:      Right shoulder impingement  Left shoulder impingement  Prior right shoulder arthroscopy with Kate. Plan:       I discussed with her today her response to cortisone injection for her shoulders. At this point given her improved symptoms I recommend that we continue with conservative treatment.   I discussed performing a cortisone injections with the patient today. The patient agrees and would like to proceed with the cortisone injections. I explained to them that we can repeat these injections every 3 months if needed. Continue weight-bearing as tolerated. Continue range of motion exercises as instructed. Ice and elevate as needed. Tylenol or Motrin for pain. Follow up in 3 months for recheck and additional cortisone injections if needed. Subacromial Shoulder Injection Procedure Note    Pre-operative Diagnosis: Right rotator cuff tendinitis    Post-operative Diagnosis: same    Indications: Symptomatic relief of tendinitis    Anesthesia: Lidocaine 1% and marcaine 0.5% without epinephrine without added sodium bicarbonate    Procedure Details     Verbal consent was obtained for the procedure. The right shoulder was prepped with alcohol. A 22 gauge needle was advanced into the right subacromial space through posterior approach without difficulty  The space was then injected with 1 ml 1% lidocaine and 1 ml 0.5% marcaine and 1 ml of triamcinolone (KENALOG) 40mg/ml. The injection site was cleansed with isopropyl alcohol and a dressing was applied. Complications:  None; patient tolerated the procedure well. Symptoms were improved immediately after the injection. Subacromial Shoulder Injection Procedure Note    Pre-operative Diagnosis: left rotator cuff tendinitis    Post-operative Diagnosis: same    Indications: Symptomatic relief of tendinitis    Anesthesia: Lidocaine 1% and marcaine 0.5% without epinephrine without added sodium bicarbonate    Procedure Details     Verbal consent was obtained for the procedure. The shoulder was prepped with alcohol. A 22 gauge needle was advanced into the subacromial space through posterior approach without difficulty  The space was then injected with 1 ml 1% lidocaine and 1 ml 0.5% marcaine and 1 ml of triamcinolone (KENALOG) 40mg/ml.  The injection site was cleansed with isopropyl alcohol and a dressing was applied. Complications:  None; patient tolerated the procedure well. Symptoms were improved immediately after the injection.               Sam 97, DO

## 2021-09-22 LAB — STATUS: NORMAL

## 2021-09-22 PROCEDURE — 95810 POLYSOM 6/> YRS 4/> PARAM: CPT | Performed by: INTERNAL MEDICINE

## 2021-09-30 ENCOUNTER — HOSPITAL ENCOUNTER (OUTPATIENT)
Dept: SLEEP CENTER | Age: 51
Discharge: HOME | End: 2021-09-30
Payer: COMMERCIAL

## 2021-09-30 DIAGNOSIS — G47.10 HYPERSOMNIA: ICD-10-CM

## 2021-09-30 DIAGNOSIS — G47.33 OSA (OBSTRUCTIVE SLEEP APNEA): ICD-10-CM

## 2021-09-30 DIAGNOSIS — E66.9 OBESITY (BMI 30-39.9): ICD-10-CM

## 2021-09-30 PROCEDURE — 9990000010 HC NO CHARGE VISIT

## 2021-09-30 PROCEDURE — 99214 OFFICE O/P EST MOD 30 MIN: CPT | Performed by: INTERNAL MEDICINE

## 2021-09-30 ASSESSMENT — ENCOUNTER SYMPTOMS
SHORTNESS OF BREATH: 0
BACK PAIN: 0
ABDOMINAL PAIN: 0
ABDOMINAL DISTENTION: 0
COUGH: 0
EYE DISCHARGE: 0
EYE ITCHING: 0

## 2021-09-30 NOTE — ASSESSMENT & PLAN NOTE
She has no CECE on the PSG  But her EDS is sec to the medications she is on  Loose weight  Sleep hygiene measures

## 2021-09-30 NOTE — PROGRESS NOTES
SECTION  1996    COLONOSCOPY  2020    Diverticulosis - Dr. Viviana Puente, COLON, DIAGNOSTIC  2020    \"Took biopsy and was irregular\", has to repeat in 2021   [address]     repair left elbow    SHOULDER ARTHROSCOPY Right 2021    RIGHT SHOULDER ARTHROSCOPY, SUBACROMIAL DECOMPRESSION DISTAL CLAVICLE RESECTION ROTATOR CUFF REPAIR DEBRIDEMENT performed by Endy Liu DO at [facility]      vein stripping on right x2 / vein stripping on left x8       Social History     Socioeconomic History    Marital status:      Spouse name: Not on file    Number of children: Not on file    Years of education: Not on file    Highest education level: Not on file   Occupational History    Not on file   Tobacco Use    Smoking status: Never Smoker    Smokeless tobacco: Never Used   Vaping Use    Vaping Use: Never used   Substance and Sexual Activity    Alcohol use: Yes     Comment: Rarely - 1 time a year    Drug use: No    Sexual activity: Not on file   Other Topics Concern    Not on file   Social History Narrative    Not on file     Social Determinants of Health     Financial Resource Strain:     Difficulty of Paying Living Expenses:    Food Insecurity:     Worried About Running Out of Food in the Last Year:     [address] in the Last Year:    Transportation Needs:     Lack of Transportation (Medical):      Lack of Transportation (Non-Medical):    Physical Activity:     Days of Exercise per Week:     Minutes of Exercise per Session:    Stress:     Feeling of Stress :    Social Connections:     Frequency of Communication with Friends and Family:     Frequency of Social Gatherings with Friends and Family:     Attends [employer] Services:     Active Member of Clubs or Organizations:     Attends Club or Organization Meetings:     Marital Status:    Intimate Partner Violence:     Fear of Current or Ex-Partner:     Emotionally Abused:     Physically Abused:     Sexually Abused:        Review of Systems   Constitutional: Positive for fatigue. HENT: Negative for congestion and postnasal drip. Eyes: Negative for discharge and itching. Respiratory: Negative for cough and shortness of breath. Cardiovascular: Negative for chest pain and leg swelling. Gastrointestinal: Negative for abdominal distention and abdominal pain. Endocrine: Negative for cold intolerance and heat intolerance. Genitourinary: Negative for enuresis and frequency. Musculoskeletal: Negative for arthralgias and back pain. Allergic/Immunologic: Negative for environmental allergies and food allergies. Neurological: Negative for light-headedness and headaches. Hematological: Negative for adenopathy. Psychiatric/Behavioral: Negative for agitation and behavioral problems. Objective: There were no vitals taken for this visit. There is no height or weight on file to calculate BMI. Sleep Medicine 9/17/2021 9/15/2021   Sitting and reading 2 2   Watching TV 3 3   Sitting, inactive in a public place (e.g. a theatre or a meeting) 0 0   As a passenger in a car for an hour without a break 3 3   Lying down to rest in the afternoon when circumstances permit 3 3   Sitting and talking to someone 0 0   Sitting quietly after a lunch without alcohol 0 0   In a car, while stopped for a few minutes in traffic 2 2   Total score 13 13   Neck circumference 16 16     {MALLAMPATI:3    Physical Exam  Vitals reviewed. Constitutional:       Appearance: Normal appearance. Comments: Obesity   HENT:      Head: Normocephalic and atraumatic. Nose: Nose normal.      Mouth/Throat:      Mouth: Mucous membranes are moist.   Eyes:      Extraocular Movements: Extraocular movements intact. Pupils: Pupils are equal, round, and reactive to light. Cardiovascular:      Rate and Rhythm: Normal rate and regular rhythm. Pulses: Normal pulses. Heart sounds: Normal heart sounds. Pulmonary:      Effort: Pulmonary effort is normal.      Breath sounds: Normal breath sounds. Abdominal:      General: Abdomen is flat. Palpations: Abdomen is soft. Musculoskeletal:         General: Normal range of motion. Cervical back: Normal range of motion and neck supple. Skin:     General: Skin is warm and dry. Neurological:      General: No focal deficit present. Mental Status: She is alert and oriented to person, place, and time. Psychiatric:         Mood and Affect: Mood normal.         Behavior: Behavior normal.         Radiology: None    Assessment and Plan     Problem List        Pulmonary Problems    CECE (obstructive sleep apnea)      She has no CECE on the PSG  But her EDS is sec to the medications she is on  Loose weight  Sleep hygiene measures            Other    Obesity (BMI 30-39. 9)      Advised to loose weight with diet and exercise           Relevant Medications    magnesium oxide (MAG-OX) 400 MG tablet    glipiZIDE (GLUCOTROL) 10 MG tablet    TRULICITY 1.5 AJ/9.2JT SOPN    Hypersomnia     It appears to be sec to the medications she is on  Sleep Hygiene measures                      Follow-Up:    No follow-ups on file.      Progress notes sent to the referring Provider    Antoine Croew MD MD  9/30/2021  9:44 AM

## 2021-12-20 ENCOUNTER — OFFICE VISIT (OUTPATIENT)
Dept: ORTHOPEDIC SURGERY | Age: 51
End: 2021-12-20
Payer: COMMERCIAL

## 2021-12-20 VITALS
OXYGEN SATURATION: 99 % | RESPIRATION RATE: 16 BRPM | HEART RATE: 80 BPM | BODY MASS INDEX: 35.85 KG/M2 | HEIGHT: 64 IN | WEIGHT: 210 LBS

## 2021-12-20 DIAGNOSIS — M25.512 CHRONIC PAIN OF BOTH SHOULDERS: Primary | ICD-10-CM

## 2021-12-20 DIAGNOSIS — M25.511 CHRONIC PAIN OF BOTH SHOULDERS: Primary | ICD-10-CM

## 2021-12-20 DIAGNOSIS — G89.29 CHRONIC PAIN OF BOTH SHOULDERS: Primary | ICD-10-CM

## 2021-12-20 PROCEDURE — G8484 FLU IMMUNIZE NO ADMIN: HCPCS | Performed by: ORTHOPAEDIC SURGERY

## 2021-12-20 PROCEDURE — 3017F COLORECTAL CA SCREEN DOC REV: CPT | Performed by: ORTHOPAEDIC SURGERY

## 2021-12-20 PROCEDURE — G8427 DOCREV CUR MEDS BY ELIG CLIN: HCPCS | Performed by: ORTHOPAEDIC SURGERY

## 2021-12-20 PROCEDURE — G8417 CALC BMI ABV UP PARAM F/U: HCPCS | Performed by: ORTHOPAEDIC SURGERY

## 2021-12-20 PROCEDURE — 99212 OFFICE O/P EST SF 10 MIN: CPT | Performed by: ORTHOPAEDIC SURGERY

## 2021-12-20 PROCEDURE — 1036F TOBACCO NON-USER: CPT | Performed by: ORTHOPAEDIC SURGERY

## 2021-12-20 ASSESSMENT — ENCOUNTER SYMPTOMS
COLOR CHANGE: 0
SHORTNESS OF BREATH: 0

## 2021-12-20 NOTE — PROGRESS NOTES
FUTURE VISIT INFORMATION      FUTURE VISIT INFORMATION:    Date: 10/14/19    Time: 12:15PM    Location: [facility]  REFERRAL INFORMATION:    Referring provider:      Referring providers clinic:      Reason for visit/diagnosis  left cheek melanoma     RECORDS REQUESTED FROM:       Clinic name Comments Records Status Imaging Status   [facility] ENT 10/3/19 notes with Dr Yaw [place]     pathology  10/3/19 skin cheek biopsy (Accession: [id number] - consulted at [facility])  10/9/19 skin cheek biopsy (Acession: [id number] - [facility] pathology that was consulted on the [place]) [facility]                              *pathology pending for 10/11/19 biopsy in clinic   Subjective:      Patient ID: Marychuy Beach is a 46 y.o. female. Cielo Willett is a 46 y.o. female returning to the office complaining of chronic pain in both shoulders. She comes in today for recheck of her bilateral shoulder pain. She states that her last set of injections did not give her any relief of her symptoms. She also states that her glucose level skyrocketed after her last set of injections. She states that she has continued having sharp, stabbing pain in both of her shoulders again which is worse with overhead activities and reaching behind her back. Patient denies any new injury to the involved extremity/ joint, denies numbness or tingling in the involved extremity and denies fever or chills. Shoulder Pain   Pertinent negatives include no fever or numbness. Review of Systems   Constitutional: Negative for activity change, chills and fever. Respiratory: Negative for shortness of breath. Cardiovascular: Negative for chest pain. Musculoskeletal: Positive for arthralgias and myalgias. Negative for gait problem and joint swelling. Skin: Negative for color change, pallor, rash and wound. Neurological: Positive for weakness. Negative for numbness. Past Medical History:   Diagnosis Date    Arthritis     Neck    Asthma     PCP - last episode: 2013    Diabetes mellitus ([facility] [address]     Type II - follows with PCP    Ehler's-Danlos syndrome Dx: 2000    PCP    Fibromyalgia     Hx of blood clots 2000    Left lower leg - unsure of treatment    Hypertension     Middle cerebral aneurysm 2002    surgery @ [facility]    Mitral valve regurgitation     Originally saw Dr. Isadora Weller, now follows with PCP (updated 1/13/2021)    Raynauds syndrome     Short-term memory loss 2002    due to aneurysm per pt       Objective:   Physical Exam  Constitutional:       Appearance: She is well-developed. HENT:      Head: Normocephalic and atraumatic.    Eyes:      Pupils: Pupils are ANESTHESIA POSTOP CHECK    63y Male POSTOP DAY 1 S/P Revision of L TMA and L Achilles tendon lengthening    Vital Signs Last 24 Hrs  T(C): 37.2 (09 Aug 2019 08:13), Max: 37.2 (09 Aug 2019 08:13)  T(F): 99 (09 Aug 2019 08:13), Max: 99 (09 Aug 2019 08:13)  HR: 86 (09 Aug 2019 08:13) (75 - 86)  BP: 139/69 (09 Aug 2019 08:13) (136/73 - 175/76)  BP(mean): --  RR: 17 (09 Aug 2019 08:13) (16 - 18)  SpO2: 96% (09 Aug 2019 08:13) (96% - 99%)  I&O's Summary    08 Aug 2019 07:01  -  09 Aug 2019 07:00  --------------------------------------------------------  IN: 440 mL / OUT: 250 mL / NET: 190 mL        [x ] NO APPARENT ANESTHESIA COMPLICATIONS      Comments: months for recheck.               Sam 97, DO

## 2021-12-20 NOTE — PATIENT INSTRUCTIONS
Physical therapy was ordered  Weightbearing and activities as tolerated  May take Ibuprofen or Motrin as needed  Rest, ice, and elevate as needed  Work on ROM and strengthening exercises as discussed  Follow up in 2 months

## 2021-12-20 NOTE — PROGRESS NOTES
Jaci Glez is a 46 y.o. female returning to the office complaining of chronic pain in both shoulders.

## 2021-12-24 ENCOUNTER — APPOINTMENT (OUTPATIENT)
Dept: GENERAL RADIOLOGY | Age: 51
End: 2021-12-24
Payer: COMMERCIAL

## 2021-12-24 ENCOUNTER — HOSPITAL ENCOUNTER (EMERGENCY)
Age: 51
Discharge: HOME | End: 2021-12-24
Payer: COMMERCIAL

## 2021-12-24 VITALS
HEART RATE: 93 BPM | HEIGHT: 64 IN | OXYGEN SATURATION: 97 % | RESPIRATION RATE: 17 BRPM | BODY MASS INDEX: 35 KG/M2 | WEIGHT: 205 LBS | DIASTOLIC BLOOD PRESSURE: 89 MMHG | TEMPERATURE: 98.3 F | SYSTOLIC BLOOD PRESSURE: 166 MMHG

## 2021-12-24 DIAGNOSIS — S61.011A LACERATION OF RIGHT THUMB WITHOUT FOREIGN BODY WITHOUT DAMAGE TO NAIL, INITIAL ENCOUNTER: Primary | ICD-10-CM

## 2021-12-24 PROCEDURE — 2500000003 HC RX 250 WO HCPCS: Performed by: PHYSICIAN ASSISTANT

## 2021-12-24 PROCEDURE — 73140 X-RAY EXAM OF FINGER(S): CPT

## 2021-12-24 PROCEDURE — 12002 RPR S/N/AX/GEN/TRNK2.6-7.5CM: CPT

## 2021-12-24 PROCEDURE — 99284 EMERGENCY DEPT VISIT MOD MDM: CPT

## 2021-12-24 RX ORDER — BUPIVACAINE HYDROCHLORIDE 5 MG/ML
10 INJECTION, SOLUTION EPIDURAL; INTRACAUDAL ONCE
Status: COMPLETED | OUTPATIENT
Start: 2021-12-24 | End: 2021-12-24

## 2021-12-24 RX ADMIN — BUPIVACAINE HYDROCHLORIDE 50 MG: 5 INJECTION, SOLUTION EPIDURAL; INTRACAUDAL at 18:32

## 2021-12-24 ASSESSMENT — PAIN SCALES - GENERAL: PAINLEVEL_OUTOF10: 5

## 2021-12-24 NOTE — ED PROVIDER NOTES
**ADVANCED PRACTICE PROVIDER, I HAVE EVALUATED THIS PATIENT**        [address] ENCOUNTER      Pt Name: Mariah Joe  EXB:[id number]  Remediosgfkali 1970  Date of evaluation: 12/24/2021  Provider: Maris Nair PA-C      Chief Complaint:    Chief Complaint   Patient presents with    Laceration     right thumb         Nursing Notes, Past Medical Hx, Past Surgical Hx, Social Hx, Allergies, and Family Hx were all reviewed and agreed with or any disagreements were addressed in the HPI.    HPI: (Location, Duration, Timing, Severity, Quality, Assoc Sx, Context, Modifying factors)    Chief Complaint of right thumb laceration    This is a  46 y.o. female who presents  with laceration to her right thumb caused when she accidentally sliced her hand using a steak knife and attempt to unpack it as a dog toy. This occurred just prior to arrival.  She complains of laceration to her right thumb but otherwise denies any numbness tingling weakness, difficulty moving her thumb, foreign body sensation, recent illness or any other injuries. She mentions her tetanus is up-to-date.         PastMedical/Surgical History:      Diagnosis Date    Arthritis     Neck    Asthma     PCP - last episode: 2013    Diabetes mellitus ([facility] [address]     Type II - follows with PCP    Ehler's-Danlos syndrome Dx: 2000    PCP    Fibromyalgia     Hx of blood clots 2000    Left lower leg - unsure of treatment    Hypertension     Middle cerebral aneurysm 2002    surgery @ [facility] [address] Mitral valve regurgitation     Originally saw Dr. Raji Costello, now follows with PCP (updated 1/13/2021)    Raynauds syndrome     Short-term memory loss 2002    due to aneurysm per pt         Procedure Laterality Date    ANKLE SURGERY Right 2010    surgery x2    BICEPS TENDON REPAIR Left 1998    in left 5th digit    BRAIN SURGERY  2002    aneurysm repair   [facility] COLONOSCOPY  12/2020    Diverticulosis - Dr. Devyn Masterson, COLON, DIAGNOSTIC  11/2020    \"Took biopsy and was irregular\", has to repeat in Feb 2021   [address] Left 1990's    repair left elbow    SHOULDER ARTHROSCOPY Right 1/19/2021    RIGHT SHOULDER ARTHROSCOPY, SUBACROMIAL DECOMPRESSION DISTAL CLAVICLE RESECTION ROTATOR CUFF REPAIR DEBRIDEMENT performed by Mar Briceno DO at [facility]      vein stripping on right x2 / vein stripping on left x8       Medications:  Discharge Medication List as of 12/24/2021  6:39 PM      CONTINUE these medications which have NOT CHANGED    Details   medroxyPROGESTERone (DEPO-PROVERA) 150 MG/ML injection BRING TO OFFICE EVERY 3 MONTHS FOR INTRAMUSCULAR INJECTIONHistorical Med      oxybutynin (DITROPAN) 5 MG tablet TAKE 1 TABLET BY MOUTH TWO TIMES A DAYHistorical Med      fluticasone (FLONASE) 50 MCG/ACT nasal spray USE 2 SPRAYS IN EACH NOSTRIL ONCE DAILYHistorical Med      losartan-hydroCHLOROthiazide (HYZAAR) 100-25 MG per tablet TAKE 1 TABLET BY MOUTH IN THE MORNINGHistorical Med      doxycycline hyclate (VIBRA-TABS) 100 MG tablet TAKE 1 CAPSULE BY MOUTH TWO TIMES A DAY FOR 10 DAYSHistorical Med      doxepin (SINEQUAN) 50 MG capsule TAKE 1 CAPSULE BY MOUTH IN THE EVENINGHistorical Med      clotrimazole-betamethasone (LOTRISONE) 1-0.05 % cream APPLY 1-2 GRAMS TOPICALLY TO THE AFFECTED AREA EVERY 12 HOURS AS NEEDED FOR INFLAMMATION, Historical Med      famotidine (PEPCID) 20 MG tablet Historical Med      meloxicam (MOBIC) 15 MG tablet Historical Med      doxazosin (CARDURA) 4 MG tablet TAKE 1 TABLET BY MOUTH AT BEDTIMEHistorical Med      Rimegepant Sulfate (NURTEC) 75 MG TBDP Take by mouthHistorical Med      TRULICITY 1.5 BF/2.4TU SOPN INJECT 1.5MG UNDER THE SKIN  AS DIRECTED EVERY WEEK, DAWHistorical Med      methocarbamol (ROBAXIN) 750 MG tablet TAKE 1 TABLET BY MOUTH TWO TIMES A DAY AS NEEDEDHistorical Med      oxybutynin (DITROPAN-XL) 5 MG extended release tablet Take 5 mg by mouth 2 times dailyHistorical Med      losartan (COZAAR) 100 MG tablet Take 100 mg by mouth dailyHistorical Med      traZODone (DESYREL) 100 MG tablet Take 50 mg by mouth nightly as needed for Sleep Historical Med      ibuprofen (ADVIL;MOTRIN) 800 MG tablet Take 800 mg by mouth every 6 hours as needed for Pain Historical Med      glipiZIDE (GLUCOTROL) 10 MG tablet Take 10 mg by mouth dailyHistorical Med      tiZANidine (ZANAFLEX) 4 MG tablet Take 4 mg by mouth every 8 hours as needed Historical Med      rosuvastatin (CRESTOR) 20 MG tablet Take 20 mg by mouth dailyHistorical Med      naproxen (NAPROSYN) 500 MG tablet Take 1 tablet by mouth 2 times daily, Disp-60 tablet,R-0Print      acyclovir (ZOVIRAX) 400 MG tablet Historical Med      magnesium oxide (MAG-OX) 400 MG tablet Take 400 mg by mouth daily      potassium chloride SA (K-DUR;KLOR-CON M) 20 MEQ tablet Take 20 mEq by mouth 2 times daily Historical Med      vitamin D (MAXIMUM D3) 38014 UNITS CAPS capsule Take 10,000 Units by mouth daily (with breakfast) Historical Med      butalbital-acetaminophen-caffeine (FIORICET) per tablet Take 1 tablet by mouth every 4 hours as needed for Pain., Disp-180 tablet, R-0               Review of Systems:  (2-9 systems needed)  Review of Systems   Constitutional: Negative for chills and fever. HENT: Negative for congestion and rhinorrhea. Eyes: Negative for pain and visual disturbance. Respiratory: Negative for cough and shortness of breath. Cardiovascular: Negative for chest pain and leg swelling. Gastrointestinal: Negative for abdominal pain, diarrhea, nausea and vomiting. Genitourinary: Negative for dysuria and hematuria. Musculoskeletal: Negative for back pain and myalgias. Skin: Negative for rash and wound. Neurological: Negative for dizziness and light-headedness.        \"Positives and Pertinent negatives as per HPI\"    Physical Exam:  Physical Exam  Vitals and nursing note reviewed. Constitutional:       Appearance: Normal appearance. She is well-developed. She is not ill-appearing or diaphoretic. HENT:      Head: Normocephalic and atraumatic. Right Ear: External ear normal.      Left Ear: External ear normal.      Nose: Nose normal.   Eyes:      General:         Right eye: No discharge. Left eye: No discharge. Pulmonary:      Effort: Pulmonary effort is normal. No respiratory distress. Breath sounds: No stridor. Musculoskeletal:         General: Normal range of motion. Hands:       Cervical back: Normal range of motion and neck supple. Comments: 4cm over raidal aspecit of right thumb base, Superficial in appearance, no involvement of tendons or muscles. No evidence of any foreign body or debris. No active bleeding, two-point distinction, and sensation intact   Skin:     General: Skin is warm and dry. Coloration: Skin is not pale. Neurological:      General: No focal deficit present. Mental Status: She is alert and oriented to person, place, and time. Psychiatric:         Mood and Affect: Mood normal.         Behavior: Behavior normal.         MEDICAL DECISION MAKING    Vitals:    Vitals:    12/24/21 1727 12/24/21 1731   BP:  (!) 166/89   Pulse:  93   Resp:  17   Temp: 98.3 °F (36.8 °C) 98.3 °F (36.8 °C)   TempSrc: Oral Oral   SpO2:  97%   Weight:  205 lb (93 kg)   Height:  5' 4\" (1.626 m)       LABS:Labs Reviewed - No data to display     Remainder of labs reviewed and were negative at this time or not returned at the time of this note.     RADIOLOGY:   Non-plain film images such as CT, Ultrasound and MRI are read by the radiologist. Blas Theodore PA-C have directly visualized the radiologic plain film image(s) with the below findings:      Interpretation per the Radiologist below, if available at the time of this note:    XR FINGER RIGHT (MIN 2 VIEWS)   Final Result   Soft tissue wound subjacent to the base of the right 1st metacarpal.  No   radiopaque foreign body or underlying osseous injury is seen. XR FINGER RIGHT (MIN 2 VIEWS)    Result Date: 12/24/2021  EXAMINATION: THREE XRAY VIEWS OF THE RIGHT FINGERS 12/24/2021 6:13 pm COMPARISON: 06/07/2021 HISTORY: ORDERING SYSTEM PROVIDED HISTORY: thumb pain, laceration TECHNOLOGIST PROVIDED HISTORY: Reason for exam:->thumb pain, laceration Reason for Exam: thumb pain, laceration, near base of thumb and 1st metacarpal too FINDINGS: There is a soft tissue wound subjacent to the base of the right 1st metacarpal.  No underlying osseous abnormality is seen. No radiopaque foreign body is noted. Joint spaces are preserved. Soft tissue wound subjacent to the base of the right 1st metacarpal.  No radiopaque foreign body or underlying osseous injury is seen. MEDICAL DECISION MAKING / ED COURSE:      Patient presented with above HPI. Initial orders placed at triage. X-rays reviewed unremarkable. Was able to visualize the wound in a well lit bloodless field through full range of motion. No evidence of any foreign body or debris. No bony tenderness or deformities. Full ROM. No concern for any neurovascular deficits. Wound cleansed, irrigated copiously with clean tap water. Patient verbally consented for and tolerated primary closure well without complications. Her tetanus is up-to-date. Follow-up plan return precautions provide discussed in detail patient in agreement. The patient tolerated their visit well. I evaluated the patient. The physician was available for consultation as needed.   The patient and / or the family were informed of the results of any tests, a time was given to answer questions, a plan was proposed and they agreed with plan.        ________________________________________________________________________       Procedure Note - Lizandro Yadav PA-C, PA-C      Laceration Repair Procedure Note    Indication: Skin Laceration - right thumb  Procedure:   - Procedure explained, including risks and benefits explained to the patient who expressed understanding. All questions were answered. Verbal consent obtained. - The Wound was prepped and draped in the usual sterile fashion using Betadine and sterile saline.  - The wound is anesthetized using 4cc 1% lidocaine with epi and 0.5 bupivicaine   - Wound was explored to it's depth:    no foreign bodies. no compromise of neurovascular or tendon structures  - Wound was irrigated with copious amounts of sterile saline and mechanically debrided utilizing sterile gauze. - The laceration was Closed with 4-0 nylon sutures, total number of 5,  simple interrupted  - Hemostasis and good cosmesis was achieved. Blood loss minimal.  - The wound area was then dressed with Sterile nonstick dressing, sterile gauze, and tape. - Patient tolerated procedure well without complications. Post procedure exam of the affected region reveals distal sensation, motor, capillary refill, and pulses intact    Total repaired wound length: 4cm  ________________________________________________________________________          ED COURSE & MEDICAL DECISION MAKING        I discussed possibility of infection, retained foreign body, tendon injury, nerve injury. Wound care instructions discussed with patient today. Wound check in 2-3 days. Suture/Staple removal in 7-10days. (discussed today). Return to emergency Department precautions were discussed in detail with patient who understands and agrees. Vital signs and nursing notes reviewed during ED course. All pertinent Lab data and radiographic results reviewed with patient at bedside. The patient and/or the family were informed of the results of any tests/labs/imaging, the treatment plan, and time was allotted to answer questions. Clinical  IMPRESSION    1.  Laceration of right thumb without foreign body without damage to nail, initial encounter              Comment: Please note this report has been produced using speech recognition software and may contain errors related to that system including errors in grammar, punctuation, and spelling, as well as words and phrases that may be inappropriate. If there are any questions or concerns please feel free to contact the dictating provider for clarification. ________________________________________________________________________             CLINICAL IMPRESSION:  1.  Laceration of right thumb without foreign body without damage to nail, initial encounter        DISPOSITION Decision To Discharge 12/24/2021 06:38:04 PM           (Please note the MDM and HPI sections of this note were completed with a voice recognition program.  Efforts were made to edit the dictations but occasionally words are mis-transcribed.)    Electronically signed, Emily Porras PA-C,           Emily Porras PA-C  12/25/21 4531

## 2021-12-24 NOTE — ED PROVIDER NOTES
As providerin-triage, I performed a medical screening history and physical exam on this patient. HISTORY OF PRESENT ILLNESS  Bettie Blake is a 46 y.o. female who presents to the emergency department today with a laceration to the right thumb. .      PHYSICAL EXAM  BP (!) 166/89   Pulse 93   Temp 98.3 °F (36.8 °C) (Oral)   Resp 17   Ht 5' 4\" (1.626 m)   Wt 205 lb (93 kg)   SpO2 97%   BMI 35.19 kg/m²     On exam, the patient appears in no acute distress. Speech is clear. Breathing is unlabored. Moves all extremities    Comment: Please note this report has been produced using speech recognition software and may contain errors related to that system including errors in grammar, punctuation, and spelling, as well as words and phrases that may be inappropriate. If there are any questions or concerns please feel free to contact the dictating provider for clarification.         Tanya Delatorre 411, PA  12/24/21 1234

## 2021-12-24 NOTE — ED NOTES
Discharge instructions reviewed with patient. Medications and follow up discussed. Patient denies questions and verbalizes understanding.      Brdigette Dunn RN  12/24/21 6252

## 2021-12-25 ASSESSMENT — ENCOUNTER SYMPTOMS
DIARRHEA: 0
NAUSEA: 0
BACK PAIN: 0
EYE PAIN: 0
SHORTNESS OF BREATH: 0
VOMITING: 0
ABDOMINAL PAIN: 0
RHINORRHEA: 0
COUGH: 0

## 2021-12-31 ENCOUNTER — APPOINTMENT (OUTPATIENT)
Dept: GENERAL RADIOLOGY | Age: 51
End: 2021-12-31
Payer: COMMERCIAL

## 2021-12-31 ENCOUNTER — APPOINTMENT (OUTPATIENT)
Dept: CT IMAGING | Age: 51
End: 2021-12-31
Payer: COMMERCIAL

## 2021-12-31 ENCOUNTER — HOSPITAL ENCOUNTER (EMERGENCY)
Age: 51
Discharge: ANOTHER HOSPITAL | End: 2021-12-31
Attending: EMERGENCY MEDICINE
Payer: COMMERCIAL

## 2021-12-31 VITALS
OXYGEN SATURATION: 94 % | HEART RATE: 97 BPM | RESPIRATION RATE: 17 BRPM | WEIGHT: 205 LBS | SYSTOLIC BLOOD PRESSURE: 122 MMHG | DIASTOLIC BLOOD PRESSURE: 85 MMHG | HEIGHT: 64 IN | BODY MASS INDEX: 35 KG/M2

## 2021-12-31 DIAGNOSIS — R51.9 ACUTE NONINTRACTABLE HEADACHE, UNSPECIFIED HEADACHE TYPE: ICD-10-CM

## 2021-12-31 DIAGNOSIS — R73.9 HYPERGLYCEMIA: ICD-10-CM

## 2021-12-31 DIAGNOSIS — I10 ESSENTIAL HYPERTENSION: ICD-10-CM

## 2021-12-31 DIAGNOSIS — I77.74 VERTEBRAL ARTERY DISSECTION (HCC): Primary | ICD-10-CM

## 2021-12-31 LAB
ALBUMIN SERPL-MCNC: 3.9 GM/DL (ref 3.4–5)
ALP BLD-CCNC: 160 IU/L (ref 40–129)
ALT SERPL-CCNC: 25 U/L (ref 10–40)
ANION GAP SERPL CALCULATED.3IONS-SCNC: 14 MMOL/L (ref 4–16)
AST SERPL-CCNC: 16 IU/L (ref 15–37)
BASE EXCESS MIXED: 1.4 (ref 0–2.3)
BASOPHILS ABSOLUTE: 0.1 K/CU MM
BASOPHILS RELATIVE PERCENT: 0.8 % (ref 0–1)
BETA-HYDROXYBUTYRATE: 2.8 MG/DL (ref 0–3)
BILIRUB SERPL-MCNC: 0.6 MG/DL (ref 0–1)
BUN BLDV-MCNC: 17 MG/DL (ref 6–23)
CALCIUM SERPL-MCNC: 9.8 MG/DL (ref 8.3–10.6)
CHLORIDE BLD-SCNC: 87 MMOL/L (ref 99–110)
CHP ED QC CHECK: YES
CO2: 23 MMOL/L (ref 21–32)
COMMENT: ABNORMAL
CREAT SERPL-MCNC: 0.9 MG/DL (ref 0.6–1.1)
DIFFERENTIAL TYPE: ABNORMAL
EOSINOPHILS ABSOLUTE: 0.3 K/CU MM
EOSINOPHILS RELATIVE PERCENT: 3.1 % (ref 0–3)
GFR AFRICAN AMERICAN: >60 ML/MIN/1.73M2
GFR NON-AFRICAN AMERICAN: >60 ML/MIN/1.73M2
GLUCOSE BLD-MCNC: 235 MG/DL
GLUCOSE BLD-MCNC: 235 MG/DL (ref 70–99)
GLUCOSE BLD-MCNC: 669 MG/DL (ref 70–99)
GLUCOSE BLD-MCNC: >600 MG/DL (ref 70–99)
HCO3 VENOUS: 28.1 MMOL/L (ref 19–25)
HCT VFR BLD CALC: 46.1 % (ref 37–47)
HEMOGLOBIN: 15 GM/DL (ref 12.5–16)
IMMATURE NEUTROPHIL %: 0.2 % (ref 0–0.43)
INR BLD: 0.78 INDEX
LYMPHOCYTES ABSOLUTE: 4.3 K/CU MM
LYMPHOCYTES RELATIVE PERCENT: 43 % (ref 24–44)
MCH RBC QN AUTO: 28.5 PG (ref 27–31)
MCHC RBC AUTO-ENTMCNC: 32.5 % (ref 32–36)
MCV RBC AUTO: 87.5 FL (ref 78–100)
MONOCYTES ABSOLUTE: 0.9 K/CU MM
MONOCYTES RELATIVE PERCENT: 9.4 % (ref 0–4)
NUCLEATED RBC %: 0 %
O2 SAT, VEN: 82.7 % (ref 50–70)
PCO2, VEN: 52 MMHG (ref 38–52)
PDW BLD-RTO: 13 % (ref 11.7–14.9)
PH VENOUS: 7.34 (ref 7.32–7.42)
PLATELET # BLD: 356 K/CU MM (ref 140–440)
PMV BLD AUTO: 9.9 FL (ref 7.5–11.1)
PO2, VEN: 53 MMHG (ref 28–48)
POTASSIUM SERPL-SCNC: 3.7 MMOL/L (ref 3.5–5.1)
PROTHROMBIN TIME: 10 SECONDS (ref 11.7–14.5)
RBC # BLD: 5.27 M/CU MM (ref 4.2–5.4)
SEGMENTED NEUTROPHILS ABSOLUTE COUNT: 4.3 K/CU MM
SEGMENTED NEUTROPHILS RELATIVE PERCENT: 43.5 % (ref 36–66)
SODIUM BLD-SCNC: 124 MMOL/L (ref 135–145)
TOTAL IMMATURE NEUTOROPHIL: 0.02 K/CU MM
TOTAL NUCLEATED RBC: 0 K/CU MM
TOTAL PROTEIN: 7.6 GM/DL (ref 6.4–8.2)
TROPONIN T: <0.01 NG/ML
WBC # BLD: 10 K/CU MM (ref 4–10.5)

## 2021-12-31 PROCEDURE — 82010 KETONE BODYS QUAN: CPT

## 2021-12-31 PROCEDURE — 70498 CT ANGIOGRAPHY NECK: CPT

## 2021-12-31 PROCEDURE — 6370000000 HC RX 637 (ALT 250 FOR IP): Performed by: PHYSICIAN ASSISTANT

## 2021-12-31 PROCEDURE — 96375 TX/PRO/DX INJ NEW DRUG ADDON: CPT

## 2021-12-31 PROCEDURE — 96374 THER/PROPH/DIAG INJ IV PUSH: CPT

## 2021-12-31 PROCEDURE — 80053 COMPREHEN METABOLIC PANEL: CPT

## 2021-12-31 PROCEDURE — 96372 THER/PROPH/DIAG INJ SC/IM: CPT

## 2021-12-31 PROCEDURE — 82805 BLOOD GASES W/O2 SATURATION: CPT

## 2021-12-31 PROCEDURE — 84484 ASSAY OF TROPONIN QUANT: CPT

## 2021-12-31 PROCEDURE — 82962 GLUCOSE BLOOD TEST: CPT

## 2021-12-31 PROCEDURE — 71045 X-RAY EXAM CHEST 1 VIEW: CPT

## 2021-12-31 PROCEDURE — 6360000002 HC RX W HCPCS: Performed by: PHYSICIAN ASSISTANT

## 2021-12-31 PROCEDURE — 2580000003 HC RX 258: Performed by: PHYSICIAN ASSISTANT

## 2021-12-31 PROCEDURE — 6360000004 HC RX CONTRAST MEDICATION: Performed by: PHYSICIAN ASSISTANT

## 2021-12-31 PROCEDURE — 85610 PROTHROMBIN TIME: CPT

## 2021-12-31 PROCEDURE — 93005 ELECTROCARDIOGRAM TRACING: CPT | Performed by: PHYSICIAN ASSISTANT

## 2021-12-31 PROCEDURE — 99284 EMERGENCY DEPT VISIT MOD MDM: CPT

## 2021-12-31 PROCEDURE — 70450 CT HEAD/BRAIN W/O DYE: CPT

## 2021-12-31 PROCEDURE — 85025 COMPLETE CBC W/AUTO DIFF WBC: CPT

## 2021-12-31 PROCEDURE — 36415 COLL VENOUS BLD VENIPUNCTURE: CPT

## 2021-12-31 RX ORDER — ONDANSETRON 2 MG/ML
4 INJECTION INTRAMUSCULAR; INTRAVENOUS EVERY 30 MIN PRN
Status: DISCONTINUED | OUTPATIENT
Start: 2021-12-31 | End: 2021-12-31 | Stop reason: HOSPADM

## 2021-12-31 RX ORDER — ASPIRIN 325 MG
325 TABLET ORAL ONCE
Status: COMPLETED | OUTPATIENT
Start: 2021-12-31 | End: 2021-12-31

## 2021-12-31 RX ORDER — MORPHINE SULFATE 2 MG/ML
4 INJECTION, SOLUTION INTRAMUSCULAR; INTRAVENOUS EVERY 30 MIN PRN
Status: DISCONTINUED | OUTPATIENT
Start: 2021-12-31 | End: 2021-12-31 | Stop reason: HOSPADM

## 2021-12-31 RX ORDER — 0.9 % SODIUM CHLORIDE 0.9 %
1000 INTRAVENOUS SOLUTION INTRAVENOUS ONCE
Status: COMPLETED | OUTPATIENT
Start: 2021-12-31 | End: 2021-12-31

## 2021-12-31 RX ORDER — NITROGLYCERIN 0.4 MG/1
0.4 TABLET SUBLINGUAL ONCE
Status: DISCONTINUED | OUTPATIENT
Start: 2021-12-31 | End: 2021-12-31

## 2021-12-31 RX ORDER — SODIUM CHLORIDE 0.9 % (FLUSH) 0.9 %
10 SYRINGE (ML) INJECTION
Status: COMPLETED | OUTPATIENT
Start: 2021-12-31 | End: 2021-12-31

## 2021-12-31 RX ADMIN — MORPHINE SULFATE 4 MG: 2 INJECTION, SOLUTION INTRAMUSCULAR; INTRAVENOUS at 12:24

## 2021-12-31 RX ADMIN — ONDANSETRON 4 MG: 2 INJECTION INTRAMUSCULAR; INTRAVENOUS at 12:23

## 2021-12-31 RX ADMIN — SODIUM CHLORIDE, PRESERVATIVE FREE 10 ML: 5 INJECTION INTRAVENOUS at 12:19

## 2021-12-31 RX ADMIN — SODIUM CHLORIDE 1000 ML: 9 INJECTION, SOLUTION INTRAVENOUS at 12:29

## 2021-12-31 RX ADMIN — IOPAMIDOL 75 ML: 755 INJECTION, SOLUTION INTRAVENOUS at 12:16

## 2021-12-31 RX ADMIN — ASPIRIN 325 MG ORAL TABLET 325 MG: 325 PILL ORAL at 13:59

## 2021-12-31 ASSESSMENT — PAIN DESCRIPTION - PAIN TYPE: TYPE: ACUTE PAIN

## 2021-12-31 ASSESSMENT — PAIN DESCRIPTION - LOCATION: LOCATION: FACE

## 2021-12-31 ASSESSMENT — PAIN SCALES - GENERAL
PAINLEVEL_OUTOF10: 9
PAINLEVEL_OUTOF10: 9

## 2021-12-31 ASSESSMENT — PAIN DESCRIPTION - ORIENTATION: ORIENTATION: RIGHT

## 2021-12-31 NOTE — ED PROVIDER NOTES
Patient Identification  Letty Roger is a 46 y.o. female    Chief Complaint  Numbness (c/o numbness and tingling to right side of face, also c/o pain to right side of face. pt states tingling started last night. pt has hx of brain aneurysm in the past.)      HPI  (History provided by patient)  This is a 46 y.o. female who was brought in by self for chief complaint of numbness. Onset was 6 PM last night. Patient reports that she began developing a right temporal headache and numbness on the right side of her face. Initially was more tingling but has become more numb and painful as time is passed. She notes a history of brain aneurysm with clipping in the past.  No numbness or weakness of the extremities. No slurred speech. No changes in vision. REVIEW OF SYSTEMS    10 systems reviewed and negative except as noted above. See HPI and nursing notes for additional information     I have reviewed the following nursing documentation:  Allergies: Allergies   Allergen Reactions    Nubain [Nalbuphine Hcl]      Seizures      Secobarbital Sodium Anaphylaxis     SEIZURE    Sulfa Antibiotics Anaphylaxis    Adhesive Tape Rash    Demerol      seizures    Indocin [Indomethacin]        Past medical history:  has a past medical history of Arthritis, Asthma, Diabetes mellitus ([facility] [address] Ehler's-Danlos syndrome (Dx: ), Fibromyalgia, blood clots (), Hypertension, Middle cerebral aneurysm (), Mitral valve regurgitation, Raynauds syndrome, and Short-term memory loss (). Past surgical history:  has a past surgical history that includes brain surgery (); Vein Surgery;  section (); Tubal ligation (); Nerve Surgery (Left, ); Biceps tendon repair (Left, ); Ankle surgery (Right, ); Colonoscopy (2020); Endoscopy, colon, diagnostic (2020); Tonsillectomy (); and Shoulder arthroscopy (Right, 2021).     Home medications:   Prior to Admission medications Medication Sig Start Date End Date Taking?  Authorizing Provider   medroxyPROGESTERone (DEPO-PROVERA) 150 MG/ML injection BRING TO OFFICE EVERY 3 MONTHS FOR INTRAMUSCULAR INJECTION 9/10/21   Historical Provider, MD   oxybutynin (DITROPAN) 5 MG tablet TAKE 1 TABLET BY MOUTH TWO TIMES A DAY 7/24/21   Historical Provider, MD   fluticasone (FLONASE) 50 MCG/ACT nasal spray USE 2 SPRAYS IN EACH NOSTRIL ONCE DAILY 9/2/21   Historical Provider, MD   losartan-hydroCHLOROthiazide (HYZAAR) 100-25 MG per tablet TAKE 1 TABLET BY MOUTH IN THE MORNING 8/9/21   Historical Provider, MD   doxycycline hyclate (VIBRA-TABS) 100 MG tablet TAKE 1 CAPSULE BY MOUTH TWO TIMES A DAY FOR 10 DAYS 7/9/21   Historical Provider, MD   doxepin (SINEQUAN) 50 MG capsule TAKE 1 CAPSULE BY MOUTH IN THE EVENING 9/8/21   Historical Provider, MD   clotrimazole-betamethasone (LOTRISONE) 1-0.05 % cream APPLY 1-2 GRAMS TOPICALLY TO THE AFFECTED AREA EVERY 12 HOURS AS NEEDED FOR INFLAMMATION 9/7/21   Historical Provider, MD   famotidine (PEPCID) 20 MG tablet  9/12/21   Historical Provider, MD   meloxicam (MOBIC) 15 MG tablet  9/12/21   Historical Provider, MD   doxazosin (CARDURA) 4 MG tablet TAKE 1 TABLET BY MOUTH AT BEDTIME 8/13/21   Historical Provider, MD   Rimegepant Sulfate (NURTEC) 75 MG TBDP Take by mouth    Historical Provider, MD   TRULICITY 1.5 FV/6.5JH SOPN INJECT 1.5MG UNDER THE SKIN  AS DIRECTED EVERY WEEK 4/28/21   Historical Provider, MD   methocarbamol (ROBAXIN) 750 MG tablet TAKE 1 TABLET BY MOUTH TWO TIMES A DAY AS NEEDED 4/23/21   Historical Provider, MD   oxybutynin (DITROPAN-XL) 5 MG extended release tablet Take 5 mg by mouth 2 times daily    Historical Provider, MD   losartan (COZAAR) 100 MG tablet Take 100 mg by mouth daily    Historical Provider, MD   traZODone (DESYREL) 100 MG tablet Take 50 mg by mouth nightly as needed for Sleep     Historical Provider, MD   ibuprofen (ADVIL;MOTRIN) 800 MG tablet Take 800 mg by mouth every 6 hours as needed for Pain     Historical Provider, MD   glipiZIDE (GLUCOTROL) 10 MG tablet Take 10 mg by mouth daily    Historical Provider, MD   tiZANidine (ZANAFLEX) 4 MG tablet Take 4 mg by mouth every 8 hours as needed     Historical Provider, MD   rosuvastatin (CRESTOR) 20 MG tablet Take 20 mg by mouth daily    Historical Provider, MD   naproxen (NAPROSYN) 500 MG tablet Take 1 tablet by mouth 2 times daily 10/15/20   Bianca Clifton MD   acyclovir (ZOVIRAX) 400 MG tablet  9/23/16   Historical Provider, MD   magnesium oxide (MAG-OX) 400 MG tablet Take 400 mg by mouth daily    Historical Provider, MD   potassium chloride SA (K-DUR;KLOR-CON M) 20 MEQ tablet Take 20 mEq by mouth 2 times daily     Historical Provider, MD   vitamin D (MAXIMUM D3) 93507 UNITS CAPS capsule Take 10,000 Units by mouth daily (with breakfast)     Historical Provider, MD   butalbital-acetaminophen-caffeine (FIORICET) per tablet Take 1 tablet by mouth every 4 hours as needed for Pain. 5/26/13   Martejennifer Shrestha MD       Social history:  reports that she has never smoked. She has never used smokeless tobacco. She reports current alcohol use. She reports that she does not use drugs. Family history:    Family History   Problem Relation Age of Onset    Diabetes Mother     Diabetes Father     Heart Disease Father      Exam  /85   Pulse 97   Resp 17   Ht 5' 4\" (1.626 m)   Wt 205 lb (93 kg)   SpO2 94%   BMI 35.19 kg/m²   Nursing note and vitals reviewed. Constitutional: Well developed, well nourished. No acute distress. HENT:      Head: Normocephalic and atraumatic. Ears: External ears normal.      Nose: Nose normal.     Mouth: Membrane mucosa moist and pink. No posterior oropharynx erythema or tonsillar edema  Eyes: Anicteric sclera. No discharge, PERRL  Neck: Supple. Trachea midline. Cardiovascular: RRR, no murmurs, rubs, or gallops, radial pulses 2+ bilaterally. Pulmonary/Chest: Effort normal. No respiratory distress. CTAB. No stridor. No wheezes. No rales. Abdominal: Soft. Nontender to palpation. No distension. No guarding, rebound tenderness, or evidence of ascites. : No CVA tenderness. Musculoskeletal: Moves all extremities. No gross deformity. Neurological: Alert and oriented to person, place, and time. Normal muscle tone. No facial droop. No tongue deviation. Pupils equal round reactive to light. Bilateral upper and lower extremity strength and sensation intact. NIH Stroke Scale  Interval: Baseline  Level of Consciousness (1a. ): Alert  LOC Questions (1b. ): Answers both correctly  LOC Commands (1c. ): Performs both tasks correctly  Best Gaze (2. ): Normal  Visual (3. ): No visual loss  Facial Palsy (4. ): Normal symmetrical movement  Motor Arm, Left (5a. ): No drift  Motor Arm, Right (5b. ): No drift  Motor Leg, Left (6a. ): No drift  Motor Leg, Right (6b. ): No drift  Limb Ataxia (7. ): Absent  Sensory (8. ): Normal  Best Language (9. ): No aphasia  Dysarthria (10. ): Normal  Extinction and Inattention (11): No abnormality  Total: 0     Skin: Warm and dry. No rash. Psychiatric: Normal mood and affect. Behavior is normal.    Procedures      EKG   Please see Dr. Katherine Lynch note for EKG read. Radiographs (if obtained):  [] The following radiograph was interpreted by myself in the absence of a radiologist:   [x] Radiologist's Report Reviewed:  XR CHEST PORTABLE   Final Result   Stable chest without acute cardiopulmonary process. CTA HEAD NECK W CONTRAST   Final Result   CTA OF THE HEAD:      1. Stable aneurysm clip along the left mca bifurcation. 2. Attenuated flow in the V4 segment of the right vertebral artery related to   proximal dissection. 3. The remainder of the CTA of the head is unremarkable. CTA OF THE NECK:      1. Occlusion of the V1 segment of the right vertebral artery with attenuated   flow distally, likely indicating dissection.    2. No significant stenosis in the bilateral cervical carotid or left   vertebral arteries. RECOMMENDATIONS:   Unavailable         CT HEAD WO CONTRAST   Final Result   No acute intracranial abnormality. Prior left MCA aneurysm clipping. This report was discussed with Dr. Vic Stevenson at 12:09 p.m. on 12/31/2021.                 Labs  Results for orders placed or performed during the hospital encounter of 12/31/21   CBC Auto Differential   Result Value Ref Range    WBC 10.0 4.0 - 10.5 K/CU MM    RBC 5.27 4.2 - 5.4 M/CU MM    Hemoglobin 15.0 12.5 - 16.0 GM/DL    Hematocrit 46.1 37 - 47 %    MCV 87.5 78 - 100 FL    MCH 28.5 27 - 31 PG    MCHC 32.5 32.0 - 36.0 %    RDW 13.0 11.7 - 14.9 %    Platelets 538 629 - 522 K/CU MM    MPV 9.9 7.5 - 11.1 FL    Differential Type AUTOMATED DIFFERENTIAL     Segs Relative 43.5 36 - 66 %    Lymphocytes % 43.0 24 - 44 %    Monocytes % 9.4 (H) 0 - 4 %    Eosinophils % 3.1 (H) 0 - 3 %    Basophils % 0.8 0 - 1 %    Segs Absolute 4.3 K/CU MM    Lymphocytes Absolute 4.3 K/CU MM    Monocytes Absolute 0.9 K/CU MM    Eosinophils Absolute 0.3 K/CU MM    Basophils Absolute 0.1 K/CU MM    Nucleated RBC % 0.0 %    Total Nucleated RBC 0.0 K/CU MM    Total Immature Neutrophil 0.02 K/CU MM    Immature Neutrophil % 0.2 0 - 0.43 %   Comprehensive Metabolic Panel w/ Reflex to MG   Result Value Ref Range    Sodium 124 (L) 135 - 145 MMOL/L    Potassium 3.7 3.5 - 5.1 MMOL/L    Chloride 87 (L) 99 - 110 mMol/L    CO2 23 21 - 32 MMOL/L    BUN 17 6 - 23 MG/DL    CREATININE 0.9 0.6 - 1.1 MG/DL    Glucose 669 (HH) 70 - 99 MG/DL    Calcium 9.8 8.3 - 10.6 MG/DL    Albumin 3.9 3.4 - 5.0 GM/DL    Total Protein 7.6 6.4 - 8.2 GM/DL    Total Bilirubin 0.6 0.0 - 1.0 MG/DL    ALT 25 10 - 40 U/L    AST 16 15 - 37 IU/L    Alkaline Phosphatase 160 (H) 40 - 129 IU/L    GFR Non-African American >60 >60 mL/min/1.73m2    GFR African American >60 >60 mL/min/1.73m2    Anion Gap 14 4 - 16   Troponin   Result Value Ref Range    Troponin T <0.010 <0.01 NG/ML   Protime-INR Result Value Ref Range    Protime 10.0 (L) 11.7 - 14.5 SECONDS    INR 0.78 INDEX   Beta-Hydroxybutyrate   Result Value Ref Range    Beta-Hydroxybutyrate 2.8 0.0 - 3.0 MG/DL   Blood Gas, Venous   Result Value Ref Range    pH, Andres 7.34 7.32 - 7.42    pCO2, Andres 52 38 - 52 mmHG    pO2, Andres 53 (H) 28 - 48 mmHG    Base Exc, Mixed 1.4 0 - 2.3    HCO3, Venous 28.1 (H) 19 - 25 MMOL/L    O2 Sat, Andres 82.7 (H) 50 - 70 %    Comment VBG    POCT Glucose   Result Value Ref Range    Glucose 235 mg/dL    QC OK? yes    POCT Glucose   Result Value Ref Range    POC Glucose 235 (H) 70 - 99 MG/DL   EKG 12 Lead   Result Value Ref Range    Ventricular Rate 87 BPM    Atrial Rate 87 BPM    P-R Interval 148 ms    QRS Duration 94 ms    Q-T Interval 386 ms    QTc Calculation (Bazett) 464 ms    P Axis 49 degrees    R Axis 9 degrees    T Axis 24 degrees    Diagnosis       Normal sinus rhythm  Minimal voltage criteria for LVH, may be normal variant  Borderline ECG  When compared with ECG of 10-AUG-2020 00:16,  Nonspecific T wave abnormality, improved in Inferior leads  Nonspecific T wave abnormality no longer evident in Anterolateral leads           MDM  Patient presents for right-sided facial numbness and right-sided headache. Has history of aneurysms. Work-up here reveals an initial negative head CT. Patient is not a TPA candidate because she is out of TPA window and because of her hypertension and hyperglycemia. Patient's glucose is extremely elevated. Her blood pressure also was significantly elevated initially, unclear if this is due to pain, given morphine and Zofran and IV fluids and subcutaneous insulin. CTA results later obtained which show a dissection of the right vertebral artery at 2 separate points. I did speak again with stroke neurologist regarding patient's right vertebral artery dissection and she recommended transfer to [facility] for neurovascular interventionist eval, recommended aspirin which was given to patient. Specifically recommended against increased blood pressure control. .  Patient accepted after delay receiving bed assignment and plan is to transfer by critical care ground. Discussed all the above with patient who is in agreement. This patient was also seen and evaluated by Dr. Hattie Pepper    Final Impression  1. Vertebral artery dissection ([facility] [address]    2. Acute nonintractable headache, unspecified headache type    3. Essential hypertension    4. Hyperglycemia        Blood pressure 122/85, pulse 97, resp. rate 17, height 5' 4\" (1.626 m), weight 205 lb (93 kg), SpO2 94 %. Disposition:  Transfer to [facility] to [facility] in stable condition. Patient was given scripts for the following medications. I counseled patient how to take these medications. New Prescriptions    No medications on file       This chart was generated using the [address] [place] [address] dictation system. I created this record but it may contain dictation errors given the limitations of this technology.        [address] [facility]  12/31/21 6497
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
## 2021-12-31 NOTE — ED NOTES
Bed: 01TR-01  Expected date:   Expected time:   Means of arrival:   Comments:  Triage stroke alert     Salvador Saldana RN  12/31/21 6023

## 2022-01-03 LAB
EKG ATRIAL RATE: 87 BPM
EKG DIAGNOSIS: NORMAL
EKG P AXIS: 49 DEGREES
EKG P-R INTERVAL: 148 MS
EKG Q-T INTERVAL: 386 MS
EKG QRS DURATION: 94 MS
EKG QTC CALCULATION (BAZETT): 464 MS
EKG R AXIS: 9 DEGREES
EKG T AXIS: 24 DEGREES
EKG VENTRICULAR RATE: 87 BPM

## 2022-01-03 PROCEDURE — 93010 ELECTROCARDIOGRAM REPORT: CPT | Performed by: INTERNAL MEDICINE

## 2022-01-12 ENCOUNTER — HOSPITAL ENCOUNTER (OUTPATIENT)
Dept: PHYSICAL THERAPY | Age: 52
Setting detail: THERAPIES SERIES
Discharge: HOME | End: 2022-01-12
Payer: COMMERCIAL

## 2022-01-12 PROCEDURE — 97140 MANUAL THERAPY 1/> REGIONS: CPT

## 2022-01-12 PROCEDURE — 97162 PT EVAL MOD COMPLEX 30 MIN: CPT

## 2022-01-12 ASSESSMENT — PAIN SCALES - GENERAL: PAINLEVEL_OUTOF10: 6

## 2022-01-12 ASSESSMENT — PAIN DESCRIPTION - FREQUENCY: FREQUENCY: CONTINUOUS

## 2022-01-12 NOTE — PLAN OF CARE
Outpatient Physical Therapy                  [x] Phone: [phone]   Fax: [phone]    Pediatric Therapy                                    [] Phone: [phone]   Fax: [phone]  Pediatric [facility]le mercer                                      [] Phone: [phone]   Fax: [phone]      To: Referring Practitioner: Dr. Tobias Hutton    From: Dyan Armenta PT, PT     Patient: Karuna Monk       : 1970  Diagnosis: Chronic pain of B shoulders   Treatment Diagnosis: chronic B shoulder upper back pains   Date: 2022    Physical Therapy Certification/Re-Certification Form  Dear Dr. Tobias Hutton  The following patient has been evaluated for physical therapy services and for therapy to continue, Please review the attached evaluation and/or summary of the patient's plan of care, and verify that you agree therapy should continue by signing the attached document and sending it back to our office. Patient is a  47 yo female who presents with B shoulder upper back pain which impacts on adls;patient's goal is todecrease pain ;patient reports that pain  limits activities including working, reaching, lifting, carrying; PT to address patient's goals, impairments and activity limitations with skilled interventions checked in plan of care;patient's level of function prior to onset of pain was independent; did not observe any barriers to learning during PT eval; learning preferences include demonstration, practice, and handouts; patient expressed understanding of HEP; patient appears to be motivated to participate in an active PT program and to be compliant with HEP expectations;patient assisted in developing treatment plan and goals; no DME is currently being used;      Current functional level (based on DASH)   :32 raw score    Assessment:  Assessment: Pt presents wt complaints of B shoulder, upper back pains. Pt is s/p R shoulder arthroscopy rotator cuff repair 21.   Recent dx of vertebral artery dissection and limited to 10#.  other pmHx: Katy-Danlos syndrome. She has tenderness, loss of motion, pain with movement at B shoulders. + signs of impingement B, tenderness throughout upper back/scap mms. Complains of catching pains mid range flexion, scaption, abduction. Plan of Care/Treatment to date:  [x] Therapeutic Exercise    [] Aquatics:  [x] Therapeutic Activity    [] Ultrasound  [x] Elec Stimulation  [] Gait Training     [] Cervical Traction [] Lumbar Traction  [x] Neuromuscular Re-education [] Cold/hotpack [] Iontophoresis   [x] Instruction in HEP       [x] Manual Therapy     [] vasopneumatic            [] Self care home management        []Dry needling trigger point point/pain management          Frequency/Duration:  # Days per week: [] 1 day # Weeks: [] 1 week [x] 5 weeks     [x] 2 days   [] 2 weeks [] 6 weeks     [] 3 days   [] 3 weeks [] 7 weeks     [] 4 days   [] 4 weeks [] 8 weeks    Rehab Potential/Progress: [] Excellent [x] Good [] Fair  [] Poor     Goals:       Long term goals  Time Frame for Long term goals : 5 weeks  Long term goal 1: I in home program.  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 Long term goal 5: minimal tenderness to B shoulder/upper back mms. Electronically signed by:  Constantin Chen PT, 1/12/2022, 5:51 PM              If you have any questions or concerns, please don't hesitate to call.   Thank you for your referral.      Physician Signature:_________________Date:____________Time: ________  By signing above, therapists plan is approved by physician

## 2022-01-12 NOTE — PROGRESS NOTES
Physical Therapy  Initial Assessment  Date: 2022  Patient Name: Codie Banda  MRN: [id number]  : 1970     Treatment Diagnosis: chronic B shoulder upper back pains    Restrictions   10# lifitng    Subjective   General  Additional Pertinent Hx: B shoulder pain, history of R shoulder arthroscopy. Recent dx of vertebral artery dissection on R.  limited to 10# was hospitalized. History of aneurysm. R worse than L shoulder pain. L hand dominant. Referring Practitioner: Dr. Marvin Quevedo  Referral Date : 21  Diagnosis: Chronic pain of B shoulders  PT Visit Information  PT Insurance Information: [facility]. Subjective  Subjective: B shoulder pain, chronic pain, neck pain and HA's. Pain Assessment  Pain Assessment: 0-10  Pain Level: 6  Pain Frequency: Continuous        Orientation   wfls    Social/Functional History  Social/Functional History  Lives With: Family  Type of Home: House  ADL Assistance: Independent  Homemaking Assistance: Independent  Homemaking Responsibilities: Yes  Ambulation Assistance: Independent  Transfer Assistance: Independent  Active : Yes  Occupation: Self employed  Type of occupation: Cleaning    Objective     Observation/Palpation  Posture: Fair  Palpation: tenderness at biceps groove, upper trap, scalenes, levator, rhomboids, pec minor. supra, infraspinatus and teres mn all tender B R>L. Observation: rounded shoulder posture w some elevation and FHP    PROM RUE (degrees)  RUE General PROM: 55ER, 45IR. 135flexion,  AROM RUE (degrees)  RUE General AROM: flex 130, 130abd  PROM LUE (degrees)  LUE General PROM: ER 80, IR 70.  145 flexion. AROM LUE (degrees)  LUE General AROM: 140flex, 120 abd  Spine  Cervical: 40ext, 45 flex, 50 B rot. Strength RUE  Comment: not fully tested due to weight restriction, but at least 4/5ER     Additional Measures  Special Tests: + B shoulder impingement. neg apprehension. Pain at end ranges B shoulders.                      Assessment Conditions Requiring Skilled Therapeutic Intervention  Assessment: Pt presents [facility] complaints of B shoulder, upper back pains. Pt is s/p R shoulder arthroscopy rotator cuff repair 1/19/21. Recent dx of vertebral artery dissection and limited to 10#.  other pmHx: Katy-Danlos syndrome. She has tenderness, loss of motion, pain with movement at B shoulders. + signs of impingement B, tenderness throughout upper back/scap mms. Complains of catching pains mid range flexion, scaption, abduction. Treatment Diagnosis: chronic B shoulder upper back pains  REQUIRES PT FOLLOW UP: Yes         Goals  Long term goals  Time Frame for Long term goals : 5 weeks  Long term goal 1: I in home program.  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 Long term goal 5: minimal tenderness to B shoulder/upper back mms.   Patient Goals   Patient goals : decrease pain       Harris Buck, PT

## 2022-01-12 NOTE — FLOWSHEET NOTE
Outpatient Physical Therapy  [place]           [x] Phone: [phone]   Fax: [phone]  [place] [place]           [] Phone: [phone]   Fax: [phone]        Physical Therapy Daily Treatment Note  Date:  2022    Patient Name:  Cori Flores    :  1970  MRN: [id number]  Restrictions/Precautions:  10# lifting  Diagnosis:   Diagnosis: Chronic pain of B shoulders  Date of Injury/Surgery:   Treatment Diagnosis: Treatment Diagnosis: chronic B shoulder upper back pains    Insurance/Certification information: PT Insurance Information: [facility]. Referring Physician:  Referring Practitioner: Dr. Stephanie Moe  Next Doctor Visit:    Plan of care signed (Y/N):  n  Outcome Measure: DASH:  32 raw score  Visit# / total visits:   pending  Pain level: 6/10   Goals:          Long term goals  Time Frame for Long term goals : 5 weeks  Long term goal 1: I in home program.  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 Long term goal 5: minimal tenderness to B shoulder/upper back mms. Summary of Evaluation: Assessment: Pt presents wtih complaints of B shoulder, upper back pains. Pt is s/p R shoulder arthroscopy rotator cuff repair 21. Recent dx of vertebral artery dissection and limited to 10#.  other pmHx: Katy-Danlos syndrome. She has tenderness, loss of motion, pain with movement at B shoulders. + signs of impingement B, tenderness throughout upper back/scap mms. Complains of catching pains mid range flexion, scaption, abduction. Subjective:  See eval         Any changes in Ambulatory Summary Sheet?   None        Objective:  See eval   COVID screening questions were asked and patient attested that there had been no contact or symptoms        Exercises: (No more than 4 columns)   Exercise/Equipment Date 1/12/22 Date Date           WARM UP         UBC            TABLE      AA/PROM Flexion, scaption, ER, IR     sidelying ER                           STANDING      Serratus wall climb w lift off at end      rows      habd      [employer] on wall stability                              MODALITIES                      Other Therapeutic Activities/Education:        Home Exercise Program:        Manual Treatments:  TPR to upper traps, rhomboid, supra and infraspinatus, pectorals, scalenes B. Gentle distraction with AAROM      Modalities:        Communication with other providers:        Assessment:  (Response towards treatment session) (Pain Rating)    Assessment: Pt presents wtih complaints of B shoulder, upper back pains. Pt is s/p R shoulder arthroscopy rotator cuff repair 1/19/21. Recent dx of vertebral artery dissection and limited to 10#.  other pmHx: Katy-Danlos syndrome. She has tenderness, loss of motion, pain with movement at B shoulders. + signs of impingement B, tenderness throughout upper back/scap mms. Complains of catching pains mid range flexion, scaption, abduction.       Plan for Next Session:        Time In / Time Out:  1605/  2305    If [employer] Please Indicate Units for Rx this date and running total for each and overall total for Rx to date:  CPT Code Units today Running Total Units Total approved    TE [phone]       MAN 97297  1 1    Gait 55894      NR [address] [facility]  [address]       [place] [address]      VASO [address]       ADL/Self care 48218      DNT 1-2 68668      DNT 3-4 52964      Other:    1 1           Total for episode of care   2                 Timed Code/Total Treatment Minutes:  12/42      Next Progress Note due:        Plan of Care Interventions:  [] Therapeutic Exercise  [] Modalities:  [x] Therapeutic Activity     [] Ultrasound  [] Estim  [] Gait Training      [] Cervical Traction [] Lumbar Traction  [x]

## 2022-02-08 ENCOUNTER — HOSPITAL ENCOUNTER (OUTPATIENT)
Dept: PHYSICAL THERAPY | Age: 52
Setting detail: THERAPIES SERIES
Discharge: HOME | End: 2022-02-08
Payer: COMMERCIAL

## 2022-02-08 PROCEDURE — 97140 MANUAL THERAPY 1/> REGIONS: CPT

## 2022-02-08 PROCEDURE — 97110 THERAPEUTIC EXERCISES: CPT

## 2022-02-08 NOTE — FLOWSHEET NOTE
Exercises: (No more than 4 columns)   Exercise/Equipment Date 1/12/22 Date 2/8/22 Date           WARM UP         UBC            TABLE      AA/PROM Flexion, scaption, ER, IR Flexion, scaption, ER, IR    sidelying ER      Chest press   2x10 B. STANDING      Serratus wall climb w lift off at end      rows  Green band 3x10    habd      Beatty      ER  w towel roll, yellow band 3x10 each B. Cozette [employer]enezer on wall stability                              MODALITIES                      Other Therapeutic Activities/Education:        Home Exercise Program:        Manual Treatments:  TPR to upper traps, rhomboid, supra and infraspinatus, pectorals, scalenes B. Gentle distraction with AAROM. Gii-II mobilization. Modalities:        Communication with other providers:        Assessment:  (Response towards treatment session) (Pain Rating)     Pt relates decreased pain 4/10 following treatment. Pain limited movements R more than L. Tenderness and guarding with scap and upper back mms. Pain limits activity tolerance. Pt would benefit from additional therapy for pain control, improving motion and strength for functional activities.         Plan for Next Session:        Time In / Time Out:  1350/1430    If [employer] Please Indicate Units for Rx this date and running total for each and overall total for Rx to date:  CPT Code Units today Running Total Units Total approved    TE 32173  2 2    MAN 17719  1 2    Gait 19996      NR 23277      TA  87985      Estim Unatt [address]       [place] [address]       ADL/Self care 98379      DNT 1-2 74198      DNT 3-4 20561      Other:    1 1           Total for episode of care   5                 Timed Code/Total Treatment Minutes:  40/40      Next Progress Note due:        Plan of Care Interventions:  [] Therapeutic Exercise  [] Modalities:  [x] Therapeutic Activity     [] Ultrasound  [] Estim  [] Gait Training      [] Cervical Traction [] Lumbar Traction  [x] Neuromuscular Re-education    [] Cold/hotpack [] Iontophoresis   [x] Instruction in HEP      [] Vasopneumatic   [] Dry Needling    [x] Manual Therapy               [] Aquatic Therapy              Electronically signed by:  Aramis Claudio PT, 2/8/2022, 1:53 PM

## 2022-02-10 ENCOUNTER — HOSPITAL ENCOUNTER (OUTPATIENT)
Dept: PHYSICAL THERAPY | Age: 52
Setting detail: THERAPIES SERIES
Discharge: HOME | End: 2022-02-10
Payer: COMMERCIAL

## 2022-02-10 PROCEDURE — 97140 MANUAL THERAPY 1/> REGIONS: CPT

## 2022-02-10 PROCEDURE — 97110 THERAPEUTIC EXERCISES: CPT

## 2022-02-10 NOTE — FLOWSHEET NOTE
Outpatient Physical Therapy  [place]           [x] Phone: [phone]   Fax: [phone]  [place]           [] Phone: [phone]   Fax: [phone]        Physical Therapy Daily Treatment Note  Date:  2/10/2022    Patient Name:  Cori Flores    :  1970  MRN: [id number]  Restrictions/Precautions:  10# lifting  Diagnosis:   Diagnosis: Chronic pain of B shoulders  Date of Injury/Surgery:   Treatment Diagnosis: Treatment Diagnosis: chronic B shoulder upper back pains    Insurance/Certification information: PT Insurance Information: [facility]. Referring Physician:  Referring Practitioner: Dr. Stephanie Moe  Next Doctor Visit:    Plan of care signed (Y/N):  n  Outcome Measure: DASH:  32 raw score  Visit# / total visits:  3/  Pain level: 10/10   Goals:          Long term goals  Time Frame for Long term goals : 5 weeks  Long term goal 1: I in home program.  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 Long term goal 5: minimal tenderness to B shoulder/upper back mms. Summary of Evaluation: Assessment: Pt presents wtih complaints of B shoulder, upper back pains. Pt is s/p R shoulder arthroscopy rotator cuff repair 21. Recent dx of vertebral artery dissection and limited to 10#.  other pmHx: Katy-Danlos syndrome. She has tenderness, loss of motion, pain with movement at B shoulders. + signs of impingement B, tenderness throughout upper back/scap mms. Complains of catching pains mid range flexion, scaption, abduction. Subjective:  Fibro is flared up for some reason. Bad HA. HR is elevated too. Shoulder, arms are sore. Any changes in Ambulatory Summary Sheet?   None        Objective:    COVID screening questions were asked and patient attested that there had been no contact or symptoms        Exercises: (No more than 4 columns)   Exercise/Equipment Date 1/12/22 Date 2/8/22 Date 2/10/22           WARM UP         UBC            TABLE      AA/PROM Flexion, scaption, ER, IR Flexion, scaption, ER, IR Flexion, scaption, ER, IR   sidelying ER      Chest press   2x10 B. STANDING      Serratus wall climb w lift off at end      rows  Green band 3x10    habd      Beatty      ER  w towel roll, yellow band 3x10 each B. Am[employer] Ros on wall stability                              MODALITIES                      Other Therapeutic Activities/Education:        Home Exercise Program:        Manual Treatments:  TPR to upper traps, rhomboid, supra and infraspinatus, pectorals, scalenes B. Gentle distraction with AAROM. GI-II mobilization     Modalities:        Communication with other providers:        Assessment:  (Response towards treatment session) (Pain Rating)     Pt relates decreased pain 6/10 following treatment. Pain limited participation today. .  Tenderness and guarding with scap and upper back mms. Pain limits activity tolerance. Pt would benefit from additional therapy for pain control, improving motion and strength for functional activities.         Plan for Next Session:        Time In / Time Out:  1115/1150    If [employer] Please Indicate Units for Rx this date and running total for each and overall total for Rx to date:  CPT Code Units today Running Total Units Total approved    TE 13791  1 3    MAN 61105  2 4    Gait 36739      NR 28849      TA  00315      Estim Unatt [address]       [place] [address]      VASO [address]       ADL/Self care 40839      DNT 1-2 14516      DNT 3-4 20561      Other:    1 1           Total for episode of care   8                 Timed Code/Total Treatment Minutes:  40/40      Next Progress Note due:        Plan of Care Interventions:  [] Therapeutic Exercise  [] Modalities:  [x] Therapeutic Activity     [] Ultrasound  [] Estim  [] Gait Training      [] Cervical Traction [] Lumbar Traction  [x] Neuromuscular Re-education    [] Cold/hotpack [] Iontophoresis   [x] Instruction in HEP      [] Vasopneumatic   [] Dry Needling    [x] Manual Therapy               [] Aquatic Therapy              Electronically signed by:  Ya Hussein PT, 2/10/2022, 11:16 AM

## 2022-02-16 ENCOUNTER — HOSPITAL ENCOUNTER (OUTPATIENT)
Dept: PHYSICAL THERAPY | Age: 52
Setting detail: THERAPIES SERIES
Discharge: HOME | End: 2022-02-16
Payer: COMMERCIAL

## 2022-02-16 PROCEDURE — 97140 MANUAL THERAPY 1/> REGIONS: CPT

## 2022-02-16 PROCEDURE — 97110 THERAPEUTIC EXERCISES: CPT

## 2022-02-16 NOTE — FLOWSHEET NOTE
Outpatient Physical Therapy  [place]           [x] Phone: [phone]   Fax: [phone]  [facility]           [] Phone: [phone]   Fax: [phone]        Physical Therapy Daily Treatment Note  Date:  2022    Patient Name:  Liliya Bravo    :  1970  MRN: [id number]  Restrictions/Precautions:  10# lifting  Diagnosis:   Diagnosis: Chronic pain of B shoulders  Date of Injury/Surgery:   Treatment Diagnosis: Treatment Diagnosis: chronic B shoulder upper back pains    Insurance/Certification information: PT Insurance Information: [facility]. Referring Physician:  Referring Practitioner: Dr. Maine Lawson  Next Doctor Visit:    Plan of care signed (Y/N):  n  Outcome Measure: DASH:  32 raw score  Visit# / total visits:  4  Pain level: 5/10   Goals:          Long term goals  Time Frame for Long term goals : 5 weeks  Long term goal 1: I in home program.  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 Long term goal 5: minimal tenderness to B shoulder/upper back mms. Summary of Evaluation: Assessment: Pt presents wtih complaints of B shoulder, upper back pains. Pt is s/p R shoulder arthroscopy rotator cuff repair 21. Recent dx of vertebral artery dissection and limited to 10#.  other pmHx: Katy-Danlos syndrome. She has tenderness, loss of motion, pain with movement at B shoulders. + signs of impingement B, tenderness throughout upper back/scap mms. Complains of catching pains mid range flexion, scaption, abduction. Subjective:    Heartburn, upset stomach. HA is minimal right now, but have slept all day. R Shoulder hurts and catches with movements. Any changes in Ambulatory Summary Sheet?   None        Objective:    COVID screening questions were asked and patient attested that there had been no contact or symptoms   R shoulder 45 ER with pain. PROM flexion: 135 abd 100 all pain limited. 4/5 ER at side with pain. L shoulder 90 Er. Tenderness at R biceps, biceps tendon and groove    Exercises: (No more than 4 columns)   Exercise/Equipment Date 1/12/22 Date 2/8/22 Date 2/10/22 2/16/22            WARM UP          UBC              TABLE       AA/PROM Flexion, scaption, ER, IR Flexion, scaption, ER, IR Flexion, scaption, ER, IR Flexion, scaption, ER, IR   sidelying ER       Chest press   2x10 B. STANDING       Serratus wall climb w lift off at end       rows  Green band 3x10  scap squeezes   habd       Beatty       ER  w towel roll, yellow band 3x10 each B. PROPRIOCEPTION       Ball on wall stability       RS    Loose packed ER, IR                        MODALITIES                         Other Therapeutic Activities/Education:        Home Exercise Program:  Review ER, rows, countertop push up. Table and wall slides. Manual Treatments:  TPR to upper traps, rhomboid, supra and infraspinatus, pectorals, scalenes B. Gentle distraction with AAROM. GI-II mobilization  MFR to biceps, upper trap, pectorals    Modalities:        Communication with other providers:        Assessment:  (Response towards treatment session) (Pain Rating)     Pt relates decreased pain 4/10 following treatment. Pain limits activity tolerance, catching pains at ~ 60 and 90 flexion. Pt would benefit from additional therapy for pain control, improving motion and strength for functional activities.         Plan for Next Session:        Time In / Time Out:  8674/5783    If [employer] Please Indicate Units for Rx this date and running total for each and overall total for Rx to date:  CPT Code Units today Running Total Units Total approved    TE 06417  1 4    MAN 56380  2 6    Gait 25274      [address] [place] [address]      VASO [address]       ADL/Self care 60776      DNT 1-2 20560      DNT 3-4 20561      Other:     1           Total for episode of care   11                 Timed Code/Total Treatment Minutes:  48/48      Next Progress Note due:        Plan of Care Interventions:  [] Therapeutic Exercise  [] Modalities:  [x] Therapeutic Activity     [] Ultrasound  [] Estim  [] Gait Training      [] Cervical Traction [] Lumbar Traction  [x] Neuromuscular Re-education    [] Cold/hotpack [] Iontophoresis   [x] Instruction in HEP      [] Vasopneumatic   [] Dry Needling    [x] Manual Therapy               [] Aquatic Therapy              Electronically signed by:  Amy Jacques, PT, 2/16/2022, 3:26 PM

## 2022-02-22 ENCOUNTER — HOSPITAL ENCOUNTER (OUTPATIENT)
Dept: PHYSICAL THERAPY | Age: 52
Setting detail: THERAPIES SERIES
Discharge: HOME | End: 2022-02-22
Payer: COMMERCIAL

## 2022-02-22 PROCEDURE — 97140 MANUAL THERAPY 1/> REGIONS: CPT

## 2022-02-22 PROCEDURE — 97110 THERAPEUTIC EXERCISES: CPT

## 2022-02-22 NOTE — FLOWSHEET NOTE
Outpatient Physical Therapy  [place]           [x] Phone: [phone]   Fax: [phone]  [place] [place]           [] Phone: [phone]   Fax: [phone]        Physical Therapy Daily Treatment Note  Date:  2022    Patient Name:  Corey Morales    :  1970  MRN: [id number]  Restrictions/Precautions:  10# lifting  Diagnosis:   Diagnosis: Chronic pain of B shoulders  Date of Injury/Surgery:   Treatment Diagnosis: Treatment Diagnosis: chronic B shoulder upper back pains    Insurance/Certification information: PT Insurance Information: [facility]. Referring Physician:  Referring Practitioner: Dr. Dina Braden  Next Doctor Visit:    Plan of care signed (Y/N):  n  Outcome Measure: DASH:  32 raw score  Visit# / total visits:  5  Pain level: 10/10   Goals:          Long term goals  Time Frame for Long term goals : 5 weeks  Long term goal 1: I in home program.  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 Long term goal 5: minimal tenderness to B shoulder/upper back mms. Summary of Evaluation: Assessment: Pt presents [facility] complaints of B shoulder, upper back pains. Pt is s/p R shoulder arthroscopy rotator cuff repair 21. Recent dx of vertebral artery dissection and limited to 10#.  other pmHx: Katy-Danlos syndrome. She has tenderness, loss of motion, pain with movement at B shoulders. + signs of impingement B, tenderness throughout upper back/scap mms. Complains of catching pains mid range flexion, scaption, abduction. Subjective:    R UE numb, painful. R hand feels cold. HA is not bad at all today. Any changes in Ambulatory Summary Sheet? None        Objective:    COVID screening questions were asked and patient attested that there had been no contact or symptoms   R shoulder  45ER with pain.  PROM flexion: 130 fds722 all pain limited. 120flexion after treatment with catching pains. L shoulder 90 Er. Tenderness at R biceps, biceps tendon and groove. Pectorals, scalenes. R hand blushish coloration - color improved with supine positioning and gentle cervical traction. No change in pain or numb feeling. Exercises: (No more than 4 columns)   Exercise/Equipment Date 2/10/22 2/16/22 2/22/22           WARM UP         UBC      [place]      TABLE      AA/PROM Flexion, scaption, ER, IR Flexion, scaption, ER, IR Flexion, scaption, ER, IR   sidelying ER   Increased pain   Chest press    Catching pains. isometrics   Flex, ext, IR, ER 3ct holds            STANDING      Serratus wall climb w lift off at end      rows  scap squeezes Increased pains   habd      Beatty      ER                       PROPRIOCEPTION      Ball on wall stability      RS  Loose packed ER, IR Loose packed ER, IR                     MODALITIES                      Other Therapeutic Activities/Education:        Home Exercise Program:  Review ER, rows, countertop push up. Table and wall slides. Manual Treatments:  TPR to upper traps, rhomboid, supra and infraspinatus, pectorals, scalenes. Gentle distraction with AAROM. GI-II mobilization  MFR to biceps, upper trap, pectorals    Modalities:        Communication with other providers:        Assessment:  (Response towards treatment session) (Pain Rating)     Pt relates decreased pain 9/10 following treatment. Pain limits all activities, catching pains at ~ 60 and 90 flexion. Pt would benefit from additional therapy for pain control, improving motion and strength for functional activities.         Plan for Next Session:        Time In / Time Out:  4373/5048    If [employer] Please Indicate Units for Rx this date and running total for each and overall total for Rx to date:  CPT Code Units today Running Total Units Total approved    TE 93059  1 5    MAN 44903  2 8    Gait [address] [id number]      VASO [address]       ADL/Self care 11564      DNT 1-2 20560      DNT 3-4 20561      Other:     1           Total for episode of care   14                 Timed Code/Total Treatment Minutes:  42/42      Next Progress Note due:        Plan of Care Interventions:  [] Therapeutic Exercise  [] Modalities:  [x] Therapeutic Activity     [] Ultrasound  [] Estim  [] Gait Training      [] Cervical Traction [] Lumbar Traction  [x] Neuromuscular Re-education    [] Cold/hotpack [] Iontophoresis   [x] Instruction in HEP      [] Vasopneumatic   [] Dry Needling    [x] Manual Therapy               [] Aquatic Therapy              Electronically signed by:  Shara Spaulding PT, 2/22/2022, 1:02 PM

## 2022-02-24 ENCOUNTER — HOSPITAL ENCOUNTER (OUTPATIENT)
Dept: PHYSICAL THERAPY | Age: 52
Setting detail: THERAPIES SERIES
Discharge: HOME | End: 2022-02-24
Payer: COMMERCIAL

## 2022-02-24 PROCEDURE — 97140 MANUAL THERAPY 1/> REGIONS: CPT

## 2022-02-24 PROCEDURE — 97110 THERAPEUTIC EXERCISES: CPT

## 2022-02-24 NOTE — PROGRESS NOTES
Outpatient Physical Therapy        [x] Phone: [phone]   Fax: [phone]  Physician:   Dr. Jose Angel Ceballos       From: Adalid Doyle, PT,     Patient: Alexia Ibrahim                    : 1970    Diagnosis: Chronic pain of B shoulders     Treatment Diagnosis: chronic B shoulder upper back pains     Date: 2022    [x]  Progress Note                []  Discharge Note    Total Visits to date:  6     Cancels/No-shows to date:   0    Subjective:  Pt with continued complaints of elevated pain levels that limits activity levels. Complaints of catching pains with movement of R shoulder. Pain today is better than last couple weeks. 6/10. R is worse than L. Prominent Objective Findings:    R shoulder  AROM flexion: 130 , ER45,  Catching pains with R shoulder movements. L shoulder  AROM: 140 flex, Er 90     Tenderness at R biceps, biceps tendon and groove. B Pectorals, scalenes. Pain limited strength R.    + impingement signs B, + speeds R.    dx of vertebral artery dissection on R.  limited to 10#     Assessment:    Long term goal 1: I in home program.        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 Not met  Long term goal 5: minimal tenderness to B shoulder/upper back mms. Tenderness persists    Elevated pain levels limit participation and activities.       Goal Status:  [] Achieved [] Partially Achieved  [x] Not Achieved          Planned Services:  [x] Therapeutic Exercise    [] Modalities:  [] Therapeutic Activity     [] Ultrasound  [] Electric Stimulation  [] Gait Training      [] Cervical Traction    [] Lumbar Traction  [] Neuromuscular Re-education  [] Cold/hotpack [] Iontophoresis  [x] Instruction in HEP      Other:  [] Manual Therapy       []  Vasopneumatic  [] Self care management                           [] Dry needling trigger point/pain management                ? Frequency/Duration: Hold pending additional consultation. # Days per week: [] 1 day # Weeks: [] 1 week [] 4 weeks      [] 2 days? [] 2 weeks [] 5 weeks      [] 3 days   [] 3 weeks [] 6 weeks     Rehab Potential: [] Excellent [] Good [] Fair  [] Poor     Patient Status: [x] Hold pending additional consultation     [] Patient now discharged     [] Additional visits requested, Please re-certify for additional visits:        Electronically signed by:  Praful Osman PT,, 2/24/2022, 12:58 PM    If you have any questions or concerns, please don't hesitate to call.   Thank you for your referral.    Physician Signature:______________________ Date:______ Time: ________  By signing above, therapists plan is approved by physician

## 2022-02-24 NOTE — FLOWSHEET NOTE
Outpatient Physical Therapy  [place]           [x] Phone: [phone]   Fax: [phone]  [facility]           [] Phone: [phone]   Fax: [phone]        Physical Therapy Daily Treatment Note  Date:  2022    Patient Name:  Altagracia Antunez    :  1970  MRN: [id number]  Restrictions/Precautions:  10# lifting  Diagnosis:   Diagnosis: Chronic pain of B shoulders  Date of Injury/Surgery:   Treatment Diagnosis: Treatment Diagnosis: chronic B shoulder upper back pains    Insurance/Certification information: PT Insurance Information: [facility]. Referring Physician:  Referring Practitioner: Dr. Rebecca Garduno  Next Doctor Visit:    Plan of care signed (Y/N):  n  Outcome Measure: DASH:  32 raw score  Visit# / total visits:  6  Pain level: 6/10   Goals:          Long term goals  Time Frame for Long term goals : 5 weeks  Long term goal 1: I in home program.  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 Long term goal 5: minimal tenderness to B shoulder/upper back mms. Summary of Evaluation: Assessment: Pt presents wtih complaints of B shoulder, upper back pains. Pt is s/p R shoulder arthroscopy rotator cuff repair 21. Recent dx of vertebral artery dissection and limited to 10#.  other pmHx: Katy-Danlos syndrome. She has tenderness, loss of motion, pain with movement at B shoulders. + signs of impingement B, tenderness throughout upper back/scap mms. Complains of catching pains mid range flexion, scaption, abduction. Subjective:  Feels like arm is being pulled. No HA        Any changes in Ambulatory Summary Sheet? None        Objective:    COVID screening questions were asked and patient attested that there had been no contact or symptoms   R shoulder  AROM flexion: 130 , ER45,   L shoulder  Er. Tenderness at R biceps, biceps tendon and groove. Pectorals, scalenes. Exercises: (No more than 4 columns)   Exercise/Equipment Date 2/10/22 2/16/22 2/22/22 2/24/22            WARM UP          UBC       P[place]ley       TABLE       AA/PROM Flexion, scaption, ER, IR Flexion, scaption, ER, IR Flexion, scaption, ER, IR Flexion, scaption, ER, IR   sidelying ER   Increased pain w towel roll 2# x10   Chest press    Catching pains. Catching pains 2x10   isometrics   Flex, ext, IR, ER 3ct holds              STANDING       Serratus wall climb w lift off at end       rows  scap squeezes Increased pains Rows blue 2x10   habd       Beatty       ER       Wall wipes    Flexion 2 x20               PROPRIOCEPTION       Ball on wall stability       RS  Loose packed ER, IR Loose packed ER, IR                         MODALITIES                         Other Therapeutic Activities/Education:        Home Exercise Program:  Review ER, rows, countertop push up. Table and wall slides. Manual Treatments:  TPR to upper traps, rhomboid, supra and infraspinatus, pectorals, scalenes. Gentle distraction with AAROM. GI-II mobilization  MFR to biceps, upper trap, pectorals    Modalities:        Communication with other providers:        Assessment:  (Response towards treatment session) (Pain Rating)     Pt relates decreased pain 6/10 following treatment. Pain limits all activities, catching pains at ~ 60 and 90 flexion. Pt to follow up with her doctor regarding her elevated pain levels and limited functional tolerances.           Plan for Next Session:        Time In / Time Out:  0930/1013  If [employer] Please Indicate Units for Rx this date and running total for each and overall total for Rx to date:  CPT Code Units today Running Total Units Total approved  4/15/22    TE 51595  1 6    MAN 06862  2 10    Gait 55856      NR [id number]      TA  [place] [address] ADL/Self care 76675      DNT 1-2 20560      DNT 3-4 20561      Other:     1           Total for episode of care   17 70                Timed Code/Total Treatment Minutes:  43/43      Next Progress Note due:        Plan of Care Interventions:  [] Therapeutic Exercise  [] Modalities:  [x] Therapeutic Activity     [] Ultrasound  [] Estim  [] Gait Training      [] Cervical Traction [] Lumbar Traction  [x] Neuromuscular Re-education    [] Cold/hotpack [] Iontophoresis   [x] Instruction in HEP      [] Vasopneumatic   [] Dry Needling    [x] Manual Therapy               [] Aquatic Therapy              Electronically signed by:  Gio Tristan, PT, 2/24/2022, 9:34 AM

## 2022-04-05 ENCOUNTER — HOSPITAL ENCOUNTER (EMERGENCY)
Age: 52
Discharge: HOME | End: 2022-04-05
Attending: EMERGENCY MEDICINE
Payer: COMMERCIAL

## 2022-04-05 VITALS
BODY MASS INDEX: 36.7 KG/M2 | WEIGHT: 215 LBS | HEIGHT: 64 IN | SYSTOLIC BLOOD PRESSURE: 181 MMHG | DIASTOLIC BLOOD PRESSURE: 87 MMHG | HEART RATE: 75 BPM | TEMPERATURE: 98.4 F | OXYGEN SATURATION: 100 % | RESPIRATION RATE: 16 BRPM

## 2022-04-05 DIAGNOSIS — M54.50 ACUTE RIGHT-SIDED LOW BACK PAIN WITHOUT SCIATICA: Primary | ICD-10-CM

## 2022-04-05 PROCEDURE — 6370000000 HC RX 637 (ALT 250 FOR IP): Performed by: EMERGENCY MEDICINE

## 2022-04-05 PROCEDURE — 99283 EMERGENCY DEPT VISIT LOW MDM: CPT

## 2022-04-05 RX ORDER — LIDOCAINE 4 G/G
1 PATCH TOPICAL DAILY
Qty: 6 PATCH | Refills: 0 | Status: SHIPPED | OUTPATIENT
Start: 2022-04-05 | End: 2022-04-11

## 2022-04-05 RX ORDER — METHOCARBAMOL 500 MG/1
500 TABLET, FILM COATED ORAL ONCE
Status: COMPLETED | OUTPATIENT
Start: 2022-04-05 | End: 2022-04-05

## 2022-04-05 RX ORDER — HYDROCODONE BITARTRATE AND ACETAMINOPHEN 5; 325 MG/1; MG/1
1 TABLET ORAL ONCE
Status: COMPLETED | OUTPATIENT
Start: 2022-04-05 | End: 2022-04-05

## 2022-04-05 RX ORDER — METHOCARBAMOL 750 MG/1
750 TABLET, FILM COATED ORAL 3 TIMES DAILY
Qty: 30 TABLET | Refills: 0 | Status: SHIPPED | OUTPATIENT
Start: 2022-04-05

## 2022-04-05 RX ORDER — NAPROXEN 250 MG/1
500 TABLET ORAL ONCE
Status: COMPLETED | OUTPATIENT
Start: 2022-04-05 | End: 2022-04-05

## 2022-04-05 RX ORDER — NAPROXEN 500 MG/1
500 TABLET ORAL 2 TIMES DAILY
Qty: 60 TABLET | Refills: 0 | Status: SHIPPED | OUTPATIENT
Start: 2022-04-05

## 2022-04-05 RX ADMIN — HYDROCODONE BITARTRATE AND ACETAMINOPHEN 1 TABLET: 5; 325 TABLET ORAL at 15:46

## 2022-04-05 RX ADMIN — METHOCARBAMOL 500 MG: 500 TABLET ORAL at 15:50

## 2022-04-05 RX ADMIN — NAPROXEN 500 MG: 250 TABLET ORAL at 15:46

## 2022-04-05 ASSESSMENT — PAIN DESCRIPTION - LOCATION: LOCATION: BACK;LEG

## 2022-04-05 ASSESSMENT — PAIN DESCRIPTION - ORIENTATION: ORIENTATION: RIGHT

## 2022-04-05 ASSESSMENT — PAIN SCALES - GENERAL
PAINLEVEL_OUTOF10: 10
PAINLEVEL_OUTOF10: 10

## 2022-04-05 NOTE — ED NOTES
Called pharmacy to have them send robaxin d/t being out in all [facility], RN  04/05/22 2016

## 2022-04-05 NOTE — ED PROVIDER NOTES
EMERGENCY DEPARTMENT ENCOUNTER    Patient: Carter Hamlin  MRN: [id number]  : 1970  Date of Evaluation: 2022  ED Provider:  Nina Nash MD    CHIEF COMPLAINT  Chief Complaint   Patient presents with    Back Pain     rt sided, going into rt leg       HPI  Carter Hamlin is a 46 y.o. female who presents with complaints of moderate to severe, constant sharp and aching right lower back pain with radiation to right buttock and proximal posterior right lower extremity which she has had for the last month but has worsened over the last several days. The pain worsens with movement and not alleviated with any medications that the patient tried at home. Denies fever, history of cancer, IVDA, saddle anesthesia, lower extremity weakness or loss of sensation/paresthesias, fecal and urinary incontinence, recent trauma, and recent instrumentation of back. Denies any other associated symptoms or complaints or concerns.       REVIEW OF SYSTEMS    Constitutional: negative for fever, chills  Neurological: negative for HA, focal weakness, loss of sensation  Ophthalmic: negative for vision change  ENT: negative for congestion, rhinorrhea  Cardiovascular: negative for chest pain  Respiratory: negative for SOB, cough  GI: negative for abdominal pain, nausea, vomiting, diarrhea, constipation  : negative for dysuria, hematuria  Musculoskeletal: negative for decreased ROM, joint swelling  Dermatological: negative for rash, wounds  Heme: Negative for bleeding, bruising      PAST MEDICAL HISTORY  Past Medical History:   Diagnosis Date    Arthritis     Neck    Asthma     PCP - last episode:     Diabetes mellitus ([facility] [address]     Type II - follows with PCP    Ehler's-Danlos syndrome Dx:     PCP    Fibromyalgia     Hx of blood clots     Left lower leg - unsure of treatment    Hypertension     Middle cerebral aneurysm     surgery @ [facility]    Mitral valve regurgitation     Originally saw Dr. Connie Padilla, now follows with PCP (updated 1/13/2021)    Raynauds syndrome     Short-term memory loss 2002    due to aneurysm per pt       CURRENT MEDICATIONS  [unfilled]    ALLERGIES  Allergies   Allergen Reactions    Nubain [Nalbuphine Hcl]      Seizures      Secobarbital Sodium Anaphylaxis     SEIZURE    Sulfa Antibiotics Anaphylaxis    Adhesive Tape Rash    Demerol      seizures    Indocin [Indomethacin]        SURGICAL HISTORY  Past Surgical History:   Procedure Laterality Date    ANKLE SURGERY Right 2010    surgery x2    BICEPS TENDON REPAIR Left 1998    in left 5th digit    BRAIN SURGERY  2002    aneurysm repair   [address]    COLONOSCOPY  12/2020    Diverticulosis - Dr. David Abdi, COLON, DIAGNOSTIC  11/2020    \"Took biopsy and was irregular\", has to repeat in Feb 2021   [address] Left 1990's    repair left elbow    SHOULDER ARTHROSCOPY Right 1/19/2021    RIGHT SHOULDER ARTHROSCOPY, SUBACROMIAL DECOMPRESSION DISTAL CLAVICLE RESECTION ROTATOR CUFF REPAIR DEBRIDEMENT performed [facility] Deborah [facility]os DO at [facility]      vein stripping on right x2 / vein stripping on left x8       FAMILY HISTORY  Family History   Problem Relation Age of Onset    Diabetes Mother     Diabetes Father     Heart Disease Father        SOCIAL HISTORY  Social History     Socioeconomic History    Marital status:      Spouse name: None    Number of children: None    Years of education: None    Highest education level: None   Occupational History    None   Tobacco Use    Smoking status: Never Smoker    Smokeless tobacco: Never Used   Vaping Use    Vaping Use: Never used   Substance and Sexual Activity    Alcohol use: Yes     Comment: Rarely - 1 time a year    Drug use: No    Sexual activity: None   Other Topics Concern    None   Social History Narrative    None     Social Determinants of Health     Financial Resource Strain:  Difficulty of Paying Living Expenses: Not on file   Food Insecurity:     Worried About Running Out of Food in the Last Year: Not on file    Ran Out of Food in the Last Year: Not on file   Transportation Needs:     Lack of Transportation (Medical): Not on file    Lack of Transportation (Non-Medical): Not on file   Physical Activity:     Days of Exercise per Week: Not on file    Minutes of Exercise per Session: Not on file   Stress:     Feeling of Stress : Not on file   Social Connections:     Frequency of Communication with Friends and Family: Not on file    Frequency of Social Gatherings with Friends and Family: Not on file    Attends Nondenominational Services: Not on file    Active Member of [address] [place] [address] [employer] or Organizations: Not on file    Attends Club or Organization Meetings: Not on file    Marital Status: Not on file   Intimate Partner Violence:     Fear of Current or Ex-Partner: Not on file    Emotionally Abused: Not on file    Physically Abused: Not on file    Sexually Abused: Not on file   Housing Stability:     Unable to Pay for Housing in the Last Year: Not on file    Number of [place] in the Last Year: Not on file    Unstable Housing in the Last Year: Not on file         **Past medical, family and social histories, and nursing notes reviewed and verified by me**      PHYSICAL EXAM  VITAL SIGNS:   ED Triage Vitals [04/05/22 1358]   Enc Vitals Group      BP (!) 181/87      Pulse 75      Resp 16      Temp 98.4 °F (36.9 °C)      Temp Source Oral      SpO2 100 %      Weight 215 lb (97.5 kg)      Height 5' 4\" (1.626 m)      Head Circumference       Peak Flow       Pain Score       Pain Loc       Pain Edu? Excl. in [address]? Vitals during ED course were reviewed and are as charted.     Constitutional: Minimal distress, Non-toxic appearance  Eyes: Conjunctiva normal, No discharge  HENT: Normocephalic, Atraumatic, bilateral external ears normal, oropharynx moist  Neck: Supple, no midline cervical spinal tenderness, no stridor, no grossly visible or palpable masses  Cardiovascular: Regular rate and rhythm, No murmurs, No rubs, No gallops  Pulmonary/Chest: Normal breath sounds, No respiratory distress or accessory muscle use, No wheezing, crackles or rhonchi. Abdomen: Soft, nondistended and nonrigid, No tenderness or peritoneal signs, No masses, normal bowel sounds  Back: Bilateral lumbar paraspinal muscle tenderness to palpation without palpable deformities and tenderness palpation of the right buttock without palpable deformity, otherwise no midline point tenderness, No paraspinous muscle tenderness elsewhere. No CVA tenderness  Extremities: No gross deformities, no edema, no tenderness  Neurologic: Normal motor function with symmetrical 5 out of 5 strength bilaterally in all extremities, Normal sensory function to light touch and pinprick, No focal deficits, 2+ DTRs  Skin: Warm, Dry, No erythema, No rash, No cyanosis, No mottling  Lymphatic: No lymphadenopathy in the following location(s): cervical  Psychiatric: Alert and oriented x3, Affect normal            RADIOLOGY/PROCEDURES/LABS/MEDICATIONS ADMINISTERED:    I have reviewed and interpreted all of the currently available lab results from this visit (if applicable):  No results found for this visit on 04/05/22.        ABNORMAL LABS:  Labs Reviewed - No data to display      IMAGING STUDIES ORDERED:  None      No orders to display         MEDICATIONS ADMINISTERED:  Medications   HYDROcodone-acetaminophen (NORCO) 5-325 MG per tablet 1 tablet (has no administration in time range)   naproxen (NAPROSYN) tablet 500 mg (has no administration in time range)   methocarbamol (ROBAXIN) tablet 500 mg (has no administration in time range)         COURSE & MEDICAL DECISION MAKING  Last vitals: BP (!) 181/87   Pulse 75   Temp 98.4 °F (36.9 °C) (Oral)   Resp 16   Ht 5' 4\" (1.626 m)   Wt 215 lb (97.5 kg)   SpO2 100%   BMI 36.90 kg/m²     Patient presented with back pain.    I completed a structured, evidence-based clinical evaluation to screen for acute non-traumatic spinal emergencies. Differential diagnoses included, but were not limited to, cauda equina syndrome, severe vertebral disc protrusion/extrusion/rupture with spinal cord compression, spinal abscess/osteomyelitis/discitis, and an acute intra-abdominal, intra-pelvic or retroperitoneal process, including, but not limited to AAA, aortic dissection or other vascular emergency. At this time, this patient exhibits no clinical or historical evidence to suggest or imply these potential causes of back pain. No clinical indication to obtain emergent imaging at this time. Patient was given the above medications with improvement in symptoms. Additional workup and treatment in the ED as documented above. I do believe the patient is a good candidate for outpatient symptomatic treatment. The patient was instructed on this treatment and the course that this type of back pain typically follows. The patient was also educated on back care tips and exercises. I also discussed worrisome symptoms to monitor for at home including, but not limited to, numbness or weakness in the lower extremities, bowel or bladder dysfunction, and numbness in the groin. Patient reassured and will be discharged to home. Advised to f/u with primary care provider. Patient is advised that if symptoms persist that they may benefit from physical therapy or even imaging studies, both of which would likely need to be arranged by the primary care provider. I have given very explicit return precautions, as noted above. Pt and/or family understand and agree with plan. Clinical Impression:  1.  Acute right-sided low back pain without sciatica        Disposition referral (if applicable):  Jessica Lubin MD  [address] [place] [address] [id number]    Schedule an appointment as soon as possible for a visit       [facility]) [facility] Emergency Department  [address]  [phone]    If symptoms worsen      Disposition medications (if applicable):  New Prescriptions    LIDOCAINE 4 % EXTERNAL PATCH    Place 1 patch onto the skin daily for 6 days       ED Provider Disposition Time  DISPOSITION Decision To Discharge 04/05/2022 03:26:39 PM          Electronically signed by: Carissa Dexter M.D., 4/5/2022 3:33 PM      This dictation was created with voice recognition software. While attempts have been made to review the dictation as it is transcribed, on occasion the spoken word can be misinterpreted by the technology leading to omissions or inappropriate words, phrases or sentences.         Giovanni Bobo MD  04/05/22 1460

## 2022-05-10 ENCOUNTER — HOSPITAL ENCOUNTER (OUTPATIENT)
Dept: PHYSICAL THERAPY | Age: 52
Setting detail: THERAPIES SERIES
Discharge: HOME | End: 2022-05-10
Payer: COMMERCIAL

## 2022-05-10 PROCEDURE — 97162 PT EVAL MOD COMPLEX 30 MIN: CPT

## 2022-05-10 PROCEDURE — 97110 THERAPEUTIC EXERCISES: CPT

## 2022-05-10 ASSESSMENT — PAIN SCALES - GENERAL: PAINLEVEL_OUTOF10: 4

## 2022-05-10 NOTE — PLAN OF CARE
Outpatient Physical Therapy                  [x] Phone: [phone]   Fax: [phone]    Pediatric Therapy                                    [] Phone: [phone]   Fax: [phone]  P[facility] [place]                                      [] Phone: [phone]   Fax: [phone]      To:  Dr. Jono Baker    From: Benjamin Camacho, PT,  Patient: Chai Ellsworth       : 1970  Diagnosis: R low back pain, R sciactic   Treatment Diagnosis: Low back pain, radiculopathy. Date: 5/10/2022    Physical Therapy Certification/Re-Certification Form  Dear Dr. Jono Baker  The following patient has been evaluated for physical therapy services and for therapy to continue, Please review the attached evaluation and/or summary of the patient's plan of care, and verify that you agree therapy should continue by signing the attached document and sending it back to our office. Patient is a  47 yo female who presents with low back pain intermittent RLE symptoms which impacts on adls;patient's goal is to decrease pain ;patient reports that pain  limits activities including bed mobility, work, transfers, lifting; PT to address patient's goals, impairments and activity limitations with skilled interventions checked in plan of care;patient's level of function prior to onset of symptoms was  Able to perform above activities without low back pain limiting; did not observe any barriers to learning during PT eval; learning preferences include demonstration, practice, and handouts; patient expressed understanding of HEP; patient appears to be motivated to participate in an active PT program and to be compliant with HEP expectations;patient assisted in developing treatment plan and goals; no DME is currently being used;      Current functional level (based on Oswestry )   : 48%    Assessment:  Assessment: Pt presents with complaints of low back pain R>L, with intermittnet RLE symptoms to post mid thigh.   She has pain with transfers, bed mobility, bending. Tenderness R>L PSIS, LS junction, SIJ, posterior hip. History of Katy Danlo syndrome. + tests with SHIRLEY OTERO on R for R LB/SIJ pain. Plan of Care/Treatment to date:  [x] Therapeutic Exercise    [] Aquatics:  [x] Therapeutic Activity    [] Ultrasound  [] Elec Stimulation  [] Gait Training     [] Cervical Traction [] Lumbar Traction  [x] Neuromuscular Re-education [] Cold/hotpack [] Iontophoresis   [x] Instruction in HEP       [x] Manual Therapy     [] vasopneumatic            [x] Self care home management        []Dry needling trigger point point/pain management          Frequency/Duration:  # Days per week: [] 1 day # Weeks: [] 1 week [x] 5 weeks     [x] 2 days   [] 2 weeks [] 6 weeks     [] 3 days   [] 3 weeks [] 7 weeks     [] 4 days   [] 4 weeks [] 8 weeks    Rehab Potential/Progress: [] Excellent [x] Good [] Fair  [] Poor     Goals:       Long Term Goals  Time Frame for Long term goals : 5 weeks  Long term goal 1: I in home program.  Long term goal 2: Roll in bed with minimal pain. Long term goal 3: sleep without waking due to pain. Long term goal 4: lift household , buckets with minimal pain. Electronically signed by:  Broderick Willis PT, PT, 5/10/2022, 5:41 PM              If you have any questions or concerns, please don't hesitate to call.   Thank you for your referral.      Physician Signature:_________________Date:____________Time: ________  By signing above, therapists plan is approved by physician

## 2022-05-10 NOTE — PROGRESS NOTES
Physical Therapy: Initial Evaluation    Patient: Lopez Wilburn (1 y.o. female)   Examination Date:   Plan of Care Certification Period: 2022 to        :  1970 ;    Confirmed: Yes MRN: [id number]  CSN: [id number]   Insurance: Payor: Rubi García / Plan: Santosh Maldonado / Product Type: *No Product type* /   Insurance ID: [id number] - (Medicaid Managed) Secondary Insurance (if applicable):    Referring Physician: Dayami Deras DO     PCP: Mir Nobles MD Visits to Date/Visits Approved:   /      No Show/Cancelled Appts:   /       Medical Diagnosis: Pain in right shoulder [M25.511] R low back pain, R sciactic  Treatment Diagnosis: Low back pain, radiculopathy.      PERTINENT MEDICAL HISTORY           Medical History:     Past Medical History:   Diagnosis Date    Arthritis     Neck    Asthma     PCP - last episode:     Diabetes mellitus ([facility] [address]     Type II - follows with PCP    Ehler's-Danlos syndrome Dx:     PCP    Fibromyalgia     Hx of blood clots     Left lower leg - unsure of treatment    Hypertension     Middle cerebral aneurysm 2002    surgery [facility]    Mitral valve regurgitation     Originally saw Dr. Bala Bell, now follows with PCP (updated 2021)    Raynauds syndrome     Short-term memory loss     due to aneurysm per pt     Surgical History:   Past Surgical History:   Procedure Laterality Date    ANKLE SURGERY Right 2010    surgery x2   [address]    in left 5th digit    BRAIN SURGERY  2002    aneurysm repair   [address] COLONOSCOPY  2020    Diverticulosis - Dr. Fady Gomez, COLON, DIAGNOSTIC  2020    \"Took biopsy and was irregular\", has to repeat in 2021   [facility] Left     repair left elbow    SHOULDER ARTHROSCOPY Right 2021    RIGHT SHOULDER ARTHROSCOPY, SUBACROMIAL DECOMPRESSION DISTAL CLAVICLE RESECTION ROTATOR CUFF REPAIR DEBRIDEMENT performed by Sarabjit Anderson Abby, DO at [address] [facility]    VEIN SURGERY      vein stripping on right x2 / vein stripping on left x8       Medications:   Current Outpatient Medications:     methocarbamol (ROBAXIN) 750 MG tablet, Take 1 tablet by mouth 3 times daily, Disp: 30 tablet, Rfl: 0    naproxen (NAPROSYN) 500 MG tablet, Take 1 tablet by mouth 2 times daily, Disp: 60 tablet, Rfl: 0    medroxyPROGESTERone (DEPO-PROVERA) 150 MG/ML injection, BRING TO OFFICE EVERY 3 MONTHS FOR INTRAMUSCULAR INJECTION, Disp: , Rfl:     oxybutynin (DITROPAN) 5 MG tablet, TAKE 1 TABLET BY MOUTH TWO TIMES A DAY, Disp: , Rfl:     fluticasone (FLONASE) 50 MCG/ACT nasal spray, USE 2 SPRAYS IN EACH NOSTRIL ONCE DAILY, Disp: , Rfl:     losartan-hydroCHLOROthiazide (HYZAAR) 100-25 MG per tablet, TAKE 1 TABLET BY MOUTH IN THE MORNING, Disp: , Rfl:     doxycycline hyclate (VIBRA-TABS) 100 MG tablet, TAKE 1 CAPSULE BY MOUTH TWO TIMES A DAY FOR 10 DAYS, Disp: , Rfl:     doxepin (SINEQUAN) 50 MG capsule, TAKE 1 CAPSULE BY MOUTH IN THE EVENING, Disp: , Rfl:     clotrimazole-betamethasone (LOTRISONE) 1-0.05 % cream, APPLY 1-2 GRAMS TOPICALLY TO THE AFFECTED AREA EVERY 12 HOURS AS NEEDED FOR INFLAMMATION, Disp: , Rfl:     famotidine (PEPCID) 20 MG tablet, , Disp: , Rfl:     meloxicam (MOBIC) 15 MG tablet, , Disp: , Rfl:     doxazosin (CARDURA) 4 MG tablet, TAKE 1 TABLET BY MOUTH AT BEDTIME, Disp: , Rfl:     Rimegepant Sulfate (NURTEC) 75 MG TBDP, Take by mouth, Disp: , Rfl:     TRULICITY 1.5 TB/6.1IX SOPN, INJECT 1.5MG UNDER THE SKIN  AS DIRECTED EVERY WEEK, Disp: , Rfl:     oxybutynin (DITROPAN-XL) 5 MG extended release tablet, Take 5 mg by mouth 2 times daily, Disp: , Rfl:     losartan (COZAAR) 100 MG tablet, Take 100 mg by mouth daily, Disp: , Rfl:     traZODone (DESYREL) 100 MG tablet, Take 50 mg by mouth nightly as needed for Sleep , Disp: , Rfl:     ibuprofen (ADVIL;MOTRIN) 800 MG tablet, Take 800 mg by mouth every 6 hours as needed for Pain , Disp: , Rfl:     glipiZIDE (GLUCOTROL) 10 MG tablet, Take 10 mg by mouth daily, Disp: , Rfl:     tiZANidine (ZANAFLEX) 4 MG tablet, Take 4 mg by mouth every 8 hours as needed , Disp: , Rfl:     rosuvastatin (CRESTOR) 20 MG tablet, Take 20 mg by mouth daily, Disp: , Rfl:     acyclovir (ZOVIRAX) 400 MG tablet, , Disp: , Rfl:     magnesium oxide (MAG-OX) 400 MG tablet, Take 400 mg by mouth daily, Disp: , Rfl:     potassium chloride SA (K-DUR;KLOR-CON M) 20 MEQ tablet, Take 20 mEq by mouth 2 times daily , Disp: , Rfl:     vitamin D (MAXIMUM D3) 38821 UNITS CAPS capsule, Take 10,000 Units by mouth daily (with breakfast) , Disp: , Rfl:     butalbital-acetaminophen-caffeine (FIORICET) per tablet, Take 1 tablet by mouth every 4 hours as needed for Pain., Disp: 180 tablet, Rfl: 0  Allergies: Nubain [nalbuphine hcl], Secobarbital sodium, Sulfa antibiotics, Adhesive tape, Demerol, and Indocin [indomethacin]      SUBJECTIVE EXAMINATION      ,      Family/Caregiver Present: No    Subjective History:    Subjective: R low back pain, intermittent RLE symptoms to mid post thigh. Additional Pertinent Hx (if applicable): fell about 2 months ago and had increase in LBP and RLE symptoms. pmhx:  EDS.           Learning/Language: Learning  Does the patient/guardian have any barriers to learning?: No barriers  Will there be a co-learner?: No  What is the preferred language of the patient/guardian?: English  Is an  required?: No  How does the patient/guardian prefer to learn new concepts?: Listening,Reading,Demonstration,Pictures/Videos     Pain Screening   Pain Screening  Patient Currently in Pain: Yes  Pain Level: 4 (8-9 at night and early mornings)    Functional Status         Social History:  Social History  Lives With: Family  Type of Home: House    Occupation/Interests:  Occupation: Self employed  Type of Occupation: Cleaning    Prior Level of Function:             Current Level of Function:         ADL Assistance: Independent  Homemaking Assistance: Independent  Homemaking Responsibilities: Yes  Ambulation Assistance: Independent  Transfer Assistance: Independent  Active : Yes    OBJECTIVE EXAMINATION   Restrictions:             Review of Systems:  Vision: Within Functional Limits  Hearing: Within functional limits  Overall Orientation Status: Within Functional Limits    VBI Screening / Lumbar Screening:   Bowel/bladder disturbances: No  Saddle anesthesia: No  Unexplained weight loss: No  Severe motor weakness: No  Stumbling or giving way while walking: No    Regional Screen:   SIJ Screen: neg sacral compression/disctaction     Observations:       Palpation:   Lumbar Spine Palpation: tender PSIS, LS jucntion, piriformis, gluteal.    Left AROM  Right AROM                    Left PROM  Right PROM                    Left Strength  Right Strength      General Strength Testing LE: Right WFL,Left WFL (hip motions were painful)    General Strength Testing LE: Right WFL,Left WFL (hip motions were painful)        Lumbar Assessment   AROM Lumbar Spine   Lumbar Spine AROM : WFL (but painful, worst with extension)       Trunk Strength           Muscle Length/Flexibility:      Joint Mobility (if applicable):        Special Tests:   Special Tests Lumbar Spine  Lumbar Special Tests:  (SLR mild tightness. R SHIRLEY OTERO increased LBP.)     ASSESSMENT     Impression: Assessment: Pt presents with complaints of low back pain R>L, with intermittnet RLE symptoms to post mid thigh. She has pain with transfers, bed mobility, bending. Tenderness R>L PSIS, LS junction, SIJ, posterior hip. History of Katy Danlo syndrome. + tests with SHIRLEY OTERO on R for R LB/SIJ pain. Statement of Medical Necessity: Physical Therapy is both indicated and medically necessary as outlined in the POC to increase the likelihood of meeting the functionally related goals stated below.      Patient's Activity Tolerance:        Patient's rehabilitation potential/prognosis is considered to be: Good    Factors which may impact rehabilitation potential include:          GOALS   Patient Goal(s): decrease pain  Short Term Goals Completed by   Goal Status                                     Long Term Goals Completed by 5 weeks Goal Status   I in home program.     roll in bed with minimal pain     supine to/from sit and sit to/from stand with minimal pain. 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 TREATMENT PLAN            Pt. actively involved in establishing Plan of Care and Goals: Yes  Patient/ Caregiver education and instruction:               Treatment may include any combination of the following:       Frequency / Duration:  Patient to be seen 2   for6   weeks       Therapist Signature: Ivey Lundborg, PT    Date: 5/19/4872     I certify that the above Therapy Services are being furnished while the patient is under my care. I agree with the treatment plan and certify that this therapy is necessary. [de-identified] Signature:  ___________________________   Date:_______                                                                   Sunil Santa DO        Physician Comments: _______________________________________________    Please sign and return to [address] [place] [address]. Please fax to the location listed below.  Luke Chance for this referral!    [address] [facility] [address], [address] [address]  Dept: [phone]  Loc: [phone]       POC NOTE

## 2022-05-10 NOTE — FLOWSHEET NOTE
Outpatient Physical Therapy  [place]           [x] Phone: [phone]   Fax: [phone]  [facility]           [] Phone: [phone]   Fax: [phone]        Physical Therapy Daily Treatment Note  Date:  5/10/2022    Patient Name:  Jaky Muniz    :  1970  MRN: [id number]  Restrictions/Precautions: No data recorded      Diagnosis:    Diagnosis: R low back pain, R sciactic  Date of Injury/Surgery:   Treatment Diagnosis:  Low back pain, radiculopathy. Insurance/Certification information: [facility]  Referring Physician:  Kristy Palomino DO     PCP: Milton Ferrari MD  Next Doctor Visit:    Plan of care signed (Y/N):  n  Outcome Measure: Oswestry 48%  Visit# / total visits:   /  Pain level: 8/10   Goals:     Patient goals: decrease pain   Long Term Goals  Time Frame for Long Term Goals: 5 weeks  I in home program.  Roll in bed with minimal pain. sleep without waking due to pain. lift household , buckets with minimal pain. Summary of Evaluation:  Assessment: Pt presents with complaints of low back pain R>L, with intermittnet RLE symptoms to post mid thigh. She has pain with transfers, bed mobility, bending. Tenderness R>L PSIS, LS junction, SIJ, posterior hip. History of Katy Danlo syndrome. + tests with SHIRLEY OTERO on R for R LB/SIJ pain. Subjective:  See eval         Any changes in Ambulatory Summary Sheet?   None        Objective:  See eval   COVID screening questions were asked and patient attested that there had been no contact or symptoms        Exercises: (No more than 4 columns)   Exercise/Equipment Date 5/10/22 Date Date           WARM UP                     TABLE      TA contraction 5ct holds     bridge x10     clamshells x10     Piriformis stretch 30 sec x 3              STANDING      Hip abd      Hip extension      Pallof/belly press      Monster walk                             PROPRIOCEPTION      SB progression MODALITIES                      Other Therapeutic Activities/Education:        Home Exercise Program:  TA, severo, mini bridges. Manual Treatments:  Hooklying SI mobilization      Modalities:        Communication with other providers:        Assessment:  (Response towards treatment session) (Pain Rating)  Assessment: Pt presents with complaints of low back pain R>L, with intermittnet RLE symptoms to post mid thigh. She has pain with transfers, bed mobility, bending. Tenderness R>L PSIS, LS junction, SIJ, posterior hip. History of Katy Danlo syndrome. + tests with SHIRLEY OTERO on R for R LB/SIJ pain.       Plan for Next Session:        Time In / Time Out:    7014/0943     If [employer] Please Indicate Units for Rx this date and running total for each and overall total for Rx to date:  CPT Code Units today Running Total Units Total approved    TE [id number]  1 1    MAN [id number]       Gait 18650      NR 70815      TA  [id number] [place] [address] [phone]      VASO 10108       ADL/Self care 08624      DNT 1-2 44105      DNT 3-4 20561      Other:    1 1           Total for episode of care                  Timed Code/Total Treatment Minutes: 10 /40      Next Progress Note due:        Plan of Care Interventions:  [x] Therapeutic Exercise  [] Modalities:  [x] Therapeutic Activity     [] Ultrasound  [] Estim  [] Gait Training      [] Cervical Traction [] Lumbar Traction  [x] Neuromuscular Re-education    [] Cold/hotpack [] Iontophoresis   [x] Instruction in HEP      [] Vasopneumatic   [] Dry Needling    [x] Manual Therapy               [] Aquatic Therapy              Electronically signed by:  Broderick Willis PT, 5/10/2022, 5:36 PM

## 2022-05-12 ENCOUNTER — HOSPITAL ENCOUNTER (OUTPATIENT)
Dept: PHYSICAL THERAPY | Age: 52
Setting detail: THERAPIES SERIES
Discharge: HOME | End: 2022-05-12
Payer: COMMERCIAL

## 2022-05-12 PROCEDURE — 97110 THERAPEUTIC EXERCISES: CPT

## 2022-05-12 PROCEDURE — 97140 MANUAL THERAPY 1/> REGIONS: CPT

## 2022-05-12 NOTE — FLOWSHEET NOTE
Patients Plan of Care was received and signed. Signed POC was scanned and placed in the patients chart.     Shanice Og

## 2022-05-12 NOTE — FLOWSHEET NOTE
Outpatient Physical Therapy  [place]           [x] Phone: [phone]   Fax: [phone]  [facility]           [] Phone: [phone]   Fax: [phone]        Physical Therapy Daily Treatment Note  Date:  2022    Patient Name:  Macho Sheppard    :  1970  MRN: [id number]  Restrictions/Precautions: No data recorded      Diagnosis:    Diagnosis: R low back pain, R sciactic  Date of Injury/Surgery:   Treatment Diagnosis:  Low back pain, radiculopathy. Insurance/Certification information: [facility]  Referring Physician:  Beto Marsh DO     PCP: Meg Iniguez MD  Next Doctor Visit:    Plan of care signed (Y/N):  n  Outcome Measure: Oswestry 48%  Visit# / total visits: 2  Pain level: 810   Goals:     Patient goals: decrease pain   Long Term Goals  Time Frame for Long Term Goals:    I in home program.  Roll in bed with minimal pain. sleep without waking due to pain. lift household , buckets with minimal pain. Summary of Evaluation:  Assessment: Pt presents with complaints of low back pain R>L, with intermittnet RLE symptoms to post mid thigh. She has pain with transfers, bed mobility, bending. Tenderness R>L PSIS, LS junction, SIJ, posterior hip. History of Katy Danlo syndrome. + tests with SHIRLEY OTERO on R for R LB/SIJ pain. Subjective:  RLE numb at hip and thigh. Was going below knee yesterday. Any changes in Ambulatory Summary Sheet?   None        Objective:    COVID screening questions were asked and patient attested that there had been no contact or symptoms        Exercises: (No more than 4 columns)   Exercise/Equipment Date 5/10/22 Date 22 Date           WARM UP                     TABLE      TA contraction 5ct holds     bridge x10     clamshells x10     Piriformis stretch 30 sec x 3       Ant/post tilt   Difficulty performing    STANDING      Hip abd      Hip extension      Pallof/belly press      Monster walk      Heel raise  3x10 PROPRIOCEPTION      SB progression  Ccw/cw circles x 15 each way. Ant/post tilting      Seated march 2x10 w TA                 MODALITIES                      Other Therapeutic Activities/Education:        Home Exercise Program:  TA, clamshells, mini bridges. Manual Treatments:  Hooklying SI mobilization, leg pulls. TPR to gluteals, TFL, ITB. Modalities:        Communication with other providers:        Assessment:  (Response towards treatment session) (Pain Rating)   no numbness follow treatment  Pt would benefit from additional therapy to decrease pain and improve functional activities.       Plan for Next Session:        Time In / Time Out:    7030/3237     If [employer] Please Indicate Units for Rx this date and running total for each and overall total for Rx to date:  CPT Code Units today Running Total Units Total approved    TE [id number]  1 2    MAN 38593  2 2    Gait 17741      NR 70628      TA  [id number] [place] [address]      VASO 17929       ADL/Self care 24109      DNT 1-2 49050      DNT 3-4 20561      Other:    1 1           Total for episode of care   5               Timed Code/Total Treatment Minutes: 40/40      Next Progress Note due:        Plan of Care Interventions:  [x] Therapeutic Exercise  [] Modalities:  [x] Therapeutic Activity     [] Ultrasound  [] Estim  [] Gait Training      [] Cervical Traction [] Lumbar Traction  [x] Neuromuscular Re-education    [] Cold/hotpack [] Iontophoresis   [x] Instruction in HEP      [] Vasopneumatic   [] Dry Needling    [x] Manual Therapy               [] Aquatic Therapy              Electronically signed by:  Rachel Leblanc PT, 5/12/2022, 1:04 PM

## 2022-05-16 ENCOUNTER — HOSPITAL ENCOUNTER (OUTPATIENT)
Dept: PHYSICAL THERAPY | Age: 52
Setting detail: THERAPIES SERIES
Discharge: HOME | End: 2022-05-16
Payer: COMMERCIAL

## 2022-05-16 PROCEDURE — 97110 THERAPEUTIC EXERCISES: CPT

## 2022-05-16 PROCEDURE — 97140 MANUAL THERAPY 1/> REGIONS: CPT

## 2022-05-16 NOTE — FLOWSHEET NOTE
Outpatient Physical Therapy  [place]           [x] Phone: [phone]   Fax: [phone]  [place]           [] Phone: [phone]   Fax: [phone]        Physical Therapy Daily Treatment Note  Date:  2022    Patient Name:  Jaky Muniz    :  1970  MRN: [id number]  Restrictions/Precautions: No data recorded      Diagnosis:    Diagnosis: R low back pain, R sciactic  Date of Injury/Surgery:   Treatment Diagnosis:  Low back pain, radiculopathy. Insurance/Certification information: [facility]  Referring Physician:  Kristy Palomino DO     PCP: Milton Ferrari MD  Next Doctor Visit:    Plan of care signed (Y/N):  n  Outcome Measure: Oswestry 48%  Visit# / total visits: 3  Pain level: 7/10   Goals:     Patient goals: decrease pain   Long Term Goals  Time Frame for Long Term Goals:    I in home program.  Roll in bed with minimal pain. sleep without waking due to pain. lift household , buckets with minimal pain. Summary of Evaluation:  Assessment: Pt presents with complaints of low back pain R>L, with intermittnet RLE symptoms to post mid thigh. She has pain with transfers, bed mobility, bending. Tenderness R>L PSIS, LS junction, SIJ, posterior hip. History of Katy Danlo syndrome. + tests with SHIRLEY OTERO on R for R LB/SIJ pain. Subjective:  RLE numb at hip. Did sleep well at all last night, pain rolling and getting comfortable. Any changes in Ambulatory Summary Sheet?   None        Objective:    COVID screening questions were asked and patient attested that there had been no contact or symptoms    Tenderness at R gluteals and piriformis      Exercises: (No more than 4 columns)   Exercise/Equipment Date 5/10/22 Date 22 Date17 #3           WARM UP                     TABLE      TA contraction 5ct holds     bridge x10     clamshells x10     Piriformis stretch 30 sec x 3  30 sec x 3     Ant/post tilt   Difficulty performing    STANDING Hip abd   3x12   Hip extension   2x10   Pallof/belly press   FM 3# 2x12   Monster walk      Heel raise  3x10    Rows   CC #20 2x12    TG squat      lvl 10 3x12   PROPRIOCEPTION      SB progression  Ccw/cw circles x 15 each way. Ant/post tilting      Seated march 2x10 w TA                 MODALITIES                      Other Therapeutic Activities/Education:        Home Exercise Program:  TA, clamshells, mini bridges. Manual Treatments:  Hooklying SI mobilization, leg pulls. TPR to gluteals, TFL, ITB. Modalities:        Communication with other providers:        Assessment:  (Response towards treatment session) (Pain Rating) 6/10  Overall decreased radicular symptoms. Tenderness at gluteals. Pain with transfers and bed mobility persist.   Pt would benefit from additional therapy to decrease pain and improve functional activities.       Plan for Next Session:        Time In / Time Out:    7430/8058     If [employer] Please Indicate Units for Rx this date and running total for each and overall total for Rx to date:  CPT Code Units today Running Total Units Total approved    TE [id number]  2 4    MAN 44161  1 3    Gait 57817      NR 12167      TA  69555      Estim Unatt 64[address]       [address] [facility] [phone]      VASO 11994       ADL/Self care 90577      DNT 1-2 38116      DNT 3-4 00153      Other:    1 1           Total for episode of care   8               Timed Code/Total Treatment Minutes: 40/40      Next Progress Note due:        Plan of Care Interventions:  [x] Therapeutic Exercise  [] Modalities:  [x] Therapeutic Activity     [] Ultrasound  [] Estim  [] Gait Training      [] Cervical Traction [] Lumbar Traction  [x] Neuromuscular Re-education    [] Cold/hotpack [] Iontophoresis   [x] Instruction in HEP      [] Vasopneumatic   [] Dry Needling    [x] Manual Therapy               [] Aquatic Therapy              Electronically signed by:  Grant Cardona PT, 5/16/2022, 7:16 AM

## 2022-05-19 ENCOUNTER — HOSPITAL ENCOUNTER (OUTPATIENT)
Dept: PHYSICAL THERAPY | Age: 52
Setting detail: THERAPIES SERIES
Discharge: HOME | End: 2022-05-19
Payer: COMMERCIAL

## 2022-05-19 PROCEDURE — 97140 MANUAL THERAPY 1/> REGIONS: CPT

## 2022-05-19 PROCEDURE — 97110 THERAPEUTIC EXERCISES: CPT

## 2022-05-19 NOTE — FLOWSHEET NOTE
Outpatient Physical Therapy  [place]           [x] Phone: [phone]   Fax: [phone]  [facility]           [] Phone: [phone]   Fax: [phone]        Physical Therapy Daily Treatment Note  Date:  2022    Patient Name:  Bentley Verma    :  1970  MRN: [id number]  Restrictions/Precautions: No data recorded      Diagnosis:    Diagnosis: R low back pain, R sciactic  Date of Injury/Surgery:   Treatment Diagnosis:  Low back pain, radiculopathy. Insurance/Certification information: [facility]  Referring Physician:  Dmitriy Rahman DO     PCP: Eloisa Carver MD  Next Doctor Visit:    Plan of care signed (Y/N):  n  Outcome Measure: Oswestry 48%  Visit# / total visits: 4  Pain level: 5/10   Goals:     Patient goals: decrease pain   Long Term Goals  Time Frame for Long Term Goals:    I in home program.  Roll in bed with minimal pain. sleep without waking due to pain. lift household , buckets with minimal pain. Summary of Evaluation:  Assessment: Pt presents with complaints of low back pain R>L, with intermittnet RLE symptoms to post mid thigh. She has pain with transfers, bed mobility, bending. Tenderness R>L PSIS, LS junction, SIJ, posterior hip. History of Katy Danlo syndrome. + tests with SHIRLEY OTERO on R for R LB/SIJ pain. Subjective: Worn out, Dad had to have additional stents placed. Low back isn't bad, buttocks        Any changes in Ambulatory Summary Sheet?   None        Objective:    COVID screening questions were asked and patient attested that there had been no contact or symptoms          Exercises: (No more than 4 columns)   Exercise/Equipment Date 5/10/22 Date 22 Date17 #3 22 #4            WARM UP                        TABLE       TA contraction 5ct holds      bridge x10   3x10 small range   clamshells x10   3x10   Piriformis stretch 30 sec x 3  30 sec x 3 3 x 30 sec     Ant/post tilt   Difficulty performing     STANDING Hip abd   3x12 3 x 10   Hip extension   2x10 2x10   Pallof/belly press   FM 3# 2x12    Monster walk       Heel raise  3x10     Rows   CC #20 2x12     TG squat      lvl 10 3x12 Sit/stands 21\"  3x10   PROPRIOCEPTION       SB progression  Ccw/cw circles x 15 each way. Ant/post tilting       Seated march 2x10 w TA                    MODALITIES                         Other Therapeutic Activities/Education:        Home Exercise Program:  TA, clamshells, mini bridges. Manual Treatments:  Hooklying SI mobilization, leg pulls. TPR to gluteals, TFL, ITB. Modalities:        Communication with other providers:        Assessment:  (Response towards treatment session) (Pain Rating) 6/10  Tenderness at R gluteals - decreased with manual and stretching. Hip mms fatigue quickly. Pt would benefit from additional therapy to decrease pain and improve functional activities.       Plan for Next Session:        Time In / Time Out:    7495/4031     If [employer] Please Indicate Units for Rx this date and running total for each and overall total for Rx to date:  CPT Code Units today Running Total Units Total approved    TE 12430  2 6    MAN 89541  1 4    Gait 48567      NR 72704      TA  76726      Estim Unatt 15371       [address] [facility] [phone]      VASO 61210       ADL/Self care 42757      DNT 1-2 99987      DNT 3-4 38644      Other:    1 1           Total for episode of care   11               Timed Code/Total Treatment Minutes: 40/40      Next Progress Note due:        Plan of Care Interventions:  [x] Therapeutic Exercise  [] Modalities:  [x] Therapeutic Activity     [] Ultrasound  [] Estim  [] Gait Training      [] Cervical Traction [] Lumbar Traction  [x] Neuromuscular Re-education    [] Cold/hotpack [] Iontophoresis   [x] Instruction in HEP      [] Vasopneumatic   [] Dry Needling    [x] Manual Therapy               [] Aquatic Therapy              Electronically signed by:  Awilda Gonzalez PT, 5/19/2022, 7:14 AM

## 2022-05-24 ENCOUNTER — HOSPITAL ENCOUNTER (OUTPATIENT)
Dept: PHYSICAL THERAPY | Age: 52
Setting detail: THERAPIES SERIES
Discharge: HOME | End: 2022-05-24
Payer: COMMERCIAL

## 2022-05-24 PROCEDURE — 97110 THERAPEUTIC EXERCISES: CPT

## 2022-05-24 PROCEDURE — 97140 MANUAL THERAPY 1/> REGIONS: CPT

## 2022-05-24 NOTE — FLOWSHEET NOTE
Physical Therapy  Cancellation/No-show Note  Patient Name:  Sky Dominique  :  1970   Date:  2022  Cancelled visits to date: 0  No-shows to date: 1    For today's appointment patient:  []  Cancelled  []  Rescheduled appointment  [x]  No-show     Reason given by patient:  []  Patient ill  []  Conflicting appointment  []  No transportation    []  Conflict with work  []  No reason given  []  Other:     Comments:      Electronically signed by:  Awilda Gonzalez PT,

## 2022-05-24 NOTE — FLOWSHEET NOTE
Outpatient Physical Therapy  [place]           [x] Phone: [phone]   Fax: [phone]  [place]           [] Phone: [phone]   Fax: [phone]        Physical Therapy Daily Treatment Note  Date:  2022    Patient Name:  Ronald Soto    :  1970  MRN: [id number]  Restrictions/Precautions: No data recorded      Diagnosis:    Diagnosis: R low back pain, R sciactic  Date of Injury/Surgery:   Treatment Diagnosis:  Low back pain, radiculopathy. Insurance/Certification information: [facility]  Referring Physician:  Lindsey Lagunas DO     PCP: Damari Waite MD  Next Doctor Visit:    Plan of care signed (Y/N):  n  Outcome Measure: Oswestry 48%  Visit# / total visits: 5  Pain level: 5/10   Goals:     Patient goals: decrease pain   Long Term Goals  Time Frame for Long Term Goals:    I in home program.  Roll in bed with minimal pain. sleep without waking due to pain. lift household , buckets with minimal pain. Summary of Evaluation:  Assessment: Pt presents with complaints of low back pain R>L, with intermittnet RLE symptoms to post mid thigh. She has pain with transfers, bed mobility, bending. Tenderness R>L PSIS, LS junction, SIJ, posterior hip. History of Katy Danlo syndrome. + tests with SHIRLEY OTERO on R for R LB/SIJ pain. Subjective: Worked till 1100PM last night, everything is pretty sore and tired. Any changes in Ambulatory Summary Sheet?   None        Objective:    COVID screening questions were asked and patient attested that there had been no contact or symptoms      Palpation:   Non tender SI, lumbar, iliac crest.      Exercises: (No more than 4 columns)   Exercise/Equipment Date 5/10/22 Date 22 Date17 #3 22 #4 22 #5             WARM UP                           TABLE        TA contraction 5ct holds       bridge x10   3x10 small range 3x10   clamshells x10   3x10 Supine w red    Piriformis stretch 30 sec x 3  30 sec x 3 3 x 30 sec    SLR w TA      3x10     Ant/post tilt   Difficulty performing      STANDING        Hip abd   3x12 3 x 10    Hip extension   2x10 2x10    Pallof/belly press   FM 3# 2x12  Green 3x10   Monster walk        Heel raise  3x10      Side stepping     25 ft x 2 each way   Rows   CC #20 2x12      TG squat      lvl 10 3x12 Sit/stands 21\"  3x10    PROPRIOCEPTION        SB progression  Ccw/cw circles x 15 each way. Ant/post tilting        Seated march 2x10 w TA                       MODALITIES                            Other Therapeutic Activities/Education:        Home Exercise Program:  TA, clamshells, mini bridges. Manual Treatments:  Hooklying SI mobilization. TPR to gluteals, TFL, ITB. Modalities:        Communication with other providers:        Assessment:  (Response towards treatment session) (Pain Rating) 5/10  Tenderness at R gluteals, non tender SI, lumbar. Hip mms fatigue quickly with exercises. Pt would benefit from additional therapy to decrease pain and improve functional activities.       Plan for Next Session:        Time In / Time Out:    1555 pt late/759     If [employer] Please Indicate Units for Rx this date and running total for each and overall total for Rx to date:  CPT Code Units today Running Total Units Total approved    TE 18127  1 7    MAN 20411  1 5    Gait 13736      NR 22163      TA  25478      Estim Unatt 94777       [address] [phone]      VASO 81430       ADL/Self care 74792      DNT 1-2 82041      DNT 3-4 11765      Other:    0 1           Total for episode of care   13               Timed Code/Total Treatment Minutes: 30/30      Next Progress Note due:        Plan of Care Interventions:  [x] Therapeutic Exercise  [] Modalities:  [x] Therapeutic Activity     [] Ultrasound  [] Estim  [] Gait Training      [] Cervical Traction [] Lumbar Traction  [x] Neuromuscular Re-education    [] Cold/hotpack [] Iontophoresis   [x] Instruction in HEP      [] Vasopneumatic   [] Dry Needling    [x] Manual Therapy               [] Aquatic Therapy              Electronically signed by:  Enoc Stanton PT, 5/24/2022, 7:29 AM

## 2022-05-26 ENCOUNTER — HOSPITAL ENCOUNTER (OUTPATIENT)
Dept: PHYSICAL THERAPY | Age: 52
Setting detail: THERAPIES SERIES
Discharge: HOME | End: 2022-05-26
Payer: COMMERCIAL

## 2022-05-26 PROCEDURE — 97110 THERAPEUTIC EXERCISES: CPT

## 2022-05-26 PROCEDURE — 97140 MANUAL THERAPY 1/> REGIONS: CPT

## 2022-05-26 NOTE — FLOWSHEET NOTE
Outpatient Physical Therapy  [place]           [x] Phone: [phone]   Fax: [phone]  [facility]           [] Phone: [phone]   Fax: [phone]        Physical Therapy Daily Treatment Note  Date:  2022    Patient Name:  Joel Lyman    :  1970  MRN: [id number]  Restrictions/Precautions: No data recorded      Diagnosis:    Diagnosis: R low back pain, R sciactic  Date of Injury/Surgery:   Treatment Diagnosis:  Low back pain, radiculopathy. Insurance/Certification information: [facility]  Referring Physician:  Ismael Allen DO     PCP: Jhon Mcmillan MD  Next Doctor Visit:    Plan of care signed (Y/N):  n  Outcome Measure: Oswestry 48%  Visit# / total visits: 6  Pain level: /10   Goals:     Patient goals: decrease pain   Long Term Goals  Time Frame for Long Term Goals:    I in home program.  Roll in bed with minimal pain. sleep without waking due to pain. lift household , buckets with minimal pain. Summary of Evaluation:  Assessment: Pt presents with complaints of low back pain R>L, with intermittnet RLE symptoms to post mid thigh. She has pain with transfers, bed mobility, bending. Tenderness R>L PSIS, LS junction, SIJ, posterior hip. History of Katy Danlo syndrome. + tests with SHIRLEY OTERO on R for R LB/SIJ pain. Subjective:  Neck and shoulders were really sore after last time. Ankle is also sore. 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 Any changes in Ambulatory Summary Sheet? None        Objective:    COVID screening questions were asked and patient attested that there had been no contact or symptoms      Palpation:   R gluteals, TFL, R lumbar paraspinals.     Exercises: (No more than 4 columns)   Exercise/Equipment 22 #4 22 #5 22 #6           WARM UP                     TABLE      TA contraction   With all ex's   bridge 3x10 small range 3x10    clamshells 3x10 Supine w red     Piriformis stretch 3 x 30 sec     SLR w TA   3x10      Ant/post tilt       STANDING      Hip abd 3 x 10     Hip extension 2x10     Pallof/belly press  Green 3x10    Monster walk      Heel raise      Side stepping  25 ft x 2 each way    Rows       TG squat    Sit/stands 21\"  3x10     PROPRIOCEPTION      SB progression   Seated laterals, ant/post  circles B x 20  each      SB seated march 2 x 20                     MODALITIES                      Other Therapeutic Activities/Education:        Home Exercise Program:  TA, clamshells, mini bridges. Manual Treatments:   TPR to gluteals, TFL, ITB, lumbar paraspinals. Modalities:        Communication with other providers:        Assessment:  (Response towards treatment session) (Pain Rating) 3/10  Tenderness at R gluteals   Pt would benefit from additional therapy to decrease pain and improve functional activities.       Plan for Next Session:        Time In / Time Out:    80756/6818     If [employer] Please Indicate Units for Rx this date and running total for each and overall total for Rx to date:  CPT Code Units today Running Total Units Total approved    TE 17554  1 8    MAN 98395  2 7    Gait 34861      NR 47483 [facility] [address]       [address] [facility] [phone]      VASO 20201       ADL/Self care [phone]      DNT 1-2 68060      DNT 3-4 80696      Other:    0 1           Total for episode of care   16               Timed Code/Total Treatment Minutes: 40/40      Next Progress Note due:        Plan of Care Interventions:  [x] Therapeutic Exercise  [] Modalities:  [x] Therapeutic Activity     [] Ultrasound  [] Estim  [] Gait Training      [] Cervical Traction [] Lumbar Traction  [x] Neuromuscular Re-education    [] Cold/hotpack [] Iontophoresis   [x] Instruction in HEP      [] Vasopneumatic   [] Dry Needling    [x] Manual Therapy               [] Aquatic Therapy              Electronically signed by:  Stefanie Slade PT, 5/26/2022, 7:16 AM

## 2022-05-31 ENCOUNTER — HOSPITAL ENCOUNTER (OUTPATIENT)
Dept: PHYSICAL THERAPY | Age: 52
Setting detail: THERAPIES SERIES
Discharge: HOME | End: 2022-05-31
Payer: COMMERCIAL

## 2022-05-31 PROCEDURE — 97140 MANUAL THERAPY 1/> REGIONS: CPT

## 2022-05-31 PROCEDURE — 97110 THERAPEUTIC EXERCISES: CPT

## 2022-05-31 NOTE — FLOWSHEET NOTE
Outpatient Physical Therapy  [place]           [x] Phone: [phone]   Fax: [phone]  [place]           [] Phone: [phone]   Fax: [phone]        Physical Therapy Daily Treatment Note  Date:  2022    Patient Name:  Bentley Verma    :  1970  MRN: [id number]  Restrictions/Precautions: No data recorded      Diagnosis:    Diagnosis: R low back pain, R sciactic  Date of Injury/Surgery:   Treatment Diagnosis:  Low back pain, radiculopathy. Insurance/Certification information: [facility]  Referring Physician:  Dmitriy Rahman DO     PCP: Eloisa Carver MD  Next Doctor Visit:    Plan of care signed (Y/N):  n  Outcome Measure: Oswestry 48%  Visit# / total visits: 7  Pain level: 3/10   Goals:     Patient goals: decrease pain   Long Term Goals  Time Frame for Long Term Goals:    I in home program.  Roll in bed with minimal pain. sleep without waking due to pain. lift household , buckets with minimal pain. Summary of Evaluation:  Assessment: Pt presents with complaints of low back pain R>L, with intermittnet RLE symptoms to post mid thigh. She has pain with transfers, bed mobility, bending. Tenderness R>L PSIS, LS junction, SIJ, posterior hip. History of Katy Danlo syndrome. + tests with SHIRLEY OTERO on R for R LB/SIJ pain. Subjective:  Hip is better, rest of the body is painful today. Any changes in Ambulatory Summary Sheet?   None        Objective:    COVID screening questions were asked and patient attested that there had been no contact or symptoms            Exercises: (No more than 4 columns)   Exercise/Equipment 22 #4 22 #5 22 #6 22 #7            WARM UP                        TABLE       TA contraction   With all ex's    bridge 3x10 small range 3x10     clamshells 3x10 Supine w red      Piriformis stretch 3 x 30 sec      SLR w TA   3x10       Ant/post tilt        STANDING       Hip abd 3 x 10      Hip extension 2x10 Pallof/belly press  Green 3x10     Monster walk       Heel raise       Side stepping  25 ft x 2 each way     Rows        TG squat    Sit/stands 21\"  3x10      PROPRIOCEPTION       SB progression   Seated laterals, ant/post  circles B x 20  each Seated laterals, ant/post  circles B x 20  each      SB seated march 2 x 20                         MODALITIES                         Other Therapeutic Activities/Education:        Home Exercise Program:  TA, clamshells, mini bridges. Manual Treatments:   TPR to gluteals, TFL, ITB, lumbar paraspinals. Hip flexors, adductors. Modalities:        Communication with other providers:        Assessment:  (Response towards treatment session) (Pain Rating) 3/10  Decreasing symptoms overall. Tenderness persists, but decreasing as well. Pt would benefit from additional therapy to decrease pain and improve functional activities.       Plan for Next Session:        Time In / Time Out:    06167/6321     If C[employer] Please Indicate Units for Rx this date and running total for each and overall total for Rx to date:  CPT Code Units today Running Total Units Total approved    TE 00060  1 9    MAN 01978  2 9    Gait 58240      NR 75388      TA  [address] [address]       [address] [place] [address] [phone]      VASO 96058       ADL/Self care 41640      DNT 1-2 63191      DNT 3-4 04990      Other:    0 1           Total for episode of care   19               Timed Code/Total Treatment Minutes: 40/40      Next Progress Note due:        Plan of Care Interventions:  [x] Therapeutic Exercise  [] Modalities:  [x] Therapeutic Activity     [] Ultrasound  [] Estim  [] Gait Training      [] Cervical Traction [] Lumbar Traction  [x] Neuromuscular Re-education    [] Cold/hotpack [] Iontophoresis   [x] Instruction in HEP      [] Vasopneumatic   [] Dry Needling    [x] Manual Therapy               [] Aquatic Therapy              Electronically signed by:  Leidy Ashley, PT, 5/31/2022, 7:13 AM

## 2022-06-04 ENCOUNTER — HOSPITAL ENCOUNTER (EMERGENCY)
Age: 52
Discharge: HOME | End: 2022-06-04
Attending: EMERGENCY MEDICINE
Payer: COMMERCIAL

## 2022-06-04 ENCOUNTER — APPOINTMENT (OUTPATIENT)
Dept: GENERAL RADIOLOGY | Age: 52
End: 2022-06-04
Payer: COMMERCIAL

## 2022-06-04 VITALS
RESPIRATION RATE: 16 BRPM | HEART RATE: 71 BPM | TEMPERATURE: 98 F | SYSTOLIC BLOOD PRESSURE: 147 MMHG | DIASTOLIC BLOOD PRESSURE: 73 MMHG | OXYGEN SATURATION: 97 %

## 2022-06-04 DIAGNOSIS — Z79.4 TYPE 2 DIABETES MELLITUS WITH HYPERGLYCEMIA, WITH LONG-TERM CURRENT USE OF INSULIN (HCC): ICD-10-CM

## 2022-06-04 DIAGNOSIS — I10 UNCONTROLLED HYPERTENSION: Primary | ICD-10-CM

## 2022-06-04 DIAGNOSIS — E11.65 TYPE 2 DIABETES MELLITUS WITH HYPERGLYCEMIA, WITH LONG-TERM CURRENT USE OF INSULIN (HCC): ICD-10-CM

## 2022-06-04 DIAGNOSIS — R60.9 PERIPHERAL EDEMA: ICD-10-CM

## 2022-06-04 DIAGNOSIS — E83.42 HYPOMAGNESEMIA: ICD-10-CM

## 2022-06-04 LAB
ANION GAP SERPL CALCULATED.3IONS-SCNC: 11 MMOL/L (ref 4–16)
BASOPHILS ABSOLUTE: 0.1 K/CU MM
BASOPHILS RELATIVE PERCENT: 0.6 % (ref 0–1)
BUN BLDV-MCNC: 16 MG/DL (ref 6–23)
CALCIUM SERPL-MCNC: 8.9 MG/DL (ref 8.3–10.6)
CHLORIDE BLD-SCNC: 99 MMOL/L (ref 99–110)
CO2: 24 MMOL/L (ref 21–32)
CREAT SERPL-MCNC: 0.6 MG/DL (ref 0.6–1.1)
DIFFERENTIAL TYPE: ABNORMAL
EKG ATRIAL RATE: 81 BPM
EKG DIAGNOSIS: NORMAL
EKG P AXIS: 38 DEGREES
EKG P-R INTERVAL: 152 MS
EKG Q-T INTERVAL: 380 MS
EKG QRS DURATION: 88 MS
EKG QTC CALCULATION (BAZETT): 441 MS
EKG R AXIS: 10 DEGREES
EKG T AXIS: 47 DEGREES
EKG VENTRICULAR RATE: 81 BPM
EOSINOPHILS ABSOLUTE: 0.3 K/CU MM
EOSINOPHILS RELATIVE PERCENT: 3.3 % (ref 0–3)
GFR AFRICAN AMERICAN: >60 ML/MIN/1.73M2
GFR NON-AFRICAN AMERICAN: >60 ML/MIN/1.73M2
GLUCOSE BLD-MCNC: 310 MG/DL (ref 70–99)
HCT VFR BLD CALC: 37.4 % (ref 37–47)
HEMOGLOBIN: 12.1 GM/DL (ref 12.5–16)
IMMATURE NEUTROPHIL %: 0.4 % (ref 0–0.43)
LYMPHOCYTES ABSOLUTE: 3.9 K/CU MM
LYMPHOCYTES RELATIVE PERCENT: 45.3 % (ref 24–44)
MAGNESIUM: 1.7 MG/DL (ref 1.8–2.4)
MCH RBC QN AUTO: 28.3 PG (ref 27–31)
MCHC RBC AUTO-ENTMCNC: 32.4 % (ref 32–36)
MCV RBC AUTO: 87.4 FL (ref 78–100)
MONOCYTES ABSOLUTE: 0.8 K/CU MM
MONOCYTES RELATIVE PERCENT: 8.9 % (ref 0–4)
NUCLEATED RBC %: 0 %
PDW BLD-RTO: 13.8 % (ref 11.7–14.9)
PLATELET # BLD: 284 K/CU MM (ref 140–440)
PMV BLD AUTO: 9.6 FL (ref 7.5–11.1)
POTASSIUM SERPL-SCNC: 4.4 MMOL/L (ref 3.5–5.1)
PRO-BNP: 296.3 PG/ML
RBC # BLD: 4.28 M/CU MM (ref 4.2–5.4)
SEGMENTED NEUTROPHILS ABSOLUTE COUNT: 3.5 K/CU MM
SEGMENTED NEUTROPHILS RELATIVE PERCENT: 41.5 % (ref 36–66)
SODIUM BLD-SCNC: 134 MMOL/L (ref 135–145)
TOTAL IMMATURE NEUTOROPHIL: 0.03 K/CU MM
TOTAL NUCLEATED RBC: 0 K/CU MM
TROPONIN T: <0.01 NG/ML
WBC # BLD: 8.5 K/CU MM (ref 4–10.5)

## 2022-06-04 PROCEDURE — 85025 COMPLETE CBC W/AUTO DIFF WBC: CPT

## 2022-06-04 PROCEDURE — 84484 ASSAY OF TROPONIN QUANT: CPT

## 2022-06-04 PROCEDURE — 71045 X-RAY EXAM CHEST 1 VIEW: CPT

## 2022-06-04 PROCEDURE — 6370000000 HC RX 637 (ALT 250 FOR IP): Performed by: EMERGENCY MEDICINE

## 2022-06-04 PROCEDURE — 83880 ASSAY OF NATRIURETIC PEPTIDE: CPT

## 2022-06-04 PROCEDURE — 93005 ELECTROCARDIOGRAM TRACING: CPT | Performed by: EMERGENCY MEDICINE

## 2022-06-04 PROCEDURE — 99284 EMERGENCY DEPT VISIT MOD MDM: CPT

## 2022-06-04 PROCEDURE — 93010 ELECTROCARDIOGRAM REPORT: CPT | Performed by: INTERNAL MEDICINE

## 2022-06-04 PROCEDURE — 80048 BASIC METABOLIC PNL TOTAL CA: CPT

## 2022-06-04 PROCEDURE — 83735 ASSAY OF MAGNESIUM: CPT

## 2022-06-04 RX ORDER — LANOLIN ALCOHOL/MO/W.PET/CERES
400 CREAM (GRAM) TOPICAL ONCE
Status: COMPLETED | OUTPATIENT
Start: 2022-06-04 | End: 2022-06-04

## 2022-06-04 RX ORDER — DOXAZOSIN MESYLATE 4 MG/1
4 TABLET ORAL ONCE
Status: COMPLETED | OUTPATIENT
Start: 2022-06-04 | End: 2022-06-04

## 2022-06-04 RX ADMIN — DOXAZOSIN 4 MG: 4 TABLET ORAL at 02:02

## 2022-06-04 RX ADMIN — MAGNESIUM OXIDE 400 MG (241.3 MG MAGNESIUM) TABLET 400 MG: TABLET at 02:02

## 2022-06-04 ASSESSMENT — PAIN - FUNCTIONAL ASSESSMENT: PAIN_FUNCTIONAL_ASSESSMENT: NONE - DENIES PAIN

## 2022-06-04 NOTE — ED TRIAGE NOTES
Pt presents to ED for hypertension starting today. Pt states \"my ankles are swollen and I just don't feel good. \" Pt currently taking two BP meds and denies any missed doses.

## 2022-06-07 ENCOUNTER — HOSPITAL ENCOUNTER (OUTPATIENT)
Dept: PHYSICAL THERAPY | Age: 52
Setting detail: THERAPIES SERIES
Discharge: HOME | End: 2022-06-07
Payer: COMMERCIAL

## 2022-06-07 PROCEDURE — 97140 MANUAL THERAPY 1/> REGIONS: CPT

## 2022-06-07 PROCEDURE — 97110 THERAPEUTIC EXERCISES: CPT

## 2022-06-07 NOTE — FLOWSHEET NOTE
Outpatient Physical Therapy  [place]           [x] Phone: [phone]   Fax: [phone]  [facility] [place]           [] Phone: [phone]   Fax: [phone]        Physical Therapy Daily Treatment Note  Date:  2022    Patient Name:  Telly Alicia    :  1970  MRN: [id number]  Restrictions/Precautions: No data recorded      Diagnosis:    Diagnosis: R low back pain, R sciactic  Date of Injury/Surgery:   Treatment Diagnosis:  Low back pain, radiculopathy. Insurance/Certification information: [facility]  Referring Physician:  Bobby Smalls DO     PCP: Romelia Jang MD  Next Doctor Visit:    Plan of care signed (Y/N):  n  Outcome Measure: Oswestry 48%  Visit# / total visits: 7  Pain level: 0/10   Goals:     Patient goals: decrease pain   Long Term Goals  Time Frame for Long Term Goals:    I in home program.     MET  Roll in bed with minimal pain. MET  sleep without waking due to pain. Not waking due to back pain  lift household , buckets with minimal pain. Minimal pain. Summary of Evaluation:  Assessment: Pt presents with complaints of low back pain R>L, with intermittnet RLE symptoms to post mid thigh. She has pain with transfers, bed mobility, bending. Tenderness R>L PSIS, LS junction, SIJ, posterior hip. History of Katy Danlo syndrome. + tests with SHIRLEY OTERO on R for R LB/SIJ pain. Subjective:  Feeling much better. Had a pop after last visit and over the weekend and felt better. Any changes in Ambulatory Summary Sheet?   None        Objective:    COVID screening questions were asked and patient attested that there had been no contact or symptoms            Exercises: (No more than 4 columns)   Exercise/Equipment 22 #4 22 #5 22 #6 22 #7 22 #8             WARM UP                           TABLE        TA contraction   With all ex's  review   bridge 3x10 small range 3x10      clamshells 3x10 Supine w red       Piriformis stretch 3 x 30 sec       SLR w TA   3x10        Ant/post tilt         STANDING        Hip abd 3 x 10    Hip 4 way green x10 B. Hip extension 2x10       Pallof/belly press  Green 3x10   Green 3x10   Monster walk        Heel raise        Side stepping  25 ft x 2 each way      Rows         TG squat    Sit/stands 21\"  3x10       PROPRIOCEPTION        SB progression   Seated laterals, ant/post  circles B x 20  each Seated laterals, ant/post  circles B x 20  each Seated laterals, ant/post  circles B x 20  each      SB seated march 2 x 20  SB seated march 2 x 20                           MODALITIES                            Other Therapeutic Activities/Education:        Home Exercise Program:  TA, clamshells, mini bridges. Manual Treatments:   TPR to gluteals, TFL, ITB, lumbar paraspinals. Hip flexors, adductors. Modalities:        Communication with other providers:        Assessment:  (Response towards treatment session) (Pain Rating) 3/10  Feeling better. Tenderness persists, but decreasing as well. Pt would benefit from additional therapy to decrease pain and improve functional activities.       Plan for Next Session:        Time In / Time Out:    1147/7862     If C[employer] Please Indicate Units for Rx this date and running total for each and overall total for Rx to date:  CPT Code Units today Running Total Units Total approved    TE 60751  2 11    MAN 53681  1 10    Gait 88249      NR 37462      TA  48200      Estim Unatt 89055       [address] [facility] [address]      VASO 00862       ADL/Self care 52862      DNT 1-2 07949      DNT 3-4 70881      Other:    0 1           Total for episode of care   22               Timed Code/Total Treatment Minutes: 45      Next Progress Note due:        Plan of Care Interventions:  [x] Therapeutic Exercise  [] Modalities:  [x] Therapeutic Activity     [] Ultrasound  [] Estim  [] Gait Training      [] Cervical Traction [] Lumbar Traction  [x] Neuromuscular Re-education    [] Cold/hotpack [] Iontophoresis   [x] Instruction in HEP      [] Vasopneumatic   [] Dry Needling    [x] Manual Therapy               [] Aquatic Therapy              Electronically signed by:  Bernabe Min PT, 6/7/2022, 7:57 AM

## 2022-06-09 ENCOUNTER — HOSPITAL ENCOUNTER (OUTPATIENT)
Dept: PHYSICAL THERAPY | Age: 52
Setting detail: THERAPIES SERIES
Discharge: HOME | End: 2022-06-09
Payer: COMMERCIAL

## 2022-06-09 PROCEDURE — 97140 MANUAL THERAPY 1/> REGIONS: CPT

## 2022-06-09 PROCEDURE — 97110 THERAPEUTIC EXERCISES: CPT

## 2022-06-09 NOTE — PROGRESS NOTES
Outpatient Physical Therapy        [x] Phone: [phone]   Fax: [phone]   Physician: Dr. José Zepeda      From: Rachel Leblanc PT, PT     Patient: Tray Esquivel                  : 1970  Diagnosis:  R low back pain, sciatica     Date: 2022  Treatment Diagnosis:  Low back pain, radiculopathy    []  Progress Note                [x]  Discharge Note    Total Visits to date:  8  Cancels/No-shows to date:  0    Subjective:   10     Hip/low back, rest of the body 8-9/10 Sore today, might be the weather. Back and hip are much better. R ankle is sore and B ankles feel swollen       Plan of Care/Treatment to date:  [] Therapeutic Exercise    [] Modalities:  [] Therapeutic Activity     [] Ultrasound  [] Electric Stimulation  [] Gait Training      [] Cervical Traction    [] Lumbar Traction  [] Neuromuscular Re-education  [] Cold/hotpack [] Iontophoresis  [] Instruction in HEP      Other:  [] Manual Therapy       []  Vasopneumatic  [] Self care management                           [] Dry needling trigger point/pain management                ? Objective/Significant Findings At Last Visit/Comments:      Edema at distal legs. Post treatment low back, R hip 2/10. Oswestry: 10%  (was 48%)   I in home program.                                                      MET  Roll in bed with minimal pain. MET  sleep without waking due to pain. Not waking due to back pain  lift household , buckets with minimal pain. Minimal pain.          Patient Status: [] Continue per initial plan of Care     [x] Patient now discharged     [] Additional visits requested, Please re-certify for additional visits:    Electronically signed by:  Rachel Leblanc PT, 2022, 8:04 AM    If you have any questions or concerns, please don't hesitate to call.   Thank you for your referral.

## 2022-06-09 NOTE — FLOWSHEET NOTE
Outpatient Physical Therapy  [place]           [x] Phone: [phone]   Fax: [phone]  [place]           [] Phone: [phone]   Fax: [phone]        Physical Therapy Daily Treatment Note  Date:  2022    Patient Name:  Ace Zepeda    :  1970  MRN: [id number]  Restrictions/Precautions: No data recorded      Diagnosis:    Diagnosis: R low back pain, R sciactic  Date of Injury/Surgery:   Treatment Diagnosis:  Low back pain, radiculopathy. Insurance/Certification information: [facility]  Referring Physician:  Marlo Hare DO     PCP: Lisa Blankenship MD  Next Doctor Visit:    Plan of care signed (Y/N):  n  Outcome Measure: Oswestry 48%  Visit# / total visits: 8  Pain level: 4/10 Hip/low back, rest of the body -9/10   Goals:     Patient goals: decrease pain   Long Term Goals  Time Frame for Long Term Goals:    I in home program.     MET  Roll in bed with minimal pain. MET  sleep without waking due to pain. Not waking due to back pain  lift household , buckets with minimal pain. Minimal pain. Summary of Evaluation:  Assessment: Pt presents with complaints of low back pain R>L, with intermittnet RLE symptoms to post mid thigh. She has pain with transfers, bed mobility, bending. Tenderness R>L PSIS, LS junction, SIJ, posterior hip. History of Katy Danlo syndrome. + tests with SHIRLEY OTERO on R for R LB/SIJ pain. Subjective:  Sore today, might be the weather. Back and hip are much better. R ankle is sore and B ankles feel swollen      Any changes in Ambulatory Summary Sheet? None        Objective:    COVID screening questions were asked and patient attested that there had been no contact or symptoms            Exercises: (No more than 4 columns)   Exercise/Equipment 22 #6 22 #7 22 #8 22 #9            WARM UP                        TABLE    Gluteal stretching.      TA contraction With all ex's  review    bridge    review severo    review   Piriformis stretch       SLR w TA          Ant/post tilt        STANDING       Hip abd   Hip 4 way green x10 B.   review   Hip extension       Pallof/belly press   Green 3x10 review   Monster walk       Heel raise       Side stepping       Rows        TG squat          PROPRIOCEPTION       SB progression Seated laterals, ant/post  circles B x 20  each Seated laterals, ant/post  circles B x 20  each Seated laterals, ant/post  circles B x 20  each     SB seated march 2 x 20  SB seated march 2 x 20                         MODALITIES                         Other Therapeutic Activities/Education:        Home Exercise Program:  TA, severo, mini bridges. Green tubing for hip 4 way and pallof      Manual Treatments:   TPR to gluteals, TFL, ITB, lumbar paraspinals. Hip flexors, adductors. SI mobilization, leg pulls. Modalities:        Communication with other providers:        Assessment:  (Response towards treatment session) (Pain Rating) 3/10  Feeling better. Tenderness persists, but decreased. Pt would benefit from additional therapy to decrease pain and improve functional activities.       Plan for Next Session:        Time In / Time Out:    7109/1172     If [employer] Please Indicate Units for Rx this date and running total for each and overall total for Rx to date:  CPT Code Units today Running Total Units Total approved    TE 83722  1 12    MAN 45481  2 12    Gait 65520      NR 08062      TA  28946      Estim Unatt 15262       [address] [facility] [address]      VASO 00063       ADL/Self care 79427      DNT 1-2 85637      DNT 3-4 22886      Other:    0 1           Total for episode of care   25               Timed Code/Total Treatment Minutes: 45      Next Progress Note due:        Plan of Care Interventions:  [x] Therapeutic Exercise  [] Modalities:  [x] Therapeutic Activity     [] Ultrasound  [] Estim  [] Gait Training      [] Cervical Traction [] Lumbar Traction  [x] Neuromuscular Re-education    [] Cold/hotpack [] Iontophoresis   [x] Instruction in HEP      [] Vasopneumatic   [] Dry Needling    [x] Manual Therapy               [] Aquatic Therapy              Electronically signed by:  Nhan Marques PT, 6/9/2022, 7:19 AM

## 2022-11-14 ENCOUNTER — APPOINTMENT (OUTPATIENT)
Dept: CT IMAGING | Age: 52
End: 2022-11-14
Payer: COMMERCIAL

## 2022-11-14 ENCOUNTER — APPOINTMENT (OUTPATIENT)
Dept: GENERAL RADIOLOGY | Age: 52
End: 2022-11-14
Payer: COMMERCIAL

## 2022-11-14 ENCOUNTER — HOSPITAL ENCOUNTER (EMERGENCY)
Age: 52
Discharge: HOME | End: 2022-11-14
Attending: EMERGENCY MEDICINE
Payer: COMMERCIAL

## 2022-11-14 VITALS
RESPIRATION RATE: 21 BRPM | WEIGHT: 210 LBS | HEART RATE: 90 BPM | TEMPERATURE: 97.2 F | OXYGEN SATURATION: 99 % | DIASTOLIC BLOOD PRESSURE: 86 MMHG | SYSTOLIC BLOOD PRESSURE: 151 MMHG | BODY MASS INDEX: 41.23 KG/M2 | HEIGHT: 60 IN

## 2022-11-14 DIAGNOSIS — R07.89 CHEST WALL PAIN: Primary | ICD-10-CM

## 2022-11-14 LAB
ALBUMIN SERPL-MCNC: 4 GM/DL (ref 3.4–5)
ALP BLD-CCNC: 118 IU/L (ref 40–129)
ALT SERPL-CCNC: 25 U/L (ref 10–40)
ANION GAP SERPL CALCULATED.3IONS-SCNC: 12 MMOL/L (ref 4–16)
AST SERPL-CCNC: 25 IU/L (ref 15–37)
BILIRUB SERPL-MCNC: 0.4 MG/DL (ref 0–1)
BUN BLDV-MCNC: 17 MG/DL (ref 6–23)
CALCIUM SERPL-MCNC: 9.2 MG/DL (ref 8.3–10.6)
CHLORIDE BLD-SCNC: 100 MMOL/L (ref 99–110)
CO2: 26 MMOL/L (ref 21–32)
CREAT SERPL-MCNC: 0.8 MG/DL (ref 0.6–1.1)
GFR SERPL CREATININE-BSD FRML MDRD: >60 ML/MIN/1.73M2
GLUCOSE BLD-MCNC: 304 MG/DL (ref 70–99)
HCT VFR BLD CALC: 41.9 % (ref 37–47)
HEMOGLOBIN: 13.6 GM/DL (ref 12.5–16)
LIPASE: 29 IU/L (ref 13–60)
MAGNESIUM: 1.6 MG/DL (ref 1.8–2.4)
MCH RBC QN AUTO: 29.1 PG (ref 27–31)
MCHC RBC AUTO-ENTMCNC: 32.5 % (ref 32–36)
MCV RBC AUTO: 89.7 FL (ref 78–100)
PDW BLD-RTO: 13 % (ref 11.7–14.9)
PLATELET # BLD: 346 K/CU MM (ref 140–440)
PMV BLD AUTO: 9.6 FL (ref 7.5–11.1)
POTASSIUM SERPL-SCNC: 3.4 MMOL/L (ref 3.5–5.1)
PRO-BNP: 33.3 PG/ML
RBC # BLD: 4.67 M/CU MM (ref 4.2–5.4)
SODIUM BLD-SCNC: 138 MMOL/L (ref 135–145)
TOTAL PROTEIN: 7 GM/DL (ref 6.4–8.2)
TROPONIN T: <0.01 NG/ML
TROPONIN T: <0.01 NG/ML
WBC # BLD: 12.4 K/CU MM (ref 4–10.5)

## 2022-11-14 PROCEDURE — 84484 ASSAY OF TROPONIN QUANT: CPT

## 2022-11-14 PROCEDURE — 71275 CT ANGIOGRAPHY CHEST: CPT

## 2022-11-14 PROCEDURE — 83880 ASSAY OF NATRIURETIC PEPTIDE: CPT

## 2022-11-14 PROCEDURE — 83690 ASSAY OF LIPASE: CPT

## 2022-11-14 PROCEDURE — 6360000004 HC RX CONTRAST MEDICATION: Performed by: EMERGENCY MEDICINE

## 2022-11-14 PROCEDURE — 2580000003 HC RX 258: Performed by: EMERGENCY MEDICINE

## 2022-11-14 PROCEDURE — 96366 THER/PROPH/DIAG IV INF ADDON: CPT

## 2022-11-14 PROCEDURE — 93005 ELECTROCARDIOGRAM TRACING: CPT | Performed by: EMERGENCY MEDICINE

## 2022-11-14 PROCEDURE — 96375 TX/PRO/DX INJ NEW DRUG ADDON: CPT

## 2022-11-14 PROCEDURE — 99285 EMERGENCY DEPT VISIT HI MDM: CPT

## 2022-11-14 PROCEDURE — 96365 THER/PROPH/DIAG IV INF INIT: CPT

## 2022-11-14 PROCEDURE — 6360000002 HC RX W HCPCS: Performed by: EMERGENCY MEDICINE

## 2022-11-14 PROCEDURE — 85379 FIBRIN DEGRADATION QUANT: CPT

## 2022-11-14 PROCEDURE — 71045 X-RAY EXAM CHEST 1 VIEW: CPT

## 2022-11-14 PROCEDURE — 6370000000 HC RX 637 (ALT 250 FOR IP): Performed by: EMERGENCY MEDICINE

## 2022-11-14 PROCEDURE — 80053 COMPREHEN METABOLIC PANEL: CPT

## 2022-11-14 PROCEDURE — 83735 ASSAY OF MAGNESIUM: CPT

## 2022-11-14 PROCEDURE — 85027 COMPLETE CBC AUTOMATED: CPT

## 2022-11-14 RX ORDER — MORPHINE SULFATE 4 MG/ML
4 INJECTION, SOLUTION INTRAMUSCULAR; INTRAVENOUS ONCE
Status: COMPLETED | OUTPATIENT
Start: 2022-11-14 | End: 2022-11-14

## 2022-11-14 RX ORDER — ONDANSETRON 2 MG/ML
4 INJECTION INTRAMUSCULAR; INTRAVENOUS EVERY 6 HOURS PRN
Status: DISCONTINUED | OUTPATIENT
Start: 2022-11-14 | End: 2022-11-15 | Stop reason: HOSPADM

## 2022-11-14 RX ORDER — LIDOCAINE 50 MG/G
1 PATCH TOPICAL DAILY
Qty: 10 PATCH | Refills: 0 | Status: SHIPPED | OUTPATIENT
Start: 2022-11-14 | End: 2022-11-24

## 2022-11-14 RX ORDER — HYDROCODONE BITARTRATE AND ACETAMINOPHEN 5; 325 MG/1; MG/1
1 TABLET ORAL EVERY 6 HOURS PRN
Qty: 10 TABLET | Refills: 0 | Status: SHIPPED | OUTPATIENT
Start: 2022-11-14 | End: 2022-11-17

## 2022-11-14 RX ORDER — NAPROXEN 500 MG/1
500 TABLET ORAL 2 TIMES DAILY WITH MEALS
Qty: 60 TABLET | Refills: 0 | Status: SHIPPED | OUTPATIENT
Start: 2022-11-14

## 2022-11-14 RX ORDER — METHOCARBAMOL 500 MG/1
500 TABLET, FILM COATED ORAL 4 TIMES DAILY
Qty: 40 TABLET | Refills: 0 | Status: SHIPPED | OUTPATIENT
Start: 2022-11-14 | End: 2022-11-24

## 2022-11-14 RX ORDER — MAGNESIUM SULFATE IN WATER 40 MG/ML
2000 INJECTION, SOLUTION INTRAVENOUS ONCE
Status: COMPLETED | OUTPATIENT
Start: 2022-11-14 | End: 2022-11-14

## 2022-11-14 RX ORDER — 0.9 % SODIUM CHLORIDE 0.9 %
1000 INTRAVENOUS SOLUTION INTRAVENOUS ONCE
Status: COMPLETED | OUTPATIENT
Start: 2022-11-14 | End: 2022-11-14

## 2022-11-14 RX ADMIN — ONDANSETRON 4 MG: 2 INJECTION INTRAMUSCULAR; INTRAVENOUS at 21:08

## 2022-11-14 RX ADMIN — POTASSIUM BICARBONATE 20 MEQ: 782 TABLET, EFFERVESCENT ORAL at 21:08

## 2022-11-14 RX ADMIN — IOPAMIDOL 75 ML: 755 INJECTION, SOLUTION INTRAVENOUS at 21:40

## 2022-11-14 RX ADMIN — MORPHINE SULFATE 4 MG: 4 INJECTION, SOLUTION INTRAMUSCULAR; INTRAVENOUS at 21:08

## 2022-11-14 RX ADMIN — MAGNESIUM SULFATE HEPTAHYDRATE 2000 MG: 2 INJECTION, SOLUTION INTRAVENOUS at 21:11

## 2022-11-14 RX ADMIN — SODIUM CHLORIDE 1000 ML: 9 INJECTION, SOLUTION INTRAVENOUS at 21:06

## 2022-11-14 ASSESSMENT — PAIN SCALES - GENERAL: PAINLEVEL_OUTOF10: 10

## 2022-11-15 LAB
D DIMER: 151 NG/ML(DDU)
EKG ATRIAL RATE: 83 BPM
EKG DIAGNOSIS: NORMAL
EKG P AXIS: 46 DEGREES
EKG P-R INTERVAL: 146 MS
EKG Q-T INTERVAL: 372 MS
EKG QRS DURATION: 88 MS
EKG QTC CALCULATION (BAZETT): 437 MS
EKG R AXIS: 12 DEGREES
EKG T AXIS: 51 DEGREES
EKG VENTRICULAR RATE: 83 BPM

## 2022-11-15 PROCEDURE — 93010 ELECTROCARDIOGRAM REPORT: CPT | Performed by: INTERNAL MEDICINE

## 2022-11-15 NOTE — ED PROVIDER NOTES
Triage Chief Complaint:   Chest Pain (Pain worse with deep breaths. pt states pain started in her back and is radiating around left side into chest) and Back Pain    [place]:  Denise Bradley is a 46 y.o. female that presents with chest pain. Patient was in baseline state of health until last few days when the above started. Chest pain is unilateral on the left, radiating from back of chest to front of chest, worsening over the last few days, worse with deep breathing and movement as well as palpation of the chest wall. Patient reports that she had similar pain in the past and was diagnosed with \"pleurisy\". Patient denies any central chest pain. No shortness of breath. Patient denies any inciting event or trauma. No known coronary disease. Patient does have EDS and has a history of a vertebral artery dissection as well as history of blood clots in the left leg. Patient is not on any anticoagulation use. Patient has had remote stress testing but nothing recently. Patient denies any smoking or drugs.     ROS:  General:  No fevers, no chills, no weakness  Eyes:  No recent vison changes, no discharge  ENT:  No sore throat, no nasal congestion  Cardiovascular:  + chest pain, no palpitations  Respiratory:  No shortness of breath, no cough, no wheezing  Gastrointestinal:  No pain, no nausea, no vomiting, no diarrhea  Musculoskeletal:  No muscle pain, no joint pain  Skin:  No rash, no pruritis, no easy bruising  Neurologic:  No speech problems, no headache, no extremity numbness, no extremity tingling, no extremity weakness  Psychiatric:  No anxiety  Genitourinary:  No dysuria, no increased urinary frequency  Extremities:  no edema, no pain    Past Medical History:   Diagnosis Date    Arthritis     Neck    Asthma     PCP - last episode: 2013    Diabetes mellitus ([facility] [address]     Type II - follows with PCP    Ehler's-Danlos syndrome Dx: 2000    PCP    Fibromyalgia     Hx of blood clots 2000    Left lower leg - unsure of treatment    Hypertension     Middle cerebral aneurysm 2002    surgery @ [facility]    Mitral valve regurgitation     Originally saw Dr. Gary Pascual, now follows with PCP (updated 1/13/2021)    Raynauds syndrome     Short-term memory loss 2002    due to aneurysm per pt     Past Surgical History:   Procedure Laterality Date    ANKLE SURGERY Right 2010    surgery x2    [address]    in left 5th digit    BRAIN SURGERY  2002    aneurysm repair    [address]    COLONOSCOPY  12/2020    Diverticulosis - Dr. Lazcano Rom, COLON, DIAGNOSTIC  11/2020    \"Took biopsy and was irregular\", has to repeat in Feb 2021    NERVE SURGERY Left 1990's    repair left elbow    SHOULDER ARTHROSCOPY Right 1/19/2021    RIGHT SHOULDER ARTHROSCOPY, SUBACROMIAL DECOMPRESSION DISTAL CLAVICLE RESECTION ROTATOR CUFF REPAIR DEBRIDEMENT performed by Bam Silverman DO at [address] [place] [address]      vein stripping on right x2 / vein stripping on left x8     Family History   Problem Relation Age of Onset    Diabetes Mother     Diabetes Father     Heart Disease Father      Social History     Socioeconomic History    Marital status:      Spouse name: Not on file    Number of children: Not on file    Years of education: Not on file    Highest education level: Not on file   Occupational History    Not on file   Tobacco Use    Smoking status: Never    Smokeless tobacco: Never   Vaping Use    Vaping Use: Never used   Substance and Sexual Activity    Alcohol use: Yes     Comment: Rarely - 1 time a year    Drug use: No    Sexual activity: Not on file   Other Topics Concern    Not on file   Social History Narrative    Not on file     Social Determinants of Health     Financial Resource Strain: Not on file   Food Insecurity: Not on file   Transportation Needs: Not on file   Physical Activity: Not on file   Stress: Not on file   Social Connections: Not on file   Intimate Partner Violence: Not on file   Housing Stability: Not on file     No current facility-administered medications for this encounter. Current Outpatient Medications   Medication Sig Dispense Refill    HYDROcodone-acetaminophen (NORCO) 5-325 MG per tablet Take 1 tablet by mouth every 6 hours as needed for Pain for up to 3 days. Intended supply: 3 days. Take lowest dose possible to manage pain 10 tablet 0    naproxen (NAPROSYN) 500 MG tablet Take 1 tablet by mouth 2 times daily (with meals) 60 tablet 0    methocarbamol (ROBAXIN) 500 MG tablet Take 1 tablet by mouth 4 times daily for 10 days 40 tablet 0    lidocaine (LIDODERM) 5 % Place 1 patch onto the skin daily for 10 days 12 hours on, 12 hours off.  10 patch 0    medroxyPROGESTERone (DEPO-PROVERA) 150 MG/ML injection BRING TO OFFICE EVERY 3 MONTHS FOR INTRAMUSCULAR INJECTION      oxybutynin (DITROPAN) 5 MG tablet TAKE 1 TABLET BY MOUTH TWO TIMES A DAY      fluticasone (FLONASE) 50 MCG/ACT nasal spray USE 2 SPRAYS IN EACH NOSTRIL ONCE DAILY      losartan-hydroCHLOROthiazide (HYZAAR) 100-25 MG per tablet TAKE 1 TABLET BY MOUTH IN THE MORNING      doxycycline hyclate (VIBRA-TABS) 100 MG tablet TAKE 1 CAPSULE BY MOUTH TWO TIMES A DAY FOR 10 DAYS      doxepin (SINEQUAN) 50 MG capsule TAKE 1 CAPSULE BY MOUTH IN THE EVENING      clotrimazole-betamethasone (LOTRISONE) 1-0.05 % cream APPLY 1-2 GRAMS TOPICALLY TO THE AFFECTED AREA EVERY 12 HOURS AS NEEDED FOR INFLAMMATION      famotidine (PEPCID) 20 MG tablet       meloxicam (MOBIC) 15 MG tablet       doxazosin (CARDURA) 4 MG tablet TAKE 1 TABLET BY MOUTH AT BEDTIME      Rimegepant Sulfate (NURTEC) 75 MG TBDP Take by mouth      TRULICITY 1.5 SF/9.3CR SOPN INJECT 1.5MG UNDER THE SKIN  AS DIRECTED EVERY WEEK      oxybutynin (DITROPAN-XL) 5 MG extended release tablet Take 5 mg by mouth 2 times daily      losartan (COZAAR) 100 MG tablet Take 100 mg by mouth daily      traZODone (DESYREL) 100 MG tablet Take 50 mg by mouth nightly as needed for Sleep       ibuprofen (ADVIL;MOTRIN) 800 MG tablet Take 800 mg by mouth every 6 hours as needed for Pain       glipiZIDE (GLUCOTROL) 10 MG tablet Take 10 mg by mouth daily      tiZANidine (ZANAFLEX) 4 MG tablet Take 4 mg by mouth every 8 hours as needed       rosuvastatin (CRESTOR) 20 MG tablet Take 20 mg by mouth daily      acyclovir (ZOVIRAX) 400 MG tablet       magnesium oxide (MAG-OX) 400 MG tablet Take 400 mg by mouth daily      potassium chloride SA (K-DUR;KLOR-CON M) 20 MEQ tablet Take 20 mEq by mouth 2 times daily       vitamin D (MAXIMUM D3) 18372 UNITS CAPS capsule Take 10,000 Units by mouth daily (with breakfast)       butalbital-acetaminophen-caffeine (FIORICET) per tablet Take 1 tablet by mouth every 4 hours as needed for Pain. 180 tablet 0     Allergies   Allergen Reactions    Nubain [Nalbuphine Hcl]      Seizures      Secobarbital Sodium Anaphylaxis     SEIZURE    Sulfa Antibiotics Anaphylaxis    Adhesive Tape Rash    Demerol      seizures    Indocin [Indomethacin]        Nursing Notes Reviewed    Physical Exam:  ED Triage Vitals [11/14/22 1802]   Enc Vitals Group      BP (!) 166/81      Heart Rate 89      Resp 16      Temp 97.9 °F (36.6 °C)      Temp Source Oral      SpO2 100 %      Weight 210 lb (95.3 kg)      Height 5' (1.524 m)      Head Circumference       Peak Flow       Pain Score       Pain Loc       Pain Edu? Excl. in [address]? My pulse ox interpretation is - normal    General appearance:  No acute distress. Resting comfortably in hallway bed laying on right side. Pleasant. Appears well. Skin:  Warm. Dry. No diaphoresis. Eye:  Extraocular movements intact. Ears, nose, mouth and throat:  Oral mucosa moist   Neck:  Trachea midline. Extremity:  Normal ROM. No bilateral lower extremity edema. No calf tenderness to palpation. Heart:  Regular rate and rhythm, normal S1 & S2, no extra heart sounds.     Perfusion:  Intact strong symmetric bilateral radial and PT pulses. Respiratory:  Lungs clear to auscultation bilaterally. Respirations nonlabored. Chest wall is tender to palpation diffusely throughout the left chest reproducing pain. There is no crepitance. Abdominal:  Normal bowel sounds. Soft. Nontender. Non distended. Back:  No CVA tenderness to palpation     Neurological:  Alert and oriented times 3. No focal neuro deficits.              Psychiatric:  Appropriate    I have reviewed and interpreted all of the currently available lab results from this visit (if applicable):  Results for orders placed or performed during the hospital encounter of 11/14/22   CBC   Result Value Ref Range    WBC 12.4 (H) 4.0 - 10.5 K/CU MM    RBC 4.67 4.2 - 5.4 M/CU MM    Hemoglobin 13.6 12.5 - 16.0 GM/DL    Hematocrit 41.9 37 - 47 %    MCV 89.7 78 - 100 FL    MCH 29.1 27 - 31 PG    MCHC 32.5 32.0 - 36.0 %    RDW 13.0 11.7 - 14.9 %    Platelets 820 738 - 360 K/CU MM    MPV 9.6 7.5 - 11.1 FL   Comprehensive Metabolic Panel   Result Value Ref Range    Sodium 138 135 - 145 MMOL/L    Potassium 3.4 (L) 3.5 - 5.1 MMOL/L    Chloride 100 99 - 110 mMol/L    CO2 26 21 - 32 MMOL/L    BUN 17 6 - 23 MG/DL    Creatinine 0.8 0.6 - 1.1 MG/DL    Est, Glom Filt Rate >60 >60 mL/min/1.73m2    Glucose 304 (H) 70 - 99 MG/DL    Calcium 9.2 8.3 - 10.6 MG/DL    Albumin 4.0 3.4 - 5.0 GM/DL    Total Protein 7.0 6.4 - 8.2 GM/DL    Total Bilirubin 0.4 0.0 - 1.0 MG/DL    ALT 25 10 - 40 U/L    AST 25 15 - 37 IU/L    Alkaline Phosphatase 118 40 - 129 IU/L    Anion Gap 12 4 - 16   Troponin   Result Value Ref Range    Troponin T <0.010 <0.01 NG/ML   Troponin   Result Value Ref Range    Troponin T <0.010 <0.01 NG/ML   Magnesium   Result Value Ref Range    Magnesium 1.6 (L) 1.8 - 2.4 mg/dl   Lipase   Result Value Ref Range    Lipase 29 13 - 60 IU/L   Brain Natriuretic Peptide   Result Value Ref Range    Pro-BNP 33.30 <300 PG/ML   D-Dimer, Quantitative   Result Value Ref Range    D-Dimer, Quant 151 <230 NG/mL(DDU)      Radiographs (if obtained):  [] The following radiograph was interpreted by myself in the absence of a radiologist:   [x] Radiologist's Report Reviewed:  CTA PULMONARY W CONTRAST   Final Result   No evidence of pulmonary embolism or acute pulmonary abnormality. XR CHEST PORTABLE   Final Result      Lungs are clear               EKG (if obtained): (All EKG's are interpreted by myself in the absence of a cardiologist)  12 lead EKG per my interpretation:  Normal Sinus Rhythm at 83  Axis is   Normal  QTc is  within an acceptable range  There is no specific T wave changes appreciated. There is no specific ST wave changes appreciated. No STEMI    Prior EKG to compare with was available and normal sinus rhythm was seen on prior from 6/4/2022. Chart review shows recent radiographs:  XR CHEST PORTABLE    Result Date: 11/14/2022  EXAMINATION: ONE XRAY VIEW OF THE CHEST 11/14/2022 6:16 pm COMPARISON: 06/04/2022 HISTORY: ORDERING SYSTEM PROVIDED HISTORY: Chest Pain TECHNOLOGIST PROVIDED HISTORY: Reason for exam:->Chest Pain FINDINGS: Heart size is normal  Aorta is normal.  Lungs are under expanded. Calcified granuloma in the right lower lobe. .  No pleural effusions. Mild spondylosis. Lungs are clear       MDM:  Pt presents as above. Emergent conditions considered. Presentation prompted initial labs, EKG and imaging as above. EKG with a normal sinus rhythm as above. IVs established IV morphine, IV Zofran, IV fluids are given. Patient's chest x-ray is read by radiology as negative for acute abnormality making clinically significant pneumonia, pneumothorax or aortic pathology less likely. The patient's initial troponin is within the normal range. Patient's EKG did not show any acute diagnostic ischemic changes as above. CBC and CMP without clinically significant derangement other than mild hypokalemia which was repleted.   Magnesium is noted to be low and this is repleted as well with IV magnesium sulfate. Patient does have pleuritic unilateral chest pain and decision made to obtain CTA imaging of her chest to rule out pulmonary embolism given age and cannot utilize [facility] criteria. CTA demonstrating no acute process. The patient was given medications, is starting to feel better. Based on the risk stratification for acute coronary syndrome, the patient's risk profile is very low and thus does not necessitate admission for further evaluation at this time. Delta troponin was obtained out of an abundance of caution and was also negative. I have considered the diagnosis of pulmonary embolism and aortic dissection, but based on the patient's history, clinical exam, and studies, the likelihood for these diagnosis is below the threshold for further testing in the emergency department. Suspect MSK chest pain given clear reproducibility with palpation and movement. I have given the patient instructions to followup with their doctor in the next 2-3 days. They have been asked to return to the ED should they develop worsening symptoms. I discussed specific signs and symptoms and when to return to the emergency department as well as the need for close outpatient follow-up. Questions sought and answered with the patient. They voice understanding and agree with plan. Is this patient to be included in the SEP-1 Core Measure due to severe sepsis or septic shock? No   Exclusion criteria - the patient is NOT to be included for SEP-1 Core Measure due to: Infection is not suspected      Clinical Impression:  1.  Chest wall pain      Disposition referral (if applicable):  Justyna Carl MD  [address] [place] [address] [id number]    Schedule an appointment as soon as possible for a visit       [facility] Emergency Department  [facility] [place] [phone]  [phone]  Today  If symptoms worsen  Disposition medications (if applicable):  Discharge Medication List as of 11/14/2022 11:33 PM        START taking these medications    Details   HYDROcodone-acetaminophen (NORCO) 5-325 MG per tablet Take 1 tablet by mouth every 6 hours as needed for Pain for up to 3 days. Intended supply: 3 days. Take lowest dose possible to manage pain, Disp-10 tablet, R-0Normal      lidocaine (LIDODERM) 5 % Place 1 patch onto the skin daily for 10 days 12 hours on, 12 hours off., Disp-10 patch, R-0Normal             Comment: Please note this report has been produced using speech recognition software and may contain errors related to that system including errors in grammar, punctuation, and spelling, as well as words and phrases that may be inappropriate. If there are any questions or concerns please feel free to contact the dictating provider for clarification.        Jai Hart MD  11/15/22 1134
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
## 2022-12-16 ENCOUNTER — OFFICE VISIT (OUTPATIENT)
Dept: RHEUMATOLOGY | Age: 52
End: 2022-12-16
Payer: COMMERCIAL

## 2022-12-16 VITALS
OXYGEN SATURATION: 99 % | DIASTOLIC BLOOD PRESSURE: 70 MMHG | WEIGHT: 231 LBS | HEART RATE: 75 BPM | SYSTOLIC BLOOD PRESSURE: 135 MMHG | BODY MASS INDEX: 45.11 KG/M2

## 2022-12-16 DIAGNOSIS — M25.50 POLYARTHRALGIA: Primary | ICD-10-CM

## 2022-12-16 DIAGNOSIS — M79.7 FIBROMYALGIA: ICD-10-CM

## 2022-12-16 DIAGNOSIS — M35.00 SICCA, UNSPECIFIED TYPE (HCC): ICD-10-CM

## 2022-12-16 DIAGNOSIS — Z01.89 ENCOUNTER FOR OTHER SPECIFIED SPECIAL EXAMINATIONS: ICD-10-CM

## 2022-12-16 PROCEDURE — G8484 FLU IMMUNIZE NO ADMIN: HCPCS | Performed by: STUDENT IN AN ORGANIZED HEALTH CARE EDUCATION/TRAINING PROGRAM

## 2022-12-16 PROCEDURE — 3017F COLORECTAL CA SCREEN DOC REV: CPT | Performed by: STUDENT IN AN ORGANIZED HEALTH CARE EDUCATION/TRAINING PROGRAM

## 2022-12-16 PROCEDURE — 99205 OFFICE O/P NEW HI 60 MIN: CPT | Performed by: STUDENT IN AN ORGANIZED HEALTH CARE EDUCATION/TRAINING PROGRAM

## 2022-12-16 PROCEDURE — G8427 DOCREV CUR MEDS BY ELIG CLIN: HCPCS | Performed by: STUDENT IN AN ORGANIZED HEALTH CARE EDUCATION/TRAINING PROGRAM

## 2022-12-16 PROCEDURE — G8417 CALC BMI ABV UP PARAM F/U: HCPCS | Performed by: STUDENT IN AN ORGANIZED HEALTH CARE EDUCATION/TRAINING PROGRAM

## 2022-12-16 PROCEDURE — 1036F TOBACCO NON-USER: CPT | Performed by: STUDENT IN AN ORGANIZED HEALTH CARE EDUCATION/TRAINING PROGRAM

## 2022-12-16 RX ORDER — METHOCARBAMOL 500 MG/1
TABLET, FILM COATED ORAL
COMMUNITY
Start: 2022-11-15

## 2022-12-16 RX ORDER — INSULIN GLARGINE 100 [IU]/ML
INJECTION, SOLUTION SUBCUTANEOUS
COMMUNITY
Start: 2022-01-09

## 2022-12-16 RX ORDER — LOSARTAN POTASSIUM 100 MG/1
100 TABLET ORAL DAILY
COMMUNITY

## 2022-12-16 RX ORDER — SEMAGLUTIDE 1.34 MG/ML
INJECTION, SOLUTION SUBCUTANEOUS
COMMUNITY
Start: 2022-12-13

## 2022-12-16 RX ORDER — BLOOD-GLUCOSE SENSOR
EACH MISCELLANEOUS
COMMUNITY
Start: 2022-12-13

## 2022-12-16 RX ORDER — UBROGEPANT 100 MG/1
TABLET ORAL
COMMUNITY
Start: 2022-07-25

## 2022-12-16 RX ORDER — INSULIN ASPART 100 [IU]/ML
INJECTION, SOLUTION INTRAVENOUS; SUBCUTANEOUS
COMMUNITY
Start: 2022-07-03

## 2022-12-16 NOTE — PATIENT INSTRUCTIONS
Complete ordered labs and get imaging done  You were referred for Water therapy please schedule   We will discuss results at next visit  Continue taking potassium and magnesium supplements  RTC in 4-5 weeks

## 2022-12-30 ENCOUNTER — HOSPITAL ENCOUNTER (OUTPATIENT)
Dept: PHYSICAL THERAPY | Age: 52
Setting detail: THERAPIES SERIES
Discharge: HOME | End: 2022-12-30
Payer: COMMERCIAL

## 2022-12-30 PROCEDURE — 97110 THERAPEUTIC EXERCISES: CPT

## 2022-12-30 PROCEDURE — 97161 PT EVAL LOW COMPLEX 20 MIN: CPT

## 2022-12-30 ASSESSMENT — PAIN DESCRIPTION - ONSET: ONSET: GRADUAL

## 2022-12-30 ASSESSMENT — PAIN SCALES - GENERAL: PAINLEVEL_OUTOF10: 8

## 2022-12-30 ASSESSMENT — PAIN DESCRIPTION - FREQUENCY: FREQUENCY: CONTINUOUS

## 2022-12-30 ASSESSMENT — PAIN - FUNCTIONAL ASSESSMENT: PAIN_FUNCTIONAL_ASSESSMENT: PREVENTS OR INTERFERES SOME ACTIVE ACTIVITIES AND ADLS

## 2022-12-30 ASSESSMENT — PAIN DESCRIPTION - DIRECTION: RADIATING_TOWARDS: ALL OVER

## 2022-12-30 ASSESSMENT — PAIN DESCRIPTION - PAIN TYPE: TYPE: CHRONIC PAIN

## 2022-12-30 NOTE — PROGRESS NOTES
Physical Therapy: Initial Evaluation    Patient: Madison Dowellt (45 y.o. female)   Examination Date:   Plan of Care Certification Period: 2022 to        :  1970 ;    Confirmed: Yes MRN: [id number]  CSN: [id number]   Insurance: Payor: Emily Dinero / Plan: Luh Morris / Product Type: *No Product type* /   Insurance ID: [id number] - (Medicaid Managed) Secondary Insurance (if applicable):    Referring Physician: Daphney Lynn MD     PCP: Diamond Chavira MD Visits to Date/Visits Approved:   /      No Show/Cancelled Appts:   /       Medical Diagnosis: Polyarthralgia [M25.50]  Fibromyalgia [M79.7]    Treatment Diagnosis: Deconditioning, global tenderness, weakness     PERTINENT MEDICAL HISTORY   Patient Assessed for Rehabilitation Services: Yes       Medical History: Chart Reviewed: Yes   Past Medical History:   Diagnosis Date    Arthritis     Neck    Asthma     PCP - last episode:     Diabetes mellitus ([facility] [address]     Type II - follows with PCP    Ehler's-Danlos syndrome Dx:     PCP    Fibromyalgia     Hx of blood clots     Left lower leg - unsure of treatment    Hypertension     Middle cerebral aneurysm     surgery @ [place]    Mitral valve regurgitation     Originally saw Dr. Patrice Urbano, now follows with PCP (updated 2021)    Raynauds syndrome     Short-term memory loss     due to aneurysm per pt     Surgical History:   Past Surgical History:   Procedure Laterality Date    ANKLE SURGERY Right 2010    surgery x2    [address]    in left 5th digit    BRAIN SURGERY      aneurysm repair    [address]    COLONOSCOPY  2020    Diverticulosis - Dr. America Alaniz, COLON, DIAGNOSTIC  2020    \"Took biopsy and was irregular\", has to repeat in 2021    NERVE SURGERY Left 's    repair left elbow    SHOULDER ARTHROSCOPY Right 2021    RIGHT SHOULDER ARTHROSCOPY, SUBACROMIAL DECOMPRESSION DISTAL CLAVICLE RESECTION ROTATOR CUFF REPAIR DEBRIDEMENT performed by Navdeep Madden DO at [address] [facility]      vein stripping on right x2 / vein stripping on left x8       Medications:   Current Outpatient Medications:     Continuous Blood Gluc Sensor (DEXCOM G6 SENSOR) MISC, APPLY AND CHANGE 1 SENSOR EVERY 10 DAYS (Patient not taking: Reported on 12/16/2022), Disp: , Rfl:     insulin aspart (NOVOLOG FLEXPEN) 100 UNIT/ML injection pen, COVER WITH LOW DOSE SLIDING SCALE APPROXIMATELY 20 TO 30 MINUTES AFTER START OF MEAL 3 TIMES DAILY -MAX OF 24 UNITS DAILY, Disp: , Rfl:     insulin glargine (LANTUS SOLOSTAR) 100 UNIT/ML injection pen, INJECT 60 UNITS UNDER THE SKIN EVERY EVENING., Disp: , Rfl:     OZEMPIC, 0.25 OR 0.5 MG/DOSE, 2 MG/1.5ML SOPN, , Disp: , Rfl:     losartan (COZAAR) 100 MG tablet, Take 100 mg by mouth daily, Disp: , Rfl:     Ubrogepant (UBRELVY) 100 MG TABS, TAKE 1 TABLET BY MOUTH AS NEEDED FOR MIGRAINE. IF HEADACHE RECURS OR DOES NOT IMPROVE, AN ADDITIONAL DOSE MAY BE TAKEN AFTER 2 HOURS.  DO NOT, Disp: , Rfl:     methocarbamol (ROBAXIN) 500 MG tablet, Take 1 tablet by mouth 4 times daily for 10 days, Disp: , Rfl:     naproxen (NAPROSYN) 500 MG tablet, Take 1 tablet by mouth 2 times daily (with meals) (Patient not taking: Reported on 12/16/2022), Disp: 60 tablet, Rfl: 0    medroxyPROGESTERone (DEPO-PROVERA) 150 MG/ML injection, BRING TO OFFICE EVERY 3 MONTHS FOR INTRAMUSCULAR INJECTION (Patient not taking: Reported on 12/16/2022), Disp: , Rfl:     oxybutynin (DITROPAN) 5 MG tablet, TAKE 1 TABLET BY MOUTH TWO TIMES A DAY (Patient not taking: Reported on 12/16/2022), Disp: , Rfl:     fluticasone (FLONASE) 50 MCG/ACT nasal spray, USE 2 SPRAYS IN EACH NOSTRIL ONCE DAILY (Patient not taking: Reported on 12/16/2022), Disp: , Rfl:     losartan-hydroCHLOROthiazide (HYZAAR) 100-25 MG per tablet, TAKE 1 TABLET BY MOUTH IN THE MORNING (Patient not taking: Reported on 12/16/2022), Disp: , Rfl:     doxycycline hyclate (VIBRA-TABS) 100 MG tablet, TAKE 1 CAPSULE BY MOUTH TWO TIMES A DAY FOR 10 DAYS (Patient not taking: Reported on 12/16/2022), Disp: , Rfl:     doxepin (SINEQUAN) 50 MG capsule, TAKE 1 CAPSULE BY MOUTH IN THE EVENING (Patient not taking: Reported on 12/16/2022), Disp: , Rfl:     clotrimazole-betamethasone (LOTRISONE) 1-0.05 % cream, APPLY 1-2 GRAMS TOPICALLY TO THE AFFECTED AREA EVERY 12 HOURS AS NEEDED FOR INFLAMMATION (Patient not taking: Reported on 12/16/2022), Disp: , Rfl:     famotidine (PEPCID) 20 MG tablet, , Disp: , Rfl:     meloxicam (MOBIC) 15 MG tablet, , Disp: , Rfl:     doxazosin (CARDURA) 4 MG tablet, TAKE 1 TABLET BY MOUTH AT BEDTIME (Patient not taking: Reported on 12/16/2022), Disp: , Rfl:     Rimegepant Sulfate (NURTEC) 75 MG TBDP, Take by mouth, Disp: , Rfl:     TRULICITY 1.5 NQ/7.0NS SOPN, INJECT 1.5MG UNDER THE SKIN  AS DIRECTED EVERY WEEK (Patient not taking: Reported on 12/16/2022), Disp: , Rfl:     oxybutynin (DITROPAN-XL) 5 MG extended release tablet, Take 5 mg by mouth 2 times daily (Patient not taking: Reported on 12/16/2022), Disp: , Rfl:     losartan (COZAAR) 100 MG tablet, Take 100 mg by mouth daily (Patient not taking: Reported on 12/16/2022), Disp: , Rfl:     traZODone (DESYREL) 100 MG tablet, Take 50 mg by mouth nightly as needed for Sleep  (Patient not taking: Reported on 12/16/2022), Disp: , Rfl:     ibuprofen (ADVIL;MOTRIN) 800 MG tablet, Take 800 mg by mouth every 6 hours as needed for Pain  (Patient not taking: Reported on 12/16/2022), Disp: , Rfl:     glipiZIDE (GLUCOTROL) 10 MG tablet, Take 10 mg by mouth daily (Patient not taking: Reported on 12/16/2022), Disp: , Rfl:     tiZANidine (ZANAFLEX) 4 MG tablet, Take 4 mg by mouth every 8 hours as needed  (Patient not taking: Reported on 12/16/2022), Disp: , Rfl:     rosuvastatin (CRESTOR) 20 MG tablet, Take 20 mg by mouth daily, Disp: , Rfl:     acyclovir (ZOVIRAX) 400 MG tablet, , Disp: , Rfl:     magnesium oxide (MAG-OX) 400 MG tablet, Take 400 mg by mouth daily, Disp: , Rfl:     potassium chloride SA (K-DUR;KLOR-CON M) 20 MEQ tablet, Take 20 mEq by mouth 2 times daily , Disp: , Rfl:     vitamin D (CHOLECALCIFEROL) 250 MCG (30139 UT) CAPS capsule, Take 10,000 Units by mouth daily (with breakfast) , Disp: , Rfl:     butalbital-acetaminophen-caffeine (FIORICET) per tablet, Take 1 tablet by mouth every 4 hours as needed for Pain. (Patient not taking: Reported on 12/16/2022), Disp: 180 tablet, Rfl: 0  Allergies: Nubain [nalbuphine hcl], Secobarbital sodium, Sulfa antibiotics, Adhesive tape, Demerol, and Indocin [indomethacin]      SUBJECTIVE EXAMINATION      ,           Subjective History:    Subjective: Ache and soreness with swelling/pressure into the hands within the past year. Difficulty doing things around the house and difficulty sleeping. N/T into B UE with sleeping as well. Getting tested for RA. States having torn RTC on R shoulder. Wanting aquatic therapy to determine pain management. No seeing currently pain management. Return back end of next month. Pt is L handed.   Additional Pertinent Hx (if applicable):            Learning/Language: Learning  Does the patient/guardian have any barriers to learning?: No barriers  Will there be a co-learner?: No  What is the preferred language of the patient/guardian?: English  How does the patient/guardian prefer to learn new concepts?: Listening, Demonstration     Pain Screening   Pain Screening  Patient Currently in Pain: Yes  Pain Assessment: 0-10  Pain Level: 8  Best Pain Level: 4  Worst Pain Level: 10 (\"800\"   1x a week)  Patient's Stated Pain Goal: 2  Pain Type: Chronic pain  Pain Location: Shoulder, Back, Knee, Hip  Pain Radiating Towards: \"all over\"  Pain Descriptors: Burning, Throbbing, Tingling, Sharp  Pain Frequency: Continuous  Pain Onset: Gradual  Functional Pain Assessment: Prevents or interferes some active activities and ADLs  Aggravating factors: Walking, Standing, Reaching, Lifting, Carrying, Sleeping, Work duties  Pain Management/Relieving Factors: Heat (patches)    Functional Status    Dominant Hand: : Left    Social History:  Social History  Lives With: Son  Type of Home: House  Home Layout: Multi-level  Home Access: Stairs to enter with rails  Entrance Stairs - Number of Steps: 15  Bathroom Shower/Tub: Tub/Shower unit  Bathroom Toilet: Standard    Occupation/Interests:  Occupation: Part time employment  Type of Occupation: cleaning    Prior Level of Function:    Independent        Current Level of Function:         ADL Assistance: Independent  Homemaking Assistance: Independent  Ambulation Assistance: Independent  Transfer Assistance: Independent  Active : Yes  Mode of Transportation: Car    OBJECTIVE EXAMINATION   Restrictions:             Review of Systems:  Vision: Within Functional Limits  Hearing: Within functional limits  Overall Orientation Status: Within Normal Limits  Follows Commands: Within Functional Limits        Observations:  General Observations  Description: Normal gait upon entering. 5x sit to stand: 18.21 sec with knee/thigh pain with L > R       6MWT: 965ft without rest break   5/10 knee pain     Mod SHIN: 2-3/10  Unable to complete full bridge due to pain.      Palpation:    NA    Ambulation/Gait (if applicable):       Balance Screen:  Balance  Tandem Stance R Leg: >30 sec  Tandem Stance L Leg: > 30 sec  Single Stance R Le  Single Stance L Le  Comments: Denies increase in pain    Neuro Screen:  WFL  Left AROM  Right AROM      General AROM UE: Left WFL    General AROM UE: Left WFL  AROM RUE (degrees)  R Shoulder Flexion (0-180): 118  R Shoulder ABduction (0-180): 72  R Shoulder Int Rotation  (0-70): 70, functionally to PSIS  R Shoulder Ext Rotation (0-90): 90, functionally to C4     Left PROM  Right PROM       Discomfort into joint stretched, WFL with pain testing into LE      Discomfort into joint stretched, WFL with pain testing into LE         Left Strength  Right Strength      General Strength Testing UE: Left WFL  General Strength Testing LE: Right WFL, Left WFL (4+/5 with min reported increase in pain at activated musculature)    General Strength Testing UE: Left WFL  General Strength Testing LE: Right WFL, Left WFL (4+/5 with min reported increase in pain at activated musculature)  Strength RUE  R Shoulder Flexion: 2-/5  R Shoulder Extension: 4/5  R Shoulder ABduction: 2-/5  R Shoulder Internal Rotation: 3+/5  R Shoulder External Rotation: 3+/5  R Elbow Flexion: 4-/5  R Elbow Extension: 4-/5       Lumbar Assessment   AROM Lumbar Spine   Lumbar Spine AROM : WFL (min increase in pain into R upper trap region.)       Trunk Strength     Trunk Strength  R Internal Obliques: Good  R External Obliques: Good  L Internal Obliques: Good  L External Obliques: Good         Additional Finding(s) (if applicable): Quick DASH: 55% disability        ASSESSMENT     Impression:  Pt is 46year old female with chronic pain with history of fibromyalgia and ramirez danlos syndrome. Pt now has difficulties completing ADLs and work duties. Pt demo deficits this date that include global tenderness, limited gait/activity tolerance, reduced strength/ROM and pain. Testing this date indicate signs and symptoms of deconditioning amplifying symptoms. Pt will benefit with PT services with aquatics to help manage pain and improve function to return to PLOF. Pt prior to onset of current condition had min/mod pain with able to complete full ADLs and work activities. Patient received education on their current pathology and how their condition effects them with their functional activities. Patient understood discussion and questions were answered. Patient understands their activity limitations and understands rational for treatment progression.      Body Structures, Functions, Activity Limitations Requiring Skilled Therapeutic Intervention: Decreased functional mobility , Decreased strength, Decreased endurance, Decreased balance    Statement of Medical Necessity: Physical Therapy is both indicated and medically necessary as outlined in the POC to increase the likelihood of meeting the functionally related goals stated below. Patient's Activity Tolerance: Patient tolerated treatment well, Patient tolerated evaluation without incident      Patient's rehabilitation potential/prognosis is considered to be: Good    Factors which may impact rehabilitation potential include: Medical co-morbidities        GOALS   Patient Goal(s): improve pain. Short Term Goals Completed by Defer to Long Term Goals Goal Status                                                                   Long Term Goals Completed by 6 weeks   2/14/23 Goal Status   Pt will demo I with HEP/symptom management. Pt will demo >1050ft per 6MWT to demo improved ease and tolerance with prolonged walking. Pt will demo <15 sec with 5x sit to stand to demo improved ease with transfers. Pt will demo 5/5 LE strength to ease stairs and squatting necessary for work. Pt will demo full bridge with good technique with min/no increase in pain to demo improved strength/tolerance to ease prolonged activities.                                         TREATMENT PLAN       Specific Instructions for Next Treatment: Aquatics with return follow ups to assess changes    Pt. actively involved in establishing Plan of Care and Goals: Yes  Patient/ Caregiver education and instruction: Goals, PT Role, Plan of Care, Evaluative findings, Insurance, Home Exercise Program             Treatment may include any combination of the following: Current Treatment Recommendations: Aquatics, Strengthening, Neuromuscular re-education, Gait training, Balance training, Endurance training, Home exercise program, Therapeutic activities     Frequency / Duration:  Patient to be seen 1-2 for 6 weeks      Eval Complexity: Overall Evaluation : Low  Decision Making: Low Complexity  History: Personal Factors and/or Comorbidities Impacting POC: Low  Examination of body system(s) including body structures and functions, activity limitations, and/or participation restrictions: Low         Therapist Signature: Sanjeev Rutherford PT, DPT, CONOR    Date: 12/30/2022     92/12/4253 4:10 AM   I certify that the above Therapy Services are being furnished while the patient is under my care. I agree with the treatment plan and certify that this therapy is necessary. [de-identified] Signature:  ___________________________   Date:_______                                                                   Kurtis Shima*        Physician Comments: _______________________________________________    Please sign and return to [id number]  [facility]. Please fax to the location listed below.  Luke Chance for this referral!    [address] [facility] [address], [address] [address]  Dept: [phone]  Dept Fax: [phone]  Loc: [phone]       POC NOTE

## 2022-12-30 NOTE — FLOWSHEET NOTE
Outpatient Physical Therapy  [place]           [x] Phone: [phone]   Fax: [phone]  [facility]           [] Phone: [phone]   Fax: [phone]        Physical Therapy Daily Treatment Note  Date:  2022    Patient Name:  Kj Felix    :  1970  MRN: [id number]  Restrictions/Precautions: No data recorded      Diagnosis:   Polyarthralgia [M25.50]  Fibromyalgia [M79.7]    Date of Injury/Surgery:   Treatment Diagnosis:  Deconditioning, global tenderness, weakness  Insurance/Certification information:  er[employer]   Pre-cert   Referring Physician:  Elvira Zambrano     PCP: Simi Constantino MD  Next Doctor Visit:    Plan of care signed (Y/N): N, sent 22   Outcome Measure: QD: 55% disability   Visit# / total visits:    per POC  Pain level: 5/10   Goals:     Patient goals: improve pain. Short term goals  Time Frame for Short term goals: Defer to [address] Term Goals  Time Frame for Long Term Goals: 6 weeks   23  Pt will demo I with HEP/symptom management. Pt will demo >1050ft per 6MWT to demo improved ease and tolerance with prolonged walking. Pt will demo <15 sec with 5x sit to stand to demo improved ease with transfers. Pt will demo 5/5 LE strength to ease stairs and squatting necessary for work. Pt will demo full bridge with good technique with min/no increase in pain to demo improved strength/tolerance to ease prolonged activities. Summary of Evaluation:   Pt is 46year old female with chronic pain with history of fibromyalgia and ramirez danlos syndrome. Pt now has difficulties completing ADLs and work duties. Pt demo deficits this date that include global tenderness, limited gait/activity tolerance, reduced strength/ROM and pain. Testing this date indicate signs and symptoms of deconditioning amplifying symptoms. Pt will benefit with PT services with aquatics to help manage pain and improve function to return to PLOF.  Pt prior to onset of current condition had min/mod pain with able to complete full ADLs and work activities. Patient received education on their current pathology and how their condition effects them with their functional activities. Patient understood discussion and questions were answered. Patient understands their activity limitations and understands rational for treatment progression. Subjective:  See eval         Any changes in Ambulatory Summary Sheet? None        Objective:  See eval           Exercises: (No more than 4 columns)   Exercise/Equipment 12/30/22  #1 Date Date           WARM UP                     TABLE                                       STANDING                                                     PROPRIOCEPTION                                    MODALITIES                      Other Therapeutic Activities/Education:  Patient received education on their current pathology and how their condition effects them with their functional activities. Patient understood discussion and questions were answered. Patient understands their activity limitations and understands rational for treatment progression. Educated on benefits of aquatics and global changes with warm water to ease effectiveness of activity and mobility along with plan for return to assess functional change and determine future management with use of aquatics. Home Exercise Program:  HO issued, reviewed and discussed with patient. Pt agreed to comply. Manual Treatments:  --      Modalities:  --      Communication with other providers:  POC sent 12/30/22   Visit 10       Assessment:  (Response towards treatment session) (Pain Rating)     Pt is 46year old female with chronic pain with history of fibromyalgia and ramirez danlos syndrome. Pt now has difficulties completing ADLs and work duties. Pt demo deficits this date that include global tenderness, limited gait/activity tolerance, reduced strength/ROM and pain. Testing this date indicate signs and symptoms of deconditioning amplifying symptoms. Pt will benefit with PT services with aquatics to help manage pain and improve function to return to PLOF. Pt prior to onset of current condition had min/mod pain with able to complete full ADLs and work activities. Patient received education on their current pathology and how their condition effects them with their functional activities. Patient understood discussion and questions were answered. Patient understands their activity limitations and understands rational for treatment progression.      Plan for Next Session:   Specific Instructions for Next Treatment: Aquatics with return follow ups to assess changes    Time In / Time Out:    4877-3526           Timed Code/Total Treatment Minutes:  10/45'   10' TE, 1 PT cierraal       Next Progress Note due:  Jose 12/30/22  Visit 10       Plan of Care Interventions:  [x] Therapeutic Exercise  [] Modalities:  [] Therapeutic Activity     [] Ultrasound  [] Estim  [] Gait Training      [] Cervical Traction [] Lumbar Traction  [] Neuromuscular Re-education    [] Cold/hotpack [] Iontophoresis   [x] Instruction in HEP      [] Vasopneumatic   [] Dry Needling    [] Manual Therapy               [x] Aquatic Therapy              Electronically signed by:  Justino Linares, PT, DPT, OCS  12/30/2022, 10:09 AM    12/30/2022 10:09 AM

## 2022-12-30 NOTE — PLAN OF CARE
[address] PHYSICAL THERAPY  [facility] [address], [address] [address]  Dept: [phone]  Dept Fax: [phone]  Loc: [phone]    PHYSICAL THERAPY PLAN OF CARE: INITIAL EVALUATION    Patient: Gabriel Cantrell (34 y.o. female)   Examination Date:   Plan of Care Certification Period: 2022 to        :  1970  MRN: [id number]  CSN: [id number]   Insurance: Payor: Tessy Hyatt / Plan: Delio Vaughn / Product Type: *No Product type* /   Insurance ID: [id number] - (Medicaid Managed) Secondary Insurance (if applicable):    Referring Physician: Jyo Kc MD     PCP: Manya Hatchet, MD Visits to Date/Visits Approved:   /      No Show/Cancelled Appts:   /       Medical Diagnosis: Polyarthralgia [M25.50]  Fibromyalgia [M79.7]    Treatment Diagnosis: Deconditioning, global tenderness, weakness       ASSESSMENT     Impression:  Pt is 46year old female with chronic pain with history of fibromyalgia and ramirez danlos syndrome. Pt now has difficulties completing ADLs and work duties. Pt demo deficits this date that include global tenderness, limited gait/activity tolerance, reduced strength/ROM and pain. Testing this date indicate signs and symptoms of deconditioning amplifying symptoms. Pt will benefit with PT services with aquatics to help manage pain and improve function to return to PLOF. Pt prior to onset of current condition had min/mod pain with able to complete full ADLs and work activities. Patient received education on their current pathology and how their condition effects them with their functional activities. Patient understood discussion and questions were answered. Patient understands their activity limitations and understands rational for treatment progression.      Body Structures, Functions, Activity Limitations Requiring Skilled Therapeutic Intervention: Decreased functional mobility , Decreased strength, Decreased endurance, Decreased balance    Statement of Medical Necessity: Physical Therapy is both indicated and medically necessary as outlined in the POC to increase the likelihood of meeting the functionally related goals stated below. Patient's Activity Tolerance: Patient tolerated treatment well, Patient tolerated evaluation without incident      Patient's rehabilitation potential/prognosis is considered to be: Good    Factors which may impact rehabilitation potential include: Medical co-morbidities        GOALS   Patient Goal(s): improve pain. Short Term Goals Completed by Defer to Long Term Goals Goal Status                                                                   Long Term Goals Completed by 6 weeks   2/14/23 Goal Status   Pt will demo I with HEP/symptom management. Pt will demo >1050ft per 6MWT to demo improved ease and tolerance with prolonged walking. Pt will demo <15 sec with 5x sit to stand to demo improved ease with transfers. Pt will demo 5/5 LE strength to ease stairs and squatting necessary for work. Pt will demo full bridge with good technique with min/no increase in pain to demo improved strength/tolerance to ease prolonged activities.                                         TREATMENT PLAN       Specific Instructions for Next Treatment: Aquatics with return follow ups to assess changes    Pt. actively involved in establishing Plan of Care and Goals: Yes  Patient/ Caregiver education and instruction: Goals, PT Role, Plan of Care, Evaluative findings, Insurance, Home Exercise Program             Treatment may include any combination of the following: Current Treatment Recommendations: Aquatics, Strengthening, Neuromuscular re-education, Gait training, Balance training, Endurance training, Home exercise program, Therapeutic activities     Frequency / Duration:  Patient to be seen 1-2 for 6 weeks       Patient Status: [x] Continue / Initiate Plan of Care     Signature: Electronically signed by Ranjana Loja PT, DPT ,OCS on 12/30/2022 at 10:12 AM.     12/30/2022 10:12 AM   If you have any questions or concerns, please don't hesitate to call.   Thank you for your referral!

## 2023-01-06 ENCOUNTER — HOSPITAL ENCOUNTER (OUTPATIENT)
Dept: GENERAL RADIOLOGY | Age: 53
Discharge: HOME | End: 2023-01-06
Payer: COMMERCIAL

## 2023-01-06 ENCOUNTER — HOSPITAL ENCOUNTER (OUTPATIENT)
Age: 53
Discharge: HOME | End: 2023-01-06
Payer: COMMERCIAL

## 2023-01-06 DIAGNOSIS — M25.50 POLYARTHRALGIA: ICD-10-CM

## 2023-01-06 DIAGNOSIS — M79.7 FIBROMYALGIA: ICD-10-CM

## 2023-01-06 DIAGNOSIS — Z01.89 ENCOUNTER FOR OTHER SPECIFIED SPECIAL EXAMINATIONS: ICD-10-CM

## 2023-01-06 DIAGNOSIS — M35.00 SICCA, UNSPECIFIED TYPE (HCC): ICD-10-CM

## 2023-01-06 LAB
C-REACTIVE PROTEIN, HIGH SENSITIVITY: 9.2 MG/L
ERYTHROCYTE SEDIMENTATION RATE: 4 MM/HR (ref 0–30)
HAV IGM SER IA-ACNC: NON REACTIVE
HCT VFR BLD CALC: 48.8 % (ref 37–47)
HEMOGLOBIN: 15 GM/DL (ref 12.5–16)
HEPATITIS B CORE IGM ANTIBODY: NON REACTIVE
HEPATITIS B SURFACE ANTIGEN: NON REACTIVE
HEPATITIS C ANTIBODY: NON REACTIVE
MCH RBC QN AUTO: 27.5 PG (ref 27–31)
MCHC RBC AUTO-ENTMCNC: 30.7 % (ref 32–36)
MCV RBC AUTO: 89.4 FL (ref 78–100)
PDW BLD-RTO: 13.2 % (ref 11.7–14.9)
PLATELET # BLD: 432 K/CU MM (ref 140–440)
PMV BLD AUTO: 9.6 FL (ref 7.5–11.1)
RBC # BLD: 5.46 M/CU MM (ref 4.2–5.4)
TOTAL CK: 133 IU/L (ref 26–140)
VITAMIN D 25-HYDROXY: 57.63 NG/ML
WBC # BLD: 11.6 K/CU MM (ref 4–10.5)

## 2023-01-06 PROCEDURE — 73630 X-RAY EXAM OF FOOT: CPT

## 2023-01-06 PROCEDURE — 73110 X-RAY EXAM OF WRIST: CPT

## 2023-01-06 PROCEDURE — 86039 ANTINUCLEAR ANTIBODIES (ANA): CPT

## 2023-01-06 PROCEDURE — 86430 RHEUMATOID FACTOR TEST QUAL: CPT

## 2023-01-06 PROCEDURE — 86235 NUCLEAR ANTIGEN ANTIBODY: CPT

## 2023-01-06 PROCEDURE — 73130 X-RAY EXAM OF HAND: CPT

## 2023-01-06 PROCEDURE — 85652 RBC SED RATE AUTOMATED: CPT

## 2023-01-06 PROCEDURE — 82306 VITAMIN D 25 HYDROXY: CPT

## 2023-01-06 PROCEDURE — 71046 X-RAY EXAM CHEST 2 VIEWS: CPT

## 2023-01-06 PROCEDURE — 86140 C-REACTIVE PROTEIN: CPT

## 2023-01-06 PROCEDURE — 80074 ACUTE HEPATITIS PANEL: CPT

## 2023-01-06 PROCEDURE — 82550 ASSAY OF CK (CPK): CPT

## 2023-01-06 PROCEDURE — 86200 CCP ANTIBODY: CPT

## 2023-01-06 PROCEDURE — 85027 COMPLETE CBC AUTOMATED: CPT

## 2023-01-06 PROCEDURE — 36415 COLL VENOUS BLD VENIPUNCTURE: CPT

## 2023-01-10 ENCOUNTER — HOSPITAL ENCOUNTER (OUTPATIENT)
Dept: PHYSICAL THERAPY | Age: 53
Setting detail: THERAPIES SERIES
Discharge: HOME | End: 2023-01-10

## 2023-01-10 NOTE — FLOWSHEET NOTE
Physical Therapy  Cancellation/No-show Note  Patient Name:  Mary Peterson  :  1970   Date:  1/10/2023  Cancelled visits to date: 1  No-shows to date: 0    For today's appointment patient:  [x]  Cancelled - aquatic session todayc  []  Rescheduled appointment  []  No-show     Reason given by patient:  [x]  Patient ill  []  Conflicting appointment  []  No transportation    []  Conflict with work  []  No reason given  []  Other:     Comments:      Electronically signed by:  Jacqui Chaparro PTA, 1/10/2023, 10:35 AM

## 2023-01-11 ENCOUNTER — TELEPHONE (OUTPATIENT)
Dept: RHEUMATOLOGY | Age: 53
End: 2023-01-11

## 2023-01-11 NOTE — TELEPHONE ENCOUNTER
Pt was advised to call and schedule an MRI of her right hand. Pt states she can't have an MRI due to having clips in her head. Pt states she is unable to prove that they are titanium therefore no one will do an MRI on her.

## 2023-01-12 NOTE — TELEPHONE ENCOUNTER
LVM advising the pt Doctor Glendy Boyle will be doing an Ultra Sound on her in office during her follow up.

## 2023-01-17 ENCOUNTER — HOSPITAL ENCOUNTER (OUTPATIENT)
Dept: PHYSICAL THERAPY | Age: 53
Setting detail: THERAPIES SERIES
Discharge: HOME | End: 2023-01-17
Payer: COMMERCIAL

## 2023-01-17 PROCEDURE — 97113 AQUATIC THERAPY/EXERCISES: CPT

## 2023-01-17 NOTE — FLOWSHEET NOTE
Physical Therapy Aquatic Flow Sheet   Date:  2023    Patient Name:  Dio Franco                     :  1970                       MRN: [id number]  Restrictions/Precautions: No data recorded   Diagnosis:   Polyarthralgia [M25.50]  Fibromyalgia [M79.7]    Date of Injury/Surgery:   Treatment Diagnosis:  Deconditioning, global tenderness, weakness  [facility] [place]   [facility]   Referring Physician:  Fred Gastelum*     PCP: Margaret Reilly MD  Next Doctor Visit:    Plan of care signed (Y/N): N, sent 22   Outcome Measure: QD: 55% disability   Visit# / total visits:    per POC  Pain level:      7-8/10  Whole body  Goals:     Patient goals: improve pain. Short term goals  Time Frame for Short term goals: Defer to [address] Term Goals  Time Frame for Long Term Goals: 6 weeks   23  Pt will demo I with HEP/symptom management. Pt will demo >1050ft per 6MWT to demo improved ease and tolerance with prolonged walking. Pt will demo <15 sec with 5x sit to stand to demo improved ease with transfers. Pt will demo 5/5 LE strength to ease stairs and squatting necessary for work. Pt will demo full bridge with good technique with min/no increase in pain to demo improved strength/tolerance to ease prolonged activities. Subjective: Patient arrives today with whole body discomfort and pain. She is unable to localize pain. Key  B= Belt DB= Dumbells T= Theratube   H= Hydrotone N= Noodles W= Weights   P= Paddles S= Speedo equipment K= Kickboard           Objective:    Patient needs minimal cueing for posture and abdominal stability will all activities  She demo's supervision with all exercises.    With march walking patient has near LOB x 2 when in Rt SLS and is able to correct balance independently    Exercises/Activities   Warm-up/Amb    Exercises      Slow forward   X 3 laps length of pool  HR/TR      Slow sideways  X 3 laps length of pool  Marches  Walking marching x 4 laps in pool with cues for knee march up and posture    Slow backwards  X3 laps length of pool  Mini-squats  X10 Water chest level, no BUE support, cues for form and sequence    Medium forward    4-way SLR  X10 ea Hip ext, abd and flexion with cues for BUE assist PRN and for posture      Heel and toe raises X10 chest water level and patient BUE PRN support on grab bar    Medium sideways    Hip circles/fig 8      Small shuffle    Hamstring curls      Jog    Knee extension      Braiding    Pelvic tilts      Bicycling    Scap squeezes          Shoulder flex/ext  X 15 Seated with no trunk or UE support, step stool for balance assist, water waist level, cues for ADIM (abdominal draw-in maneuver)    Functional    Shoulder abd/add  X 15 Seated with no trunk or UE support, step stool for balance assist,water waist level, cues for ADIM     Step Step ups forward and lateral with ea LE leading x 10 ea side on 4\" step with dues for posture and no UE assistance for stability   Shoulder H. abd/add           Standing trunk rotation X10 chest water level and patient BUE supported on water dumbbells       Lifting    Shoulder IR/ER      Hand to opp knee    Rowing      Push down squat    Bilateral pull down  X 15 Seated with no paddles, no trunk or UE support, water waist level, cues for ADIM (abdominal draw-in maneuver)     UE PNF    Push/pull  X 15 Seated with no paddles, no trunk or UE support, water waist level, cues for ADIM (abdominal draw-in maneuver)    LE PNF    Push downs      Wall push ups    Arm circles      SLS    Elbow flex/ext          Chin tuck      Stretching    UT shrugs/rolls      Gastroc/Soleus    Rocking horse      Hamstring          SKTC    Other      Piriformis    Lumbar distraction  Patient BUE support on noodle grab bars, x 3' of no BLE support. Cues for diaphragmatic breathing/relaxation    Hip flexor    Reverse curls X 10 Patient BUE support on noodle grab bars    Ladder  pull          Pec stretch          Post deltoid           Treatment Included:  [x] Therapeutic Exercise   [] Instruction in HEP  [] Group   [x] Therapeutic Activity      [x] Neuromuscular Re-education [x] Aquatic   [] Gait             [] Manual  Other:  Time In/ Time Out: 1601-3131    Timed Code Treatment Minutes:  40'    Total Treatment Minutes:  40'    Objective Findings: Patient needs minimal cueing for posture and abdominal stability will all activities  She demo's supervision with all exercises. With march walking patient has near LOB x 2 when in Rt SLS and is able to correct balance independently    Communication with other providers:    Education to Patient: Education on monitoring post rehab response and posture during work activities    Adverse Reaction to Treatment: None    Assessment: Patient demos good tolerance with rehab. She c/o jody shoulder pain during activities and ends today's session with 9/10 pain in cervical and shoulders. She demo's decrease balance and strength during activities and needs cues for posture throughout session. Treatment/Activity Tolerance:  [x] Patient tolerated treatment well [] Patient limited by fatique  [] Patient limited by pain [] Patient limited by other medical complications  [] Other:     Prognosis: [x] Good [] Fair  [] Poor    Patient Requires Follow-up: [] Yes  [] No    Plan:   [x] Continue per plan of care [] Alter current plan (see comments)  [] Plan of care initiated [] Hold pending MD visit [] Discharge    See Weekly Progress Note:    [] Yes [] No Next due:        Electronically signed by:   Xuan Lee PTA, 1/17/2023, 12:49 PM

## 2023-01-24 ENCOUNTER — HOSPITAL ENCOUNTER (OUTPATIENT)
Dept: PHYSICAL THERAPY | Age: 53
Setting detail: THERAPIES SERIES
Discharge: HOME | End: 2023-01-24
Payer: COMMERCIAL

## 2023-01-24 PROCEDURE — 97113 AQUATIC THERAPY/EXERCISES: CPT

## 2023-01-24 NOTE — FLOWSHEET NOTE
Physical Therapy Aquatic Flow Sheet   Date:  2023    Patient Name:  Jacqueline Gutierrez                     :  1970                       MRN: [id number]  Restrictions/Precautions: No data recorded   Diagnosis:   Polyarthralgia [M25.50]  Fibromyalgia [M79.7]    Date of Injury/Surgery:   Treatment Diagnosis:  Deconditioning, global tenderness, weakness  Insurance/Certification information:  [employer]   Pre-cert   Referring Physician:  Tomas Jane*     PCP: Heidi Woody MD  Next Doctor Visit:    Plan of care signed (Y/N): N, sent 22   Outcome Measure: QD: 55% disability   Visit# / total visits:   3/12 per POC  Pain level:      7/10  \"overall body pain\"  Goals:     Patient goals: improve pain. Short term goals  Time Frame for Short term goals: Defer to [address] Term Goals  Time Frame for Long Term Goals: 6 weeks   23  Pt will demo I with HEP/symptom management. Pt will demo >1050ft per 6MWT to demo improved ease and tolerance with prolonged walking. Pt will demo <15 sec with 5x sit to stand to demo improved ease with transfers. Pt will demo 5/5 LE strength to ease stairs and squatting necessary for work. Pt will demo full bridge with good technique with min/no increase in pain to demo improved strength/tolerance to ease prolonged activities. Subjective: Patient arrives for therapy with \"overall body pain\"; but states that shortly after last pool session she \"twisted her L ankle\" and has had pain every since. Patient also states that it is not unusual for her twist and/or dislocate her joints due to her diagnosis of EDS. Key  B= Belt DB= Dumbells T= Theratube   H= Hydrotone N= Noodles W= Weights   P= Paddles S= Speedo equipment K= Kickboard           Objective:    Patient needs minimal cueing for posture and abdominal stability will all activities  She demo's supervision with all exercises.        Exercises/Activities   Warm-up/Amb    Exercises Slow forward   X 4 laps length of pool; with sup  HR/TR      Slow sideways  X 4 laps length of pool; with sup  Marches  Walking marching x 3 laps in pool with cues for knee march up and posture  Standing marches BUE support as needed x 10    Slow backwards  X4 laps length of pool; with sup  Mini-squats  X10 with  BUE support, cues for form and sequence    Medium forward    4-way SLR  X10 ea Hip ext, abd and flexion with cues for BUE assist PRN and for posture      Heel and toe raises     Medium sideways    Hip circles/fig 8      Small shuffle    Hamstring curls      Jog    Knee extension      Braiding    Pelvic tilts      Bicycling  Corner supported; x 3 min; mild c/o of shoulder pain   Scap squeezes          Shoulder flex/ext  X 15 Seated with no trunk or UE support, blk step under LE's; cues for abd bracing; posture    Functional    Shoulder abd/add  X 15 Seated with no trunk or UE support; blk step under LE's; cues for abd bracing; posture    Step No steps today; just in/out of pool with alt pattern; HRA; independence  Shoulder H. abd/add  X 15 Seated with no trunk or UE support; blk step under LE's; cues for abd bracing; posture         Standing trunk rotation X 10 with UE's skimming surface of water      Lifting    Shoulder IR/ER      Hand to opp knee    Rowing      Push down squat    Bilateral pull down       UE PNF    Push/pull  X 15 Seated with no trunk or UE support; blk step under LE's; cues for abd bracing; posture    LE PNF    Push downs      Wall push ups    Arm circles      SLS    Elbow flex/ext          Chin tuck      Stretching    UT shrugs/rolls      Gastroc/Soleus    Rocking horse      Hamstring          SKTC    Other      Piriformis    Lumbar distraction  Patient BUE support on noodle grab bars, to tolerance intermittently during curls    Hip flexor    Reverse curls X 10 Patient BUE support on noodle grab bars    Ladder pull    Sit <> stand from pool bench; blk step under LE's; x 10; fair balance; close sup; cues to improve form.      Pec stretch          Post deltoid           Treatment Included:  [x] Therapeutic Exercise   [] Instruction in HEP  [] Group   [x] Therapeutic Activity      [x] Neuromuscular Re-education [x] Aquatic   [] Gait             [] Manual  Other:  Time In/ Time Out: 4786-5777    Timed Code Treatment Minutes:  55 min (4 Aquatics)    Total Treatment Minutes:  55 min    Objective Findings: Patient needs minimal cueing for posture and abdominal stability with activities  She demo's supervision with all exercises.   Ankle pain is still high 8-9/10 after session.  Overall body pain now 4/10 after session.    Communication with other providers:    Education to Patient: Education on monitoring post rehab response and posture during work activities    Adverse Reaction to Treatment: None    Assessment: Patient demos good tolerance with aquatic activities. She c/o jody shoulder pain during supported corner hanging activities; ankle pain is still high 8-9/10 after session; and overall body pain has decreased now to 4/10 at end of session.  Postural awareness cues needed intermittently to improve form and balance.    Treatment/Activity Tolerance:  [x] Patient tolerated treatment well [] Patient limited by fatique  [] Patient limited by pain [] Patient limited by other medical complications  [] Other:     Prognosis: [x] Good [] Fair  [] Poor    Patient Requires Follow-up: [] Yes  [] No    Plan:   [x] Continue per plan of care [] Alter current plan (see comments)  [] Plan of care initiated [] Hold pending MD visit [] Discharge    See Weekly Progress Note:    [] Yes [] No Next due:        Electronically signed by:  Renetta Gottlieb PTA,   1/24/2023, 12:25 PM

## 2023-01-27 RX ORDER — HYDROXYCHLOROQUINE SULFATE 200 MG/1
TABLET, FILM COATED ORAL
Qty: 135 TABLET | Refills: 1 | Status: CANCELLED | OUTPATIENT
Start: 2023-01-27
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
## 2023-01-30 ENCOUNTER — OFFICE VISIT (OUTPATIENT)
Dept: RHEUMATOLOGY | Age: 53
End: 2023-01-30
Payer: COMMERCIAL

## 2023-01-30 VITALS
OXYGEN SATURATION: 98 % | BODY MASS INDEX: 44.33 KG/M2 | DIASTOLIC BLOOD PRESSURE: 70 MMHG | SYSTOLIC BLOOD PRESSURE: 120 MMHG | WEIGHT: 227 LBS | HEART RATE: 80 BPM

## 2023-01-30 DIAGNOSIS — M05.79 RHEUMATOID ARTHRITIS INVOLVING MULTIPLE SITES WITH POSITIVE RHEUMATOID FACTOR (HCC): Primary | ICD-10-CM

## 2023-01-30 DIAGNOSIS — M79.7 FIBROMYALGIA: ICD-10-CM

## 2023-01-30 DIAGNOSIS — Z51.81 ENCOUNTER FOR MONITORING OF HYDROXYCHLOROQUINE THERAPY: ICD-10-CM

## 2023-01-30 DIAGNOSIS — Z79.899 ENCOUNTER FOR MONITORING OF HYDROXYCHLOROQUINE THERAPY: ICD-10-CM

## 2023-01-30 PROCEDURE — G8417 CALC BMI ABV UP PARAM F/U: HCPCS | Performed by: STUDENT IN AN ORGANIZED HEALTH CARE EDUCATION/TRAINING PROGRAM

## 2023-01-30 PROCEDURE — G8427 DOCREV CUR MEDS BY ELIG CLIN: HCPCS | Performed by: STUDENT IN AN ORGANIZED HEALTH CARE EDUCATION/TRAINING PROGRAM

## 2023-01-30 PROCEDURE — G8484 FLU IMMUNIZE NO ADMIN: HCPCS | Performed by: STUDENT IN AN ORGANIZED HEALTH CARE EDUCATION/TRAINING PROGRAM

## 2023-01-30 PROCEDURE — 1036F TOBACCO NON-USER: CPT | Performed by: STUDENT IN AN ORGANIZED HEALTH CARE EDUCATION/TRAINING PROGRAM

## 2023-01-30 PROCEDURE — 76882 US LMTD JT/FCL EVL NVASC XTR: CPT | Performed by: STUDENT IN AN ORGANIZED HEALTH CARE EDUCATION/TRAINING PROGRAM

## 2023-01-30 PROCEDURE — 3017F COLORECTAL CA SCREEN DOC REV: CPT | Performed by: STUDENT IN AN ORGANIZED HEALTH CARE EDUCATION/TRAINING PROGRAM

## 2023-01-30 PROCEDURE — 99214 OFFICE O/P EST MOD 30 MIN: CPT | Performed by: STUDENT IN AN ORGANIZED HEALTH CARE EDUCATION/TRAINING PROGRAM

## 2023-01-30 RX ORDER — HYDROXYCHLOROQUINE SULFATE 200 MG/1
200 TABLET, FILM COATED ORAL 2 TIMES DAILY
Qty: 60 TABLET | Refills: 2 | Status: SHIPPED | OUTPATIENT
Start: 2023-01-30

## 2023-01-30 NOTE — PATIENT INSTRUCTIONS
Start Plaquenil 1 tab twice daily   Continue Water therapy  Ou were referred for plaquenil evaluation with eye doctor, please schedule   We will repeat labs at next visit   RTC in 8 weeks

## 2023-01-31 ENCOUNTER — HOSPITAL ENCOUNTER (OUTPATIENT)
Dept: PHYSICAL THERAPY | Age: 53
Setting detail: THERAPIES SERIES
Discharge: HOME | End: 2023-01-31
Payer: COMMERCIAL

## 2023-01-31 PROCEDURE — 97113 AQUATIC THERAPY/EXERCISES: CPT

## 2023-01-31 NOTE — FLOWSHEET NOTE
Physical Therapy Aquatic Flow Sheet   Date:  2023    Patient Name:  Edson Owens                     :  1970                       MRN: [id number]  Restrictions/Precautions: No data recorded   Diagnosis:   Polyarthralgia [M25.50]  Fibromyalgia [M79.7]    Date of Injury/Surgery:   Treatment Diagnosis:  Deconditioning, global tenderness, weakness  Insurance/Certification information:  [employer]   Pre-cert   Referring Physician:  Gumaro Hill*     PCP: Norberto Rodríguez MD  Next Doctor Visit:    Plan of care signed (Y/N): N, sent 22   Outcome Measure: QD: 55% disability   Visit# / total visits:    per POC  Pain level:      8-9/10  \"overall body pain\"  Goals:     Patient goals: improve pain. Short term goals  Time Frame for Short term goals: Defer to [address] Term Goals  Time Frame for Long Term Goals: 6 weeks   23  Pt will demo I with HEP/symptom management. Pt will demo >1050ft per 6MWT to demo improved ease and tolerance with prolonged walking. Pt will demo <15 sec with 5x sit to stand to demo improved ease with transfers. Pt will demo 5/5 LE strength to ease stairs and squatting necessary for work. Pt will demo full bridge with good technique with min/no increase in pain to demo improved strength/tolerance to ease prolonged activities. Subjective: Patient arrives for therapy with \"overall body pain\"; states that her L ankle is still hurting and she is trying to get referral to foot/ankle specialist now. Key  B= Belt DB= Dumbells T= Theratube   H= Hydrotone N= Noodles W= Weights   P= Paddles S= Speedo equipment K= Kickboard           Objective:    Patient needs minimal cueing for posture and abdominal stability with all activities  Decreasing pain after session. Exercises/Activities   Warm-up/Amb    Exercises      forward   X 4 laps length of pool holding 1# weight; with sup    Cool down laps end of session.     HR/TR  X15 UE support sideways  X 4 laps length of pool holding 1# weight; with sup  Marches  Walking marching x 3 laps holding 1# weight  in pool with cues for knee march up and posture    Standing marches BUE support as needed x 15    backwards  X4 laps length of pool holding 1# weight; with sup  Mini-squats  X10 with  BUE support, cues for form and sequence    Medium forward    4-way SLR  X10 ea Hip ext, abd and flexion with cues for BUE assist PRN and for posture           Medium sideways    Hip circles/fig 8      Small shuffle    Hamstring curls      Jog    Knee extension      Braiding    Pelvic tilts      Bicycling  Corner supported; x 3 min; mild c/o of shoulder pain   Scap squeezes          Shoulder flex/ext  X 15 Seated with no trunk or UE support, paddles closed; blk step under LE's; cues for abd bracing; posture    Functional    Shoulder abd/add  X 15 Seated with no trunk or UE support; paddles closed; blk step under LE's; cues for abd bracing; posture    Step 6\" pool step; R/L x 10 each no UE supports  Shoulder H. abd/add  X 15 Seated with no trunk or UE support; paddles closed; blk step under LE's; cues for abd bracing; posture         Standing trunk rotation       Lifting    Shoulder IR/ER      Hand to opp knee    Rowing      Push down squat    Bilateral pull down       UE PNF    Push/pull  X 15 Seated with no trunk or UE support; paddles closed; blk step under LE's; cues for abd bracing; posture    LE PNF    Push downs      Wall push ups    Arm circles      SLS    Elbow flex/ext          Chin tuck      Stretching    UT shrugs/rolls      Gastroc/Soleus    Rocking horse      Hamstring          SKTC    Other      Piriformis    Lumbar distraction  Patient BUE support on noodle grab bars, to tolerance intermittently during curls    Hip flexor    Reverse curls X 10 Patient BUE support on noodle grab bars    Ladder pull    Sit <> stand from pool bench; blk step under LE's; x 10;  balance; close sup; cues to improve form.      Pec  stretch          Post deltoid           Treatment Included:  [x] Therapeutic Exercise   [] Instruction in HEP  [] Group   [x] Therapeutic Activity      [x] Neuromuscular Re-education [x] Aquatic   [] Gait             [] Manual  Other:  Time In/ Time Out: 9925-7011    Timed Code Treatment Minutes:  55 min (4 Aquatics)    Total Treatment Minutes:  55 min    Objective Findings: Patient needs minimal cueing for posture and abdominal stability with activities  She demo's supervision with all exercises.   Ankle pain is still high 8-9/10 after session.  Overall body pain now 6/10 after session.    Communication with other providers:    Education to Patient: Education on monitoring post rehab response and posture during work activities    Adverse Reaction to Treatment: None    Assessment: Patient demos good tolerance with aquatic activities. She c/o jody shoulder pain during supported corner hanging bike activity; ankle pain is still high; but some better after session; and overall body pain has decreased now to 6/10 at end of session.  Postural awareness cues needed intermittently to improve form and balance.    Treatment/Activity Tolerance:  [x] Patient tolerated treatment well [] Patient limited by fatique  [] Patient limited by pain [] Patient limited by other medical complications  [] Other:     Prognosis: [x] Good [] Fair  [] Poor    Patient Requires Follow-up: [] Yes  [] No    Plan:   [x] Continue per plan of care [] Alter current plan (see comments)  [] Plan of care initiated [] Hold pending MD visit [] Discharge    See Weekly Progress Note:    [] Yes [] No Next due:        Electronically signed by:  Renetta Gottlieb PTA,   1/31/2023, 11:56 AM

## 2023-02-09 ENCOUNTER — HOSPITAL ENCOUNTER (OUTPATIENT)
Dept: PHYSICAL THERAPY | Age: 53
Setting detail: THERAPIES SERIES
Discharge: HOME | End: 2023-02-09
Payer: COMMERCIAL

## 2023-02-09 PROCEDURE — 97530 THERAPEUTIC ACTIVITIES: CPT

## 2023-02-09 PROCEDURE — 97110 THERAPEUTIC EXERCISES: CPT

## 2023-02-09 NOTE — FLOWSHEET NOTE
Outpatient Physical Therapy  [place]           [x] Phone: [phone]   Fax: [phone]  [place]           [] Phone: [phone]   Fax: [phone]        Physical Therapy Daily Treatment Note  Date:  2023    Patient Name:  Miguel Anne    :  1970  MRN: [id number]  Restrictions/Precautions: No data recorded      Diagnosis:   Polyarthralgia [M25.50]   ( L ankle pain added to POC )  Fibromyalgia [M79.7]    Date of Injury/Surgery:   Treatment Diagnosis:  Deconditioning, global tenderness, weakness  Insurance/Certification information:  [facility]    60 units from 22-23  Referring Physician:  Prem Mckee*     PCP: David Bragg MD  Next Doctor Visit:    Plan of care signed (Y/N): N, sent 22   Outcome Measure: QD: 55% disability   Visit# / total visits:   per POC  Pain level:  10+/10   Goals:     Patient goals: improve pain. Short term goals  Time Frame for Short term goals: Defer to [address] Term Goals  Time Frame for Long Term Goals: 6 weeks   23  Pt will demo I with HEP/symptom management. Pt will demo >1050ft per 6MWT to demo improved ease and tolerance with prolonged walking. Partially MET   Pt will demo <15 sec with 5x sit to stand to demo improved ease with transfers. Partially MET   Pt will demo 5/5 LE strength to ease stairs and squatting necessary for work. Partially MET   Pt will demo full bridge with good technique with min/no increase in pain to demo improved strength/tolerance to ease prolonged activities. Mostly MET       Summary of Evaluation:   Pt is 46year old female with chronic pain with history of fibromyalgia and ramirez danlos syndrome. Pt now has difficulties completing ADLs and work duties. Pt demo deficits this date that include global tenderness, limited gait/activity tolerance, reduced strength/ROM and pain. Testing this date indicate signs and symptoms of deconditioning amplifying symptoms.  Pt will benefit with PT services with aquatics to help manage pain and improve function to return to PLOF. Pt prior to onset of current condition had min/mod pain with able to complete full ADLs and work activities. Patient received education on their current pathology and how their condition effects them with their functional activities. Patient understood discussion and questions were answered. Patient understands their activity limitations and understands rational for treatment progression. Subjective:  Feels she is improving with benefit with aquatics. Overall \"head going feels like its going to explode. \" Global soreness into all joints and L ankle. 3-4 weeks with ankle inversion sprain after shuffling feet in the barn. Any changes in Ambulatory Summary Sheet? None        Objective:            5x sit to stand: 22 sec today        (18.21 sec with knee/thigh pain with L > R at eval)   6MWT: 964ft without rest break      8/10 ankle pain                  965ft without rest break   5/10 knee pain     Mod SHIN: 2-3/10  4/5 knee flex/ext      4+/5 hip flex/ABD/ext   L SLS: 3 sec with increase in pain           R SLS: 10 sec   Full L ankle AR OM  with 4/5 INV/EV with discomfort      Lateral ankle global tenderness   Full bridge with mod increase in back pain.        Quick DASH: 59% disability   LEFS: 40/80 full function         Exercises: (No more than 4 columns)   Exercise/Equipment 22  #1 23  #5 Date           WARM UP         Nu step   L1   4'           TABLE      Resisted ankle Ev  YTb 10x2    Ankle iso INV  10x2   2\"    Ankle circles   10x2 each dir                    STANDING      DL HR  10x                                             PROPRIOCEPTION                                    MODALITIES                      Other Therapeutic Activities/Education: --     Educated on benefits of aquatics and global changes with warm water to ease effectiveness of activity and mobility including for ankle along with plan for return to assess functional change and determine future management with use of aquatics. Home Exercise Program:  HO issued, reviewed and discussed with patient. Pt agreed to comply. Manual Treatments:  --      Modalities:  --      Communication with other providers:  POC sent 12/30/22   Visit 10       Assessment:  (Response towards treatment session) (Pain Rating)    Pt returns after completed few additional visits with aquatics. Global pain and headache contributing to reduced functional mobility. L ankle pain after inversion sprain with general lateral ankle pain. Objective measures similar to eval with acute onset ankle. Will continue aquatics to assist global pain and functional mobility with reduced weightbearing on ankle to assist with recovery as well. Will return after 4 visits of aquatics to assess change in mobility. Pt agreed to this plan. Pain post tx is same with min increase in discomfort into ankle. Pt is 46year old female with chronic pain with history of fibromyalgia and ramirez danlos syndrome. Pt now has difficulties completing ADLs and work duties. Pt demo deficits this date that include global tenderness, limited gait/activity tolerance, reduced strength/ROM and pain. Testing this date indicate signs and symptoms of deconditioning amplifying symptoms. Pt will benefit with PT services with aquatics to help manage pain and improve function to return to PLOF. Pt prior to onset of current condition had min/mod pain with able to complete full ADLs and work activities. Patient received education on their current pathology and how their condition effects them with their functional activities. Patient understood discussion and questions were answered. Patient understands their activity limitations and understands rational for treatment progression.      Plan for Next Session:   Specific Instructions for Next Treatment: Aquatics with return follow ups to assess changes      Time In / Time Out:    1207-5308           Timed Code/Total Treatment Minutes:  45/45'   15 TE,  30' TA       Next Progress Note due:  Eval 12/30/22  Visit 10       Plan of Care Interventions:  [x] Therapeutic Exercise  [] Modalities:  [] Therapeutic Activity     [] Ultrasound  [] Estim  [] Gait Training      [] Cervical Traction [] Lumbar Traction  [] Neuromuscular Re-education    [] Cold/hotpack [] Iontophoresis   [x] Instruction in HEP      [] Vasopneumatic   [] Dry Needling    [] Manual Therapy               [x] Aquatic Therapy              Electronically signed by:  Ana Luna, PT, DPT, OCS  2/9/2023, 7:31 AM    2/9/2023 7:31 AM

## 2023-02-14 ENCOUNTER — HOSPITAL ENCOUNTER (OUTPATIENT)
Dept: PHYSICAL THERAPY | Age: 53
Setting detail: THERAPIES SERIES
Discharge: HOME | End: 2023-02-14
Payer: COMMERCIAL

## 2023-02-14 PROCEDURE — 97113 AQUATIC THERAPY/EXERCISES: CPT

## 2023-02-14 NOTE — FLOWSHEET NOTE
Physical Therapy Aquatic Flow Sheet   Date:  2023    Patient Name:  Loi Hernandez                     :  1970                       MRN: [id number]  Restrictions/Precautions: No data recorded   Diagnosis:   Polyarthralgia [M25.50]  Fibromyalgia [M79.7]    Date of Injury/Surgery:   Treatment Diagnosis:  Deconditioning, global tenderness, weakness  Insurance/Certification information:  [employer]   Pre-cert   Referring Physician:  Michael Iqbal*     PCP: Gab Smith MD  Next Doctor Visit:    Plan of care signed (Y/N): N, sent 22   Outcome Measure: QD: 55% disability   Visit# / total visits:    per POC  Pain level:      6/10  \"overall body pain\"; 9/10 L ankle pain  Goals:     Patient goals: improve pain. Short term goals  Time Frame for Short term goals: Defer to [address] Term Goals  Time Frame for Long Term Goals: 6 weeks   23  Pt will demo I with HEP/symptom management. Pt will demo >1050ft per 6MWT to demo improved ease and tolerance with prolonged walking. Pt will demo <15 sec with 5x sit to stand to demo improved ease with transfers. Pt will demo 5/5 LE strength to ease stairs and squatting necessary for work. Pt will demo full bridge with good technique with min/no increase in pain to demo improved strength/tolerance to ease prolonged activities. Subjective: Patient arrives for therapy with \"overall body pain\"; states no new updates; she is still waiting on referral for her L ankle issues. Key  B= Belt DB= Dumbells T= Theratube   H= Hydrotone N= Noodles W= Weights   P= Paddles S= Speedo equipment K= Kickboard           Objective:    Patient needs minimal cueing for posture and abdominal stability with all activities  No change in pain level after aquatic session. Exercises/Activities   Warm-up/Amb    Exercises      Forward/backward   X 4 laps length of pool; cues for posture    Cool down laps end of session.     HR/TR  x sideways  X 4 laps length of pool; cues for posture  Marches  Walking marching x 2 laps of pool with cues for knee march up and posture    Standing marches BUE support as needed x 15      backwards    Mini-squats  X10 with  BUE support, cues for form and sequence    Medium forward    4-way SLR  X10 ea Hip ext, abd and flexion with cues for BUE assist PRN and for posture           Medium sideways    Hip circles/fig 8      Small shuffle    Hamstring curls      Jog    Knee extension      Braiding    Pelvic tilts      Bicycling  Corner supported; x 4 min (intermittent pauses with shoulder discomfort)  Scap squeezes          Shoulder flex/ext  X 15 Seated with no trunk or UE support; blk step under LE's; cues for abd bracing; posture    Functional    Shoulder abd/add  X 15 Seated with no trunk or UE support;  blk step under LE's; cues for abd bracing; posture    Step 6\" pool step; R/L x 10 each no UE supports  Shoulder H. abd/add  X 15 Seated with no trunk or UE support; blk step under LE's; cues for abd bracing; posture         Standing trunk rotation       Lifting    Shoulder IR/ER      Hand to opp knee    Rowing      Push down squat    Bilateral pull down       UE PNF    Push/pull  X 15 Seated with no trunk or UE support;  blk step under LE's; cues for abd bracing; posture    LE PNF    Push downs      Wall push ups    Arm circles      SLS    Elbow flex/ext          Chin tuck      Stretching    UT shrugs/rolls      Gastroc/Soleus    Rocking horse      Hamstring          SKTC    Other      Piriformis    Lumbar distraction  x    Hip flexor    Reverse curls x    Ladder pull    Sit <> stand from pool bench; blk step under LE's; x 10;  balance; close sup; cues to improve form.       Pec stretch          Post deltoid           Treatment Included:  [x] Therapeutic Exercise   [] Instruction in HEP  [] Group   [x] Therapeutic Activity      [x] Neuromuscular Re-education [x] Aquatic   [] Gait             [] Manual  Other:  Time In/ Time Out: 7014-4835    Timed Code Treatment Minutes:  40 min (3 Aquatics)    Total Treatment Minutes:  40 min    Objective Findings: Patient needs minimal cueing for posture and abdominal stability with activities  She demo's supervision with all exercises. Ankle pain is still same 9/10 after session. Overall body pain same 6/10 after session. Communication with other providers:    Education to Patient: Education on monitoring post rehab response and posture during work activities    Adverse Reaction to Treatment: None    Assessment: Patient demos good tolerance with aquatic activities. She c/o jody shoulder pain during supported corner hanging bike activity; ankle pain is still high; and overall body pain remains same 6/10 at end of session. Postural awareness cues needed intermittently to improve form and balance.     Treatment/Activity Tolerance:  [x] Patient tolerated treatment well [] Patient limited by fatique  [] Patient limited by pain [] Patient limited by other medical complications  [] Other:     Prognosis: [x] Good [] Fair  [] Poor    Patient Requires Follow-up: [] Yes  [] No    Plan:   [x] Continue per plan of care [] Alter current plan (see comments)  [] Plan of care initiated [] Hold pending MD visit [] Discharge    See Weekly Progress Note:    [] Yes [] No Next due:        Electronically signed by:  Keron Kurtz PTA,   2/14/2023, 4:54 PM

## 2023-02-21 ENCOUNTER — HOSPITAL ENCOUNTER (OUTPATIENT)
Dept: PHYSICAL THERAPY | Age: 53
Setting detail: THERAPIES SERIES
Discharge: HOME | End: 2023-02-21
Payer: COMMERCIAL

## 2023-02-21 PROCEDURE — 97113 AQUATIC THERAPY/EXERCISES: CPT

## 2023-02-21 NOTE — FLOWSHEET NOTE
Physical Therapy Aquatic Flow Sheet   Date:  2023    Patient Name:  Krista Herman                     :  1970                       MRN: [id number]  Restrictions/Precautions: No data recorded   Diagnosis:   Polyarthralgia [M25.50]  Fibromyalgia [M79.7]    Date of Injury/Surgery:   Treatment Diagnosis:  Deconditioning, global tenderness, weakness  Insurance/Certification information:  [employer]   Pre-cert   Referring Physician:  Filiberto Barker*     PCP: Lisandro Castro MD  Next Doctor Visit:    Plan of care signed (Y/N): N, sent 22   Outcome Measure: QD: 55% disability   Visit# / total visits:    per POC  Pain level:      5/10  \"overall body pain\"; 9+/10 L ankle pain  Goals:     Patient goals: improve pain. Short term goals  Time Frame for Short term goals: Defer to [address] Term Goals  Time Frame for Long Term Goals: 6 weeks   23  Pt will demo I with HEP/symptom management. Pt will demo >1050ft per 6MWT to demo improved ease and tolerance with prolonged walking. Pt will demo <15 sec with 5x sit to stand to demo improved ease with transfers. Pt will demo 5/5 LE strength to ease stairs and squatting necessary for work. Pt will demo full bridge with good technique with min/no increase in pain to demo improved strength/tolerance to ease prolonged activities. Subjective: Patient arrives for therapy with \"overall body pain\" is always there; but ankle is really hurting today; has an appointment with podiatrist but does not remember exact date; but it's coming up soon \"I think\". .... Key  B= Belt DB= Dumbells T= Theratube   H= Hydrotone N= Noodles W= Weights   P= Paddles S= Speedo equipment K= Kickboard           Objective: Patient ambulates with mild limping pattern; unable to put much WB thru going in/out of pool steps today. Pain remains high in L ankle even after session.       Exercises/Activities   Warm-up/Amb    Exercises      Forward/backward X 4 laps length of pool; cues for posture    Cool down laps end of session.     HR/TR  x    sideways  X 4 laps length of pool; cues for posture  Marches  Walking marching x 2 laps of pool with cues for knee march up and posture    Standing marches BUE support as needed x 15      backwards    Mini-squats  x    Medium forward    4-way SLR  X10 ea Hip ext, abd and flexion with cues for BUE assist PRN and for posture           Medium sideways    Hip circles/fig 8  X 10 circles BLE's    Small shuffle    Hamstring curls      Jog    Knee extension      Braiding    Pelvic tilts      Bicycling  Seated on pool bench x 5 min   Scap squeezes          Shoulder flex/ext  X 20 Seated with no trunk or UE support; blk step under LE's; cues for abd bracing; posture    Functional    Shoulder abd/add  X 20 Seated with no trunk or UE support;  blk step under LE's; cues for abd bracing; posture    Step 4\" blk  step; R/L x 10 each no UE supports  Shoulder H. abd/add  X 20 Seated with no trunk or UE support; blk step under LE's; cues for abd bracing; posture         Standing trunk rotation       Lifting    Shoulder IR/ER      Hand to opp knee    Rowing      Push down squat    Bilateral pull down       UE PNF    Push/pull  X 20 Seated with no trunk or UE support;  blk step under LE's; cues for abd bracing; posture    LE PNF    Push downs      Wall push ups    Arm circles      SLS    Elbow flex/ext          Chin tuck      Stretching    UT shrugs/rolls      Gastroc/Soleus    Rocking horse      Hamstring          SKTC    Other      Piriformis    Lumbar distraction  x    Hip flexor    Reverse curls x    Ladder pull    Sit <> stand from pool bench; blk step under LE's; 2 x 10 with improving balance and form      Pec stretch          Post deltoid           Treatment Included:  [x] Therapeutic Exercise   [] Instruction in HEP  [] Group   [x] Therapeutic Activity      [x] Neuromuscular Re-education [x] Aquatic   [] Gait             [] Manual  Other:  Time In/ Time Out: 9030-9640    Timed Code Treatment Minutes:  45 min (3 Aquatics)    Total Treatment Minutes:  45 min    Objective Findings: Patient needs minimal cueing for posture and abdominal stability with activities  She demo's supervision with all exercises. Ankle pain is still same 9+/10 after session. Overall body pain same 5/10 after session. Communication with other providers:    Education to Patient: Education on monitoring post rehab response and posture during work activities    Adverse Reaction to Treatment: None    Assessment: Patient demos good tolerance with aquatic activities. She c/o jody shoulder pain during supported corner hanging bike activity; ankle pain is still high; and overall body pain remains same 6/10 at end of session. Postural awareness cues needed intermittently to improve form and balance.     Treatment/Activity Tolerance:  [x] Patient tolerated treatment well [] Patient limited by fatique  [] Patient limited by pain [] Patient limited by other medical complications  [] Other:     Prognosis: [x] Good [] Fair  [] Poor    Patient Requires Follow-up: [] Yes  [] No    Plan:   [x] Continue per plan of care [] Alter current plan (see comments)  [] Plan of care initiated [] Hold pending MD visit [] Discharge    See Weekly Progress Note:    [] Yes [] No Next due:        Electronically signed by:  Galdino Pike PTA,   2/21/2023, 1:09 PM

## 2023-02-24 ENCOUNTER — APPOINTMENT (OUTPATIENT)
Dept: CT IMAGING | Age: 53
End: 2023-02-24
Payer: COMMERCIAL

## 2023-02-24 ENCOUNTER — APPOINTMENT (OUTPATIENT)
Dept: ULTRASOUND IMAGING | Age: 53
End: 2023-02-24
Payer: COMMERCIAL

## 2023-02-24 ENCOUNTER — HOSPITAL ENCOUNTER (OUTPATIENT)
Age: 53
Setting detail: OBSERVATION
Discharge: HOME | End: 2023-02-25
Attending: EMERGENCY MEDICINE | Admitting: STUDENT IN AN ORGANIZED HEALTH CARE EDUCATION/TRAINING PROGRAM
Payer: COMMERCIAL

## 2023-02-24 DIAGNOSIS — M79.605 LEFT LEG PAIN: ICD-10-CM

## 2023-02-24 DIAGNOSIS — R20.0 NUMBNESS: Primary | ICD-10-CM

## 2023-02-24 PROBLEM — I63.9 ISCHEMIC STROKE (HCC): Status: ACTIVE | Noted: 2023-02-24

## 2023-02-24 LAB
ANION GAP SERPL CALCULATED.3IONS-SCNC: 11 MMOL/L (ref 4–16)
BASOPHILS ABSOLUTE: 0.1 K/CU MM
BASOPHILS RELATIVE PERCENT: 0.7 % (ref 0–1)
BUN SERPL-MCNC: 10 MG/DL (ref 6–23)
CALCIUM SERPL-MCNC: 9 MG/DL (ref 8.3–10.6)
CHLORIDE BLD-SCNC: 99 MMOL/L (ref 99–110)
CHP ED QC CHECK: 341
CO2: 26 MMOL/L (ref 21–32)
CREAT SERPL-MCNC: 0.7 MG/DL (ref 0.6–1.1)
DIFFERENTIAL TYPE: ABNORMAL
EOSINOPHILS ABSOLUTE: 0.2 K/CU MM
EOSINOPHILS RELATIVE PERCENT: 1.9 % (ref 0–3)
GFR SERPL CREATININE-BSD FRML MDRD: >60 ML/MIN/1.73M2
GLUCOSE BLD-MCNC: 163 MG/DL (ref 70–99)
GLUCOSE BLD-MCNC: 341 MG/DL (ref 70–99)
GLUCOSE SERPL-MCNC: 391 MG/DL (ref 70–99)
HCT VFR BLD CALC: 42.7 % (ref 37–47)
HEMOGLOBIN: 13.3 GM/DL (ref 12.5–16)
IMMATURE NEUTROPHIL %: 0.2 % (ref 0–0.43)
INR BLD: 0.85 INDEX
LYMPHOCYTES ABSOLUTE: 4.4 K/CU MM
LYMPHOCYTES RELATIVE PERCENT: 44.8 % (ref 24–44)
MCH RBC QN AUTO: 27 PG (ref 27–31)
MCHC RBC AUTO-ENTMCNC: 31.1 % (ref 32–36)
MCV RBC AUTO: 86.8 FL (ref 78–100)
MONOCYTES ABSOLUTE: 0.8 K/CU MM
MONOCYTES RELATIVE PERCENT: 8.5 % (ref 0–4)
NUCLEATED RBC %: 0 %
PDW BLD-RTO: 13.3 % (ref 11.7–14.9)
PLATELET # BLD: 306 K/CU MM (ref 140–440)
PMV BLD AUTO: 9.7 FL (ref 7.5–11.1)
POTASSIUM SERPL-SCNC: 4.3 MMOL/L (ref 3.5–5.1)
PROTHROMBIN TIME: 10.9 SECONDS (ref 11.7–14.5)
RBC # BLD: 4.92 M/CU MM (ref 4.2–5.4)
SEGMENTED NEUTROPHILS ABSOLUTE COUNT: 4.3 K/CU MM
SEGMENTED NEUTROPHILS RELATIVE PERCENT: 43.9 % (ref 36–66)
SODIUM BLD-SCNC: 136 MMOL/L (ref 135–145)
TOTAL IMMATURE NEUTOROPHIL: 0.02 K/CU MM
TOTAL NUCLEATED RBC: 0 K/CU MM
TROPONIN T: <0.01 NG/ML
WBC # BLD: 9.9 K/CU MM (ref 4–10.5)

## 2023-02-24 PROCEDURE — G0378 HOSPITAL OBSERVATION PER HR: HCPCS

## 2023-02-24 PROCEDURE — 83036 HEMOGLOBIN GLYCOSYLATED A1C: CPT

## 2023-02-24 PROCEDURE — 80048 BASIC METABOLIC PNL TOTAL CA: CPT

## 2023-02-24 PROCEDURE — 82962 GLUCOSE BLOOD TEST: CPT

## 2023-02-24 PROCEDURE — 85025 COMPLETE CBC W/AUTO DIFF WBC: CPT

## 2023-02-24 PROCEDURE — 99285 EMERGENCY DEPT VISIT HI MDM: CPT

## 2023-02-24 PROCEDURE — 6360000002 HC RX W HCPCS: Performed by: EMERGENCY MEDICINE

## 2023-02-24 PROCEDURE — 85610 PROTHROMBIN TIME: CPT

## 2023-02-24 PROCEDURE — 6360000004 HC RX CONTRAST MEDICATION: Performed by: EMERGENCY MEDICINE

## 2023-02-24 PROCEDURE — 93971 EXTREMITY STUDY: CPT

## 2023-02-24 PROCEDURE — 96374 THER/PROPH/DIAG INJ IV PUSH: CPT

## 2023-02-24 PROCEDURE — 84484 ASSAY OF TROPONIN QUANT: CPT

## 2023-02-24 PROCEDURE — 70450 CT HEAD/BRAIN W/O DYE: CPT

## 2023-02-24 PROCEDURE — 93005 ELECTROCARDIOGRAM TRACING: CPT | Performed by: EMERGENCY MEDICINE

## 2023-02-24 PROCEDURE — 70498 CT ANGIOGRAPHY NECK: CPT

## 2023-02-24 PROCEDURE — 96375 TX/PRO/DX INJ NEW DRUG ADDON: CPT

## 2023-02-24 RX ORDER — OXYBUTYNIN CHLORIDE 5 MG/1
5 TABLET ORAL 2 TIMES DAILY
Status: DISCONTINUED | OUTPATIENT
Start: 2023-02-25 | End: 2023-02-25 | Stop reason: HOSPADM

## 2023-02-24 RX ORDER — BUTALBITAL, ACETAMINOPHEN AND CAFFEINE 50; 325; 40 MG/1; MG/1; MG/1
1 TABLET ORAL EVERY 6 HOURS PRN
Status: DISCONTINUED | OUTPATIENT
Start: 2023-02-24 | End: 2023-02-25 | Stop reason: HOSPADM

## 2023-02-24 RX ORDER — ENOXAPARIN SODIUM 100 MG/ML
40 INJECTION SUBCUTANEOUS DAILY
Status: DISCONTINUED | OUTPATIENT
Start: 2023-02-25 | End: 2023-02-25 | Stop reason: HOSPADM

## 2023-02-24 RX ORDER — METHOCARBAMOL 500 MG/1
500 TABLET, FILM COATED ORAL 3 TIMES DAILY
Status: DISCONTINUED | OUTPATIENT
Start: 2023-02-24 | End: 2023-02-25 | Stop reason: HOSPADM

## 2023-02-24 RX ORDER — METOCLOPRAMIDE HYDROCHLORIDE 5 MG/ML
10 INJECTION INTRAMUSCULAR; INTRAVENOUS ONCE
Status: COMPLETED | OUTPATIENT
Start: 2023-02-24 | End: 2023-02-24

## 2023-02-24 RX ORDER — INSULIN LISPRO 100 [IU]/ML
0-4 INJECTION, SOLUTION INTRAVENOUS; SUBCUTANEOUS
Status: DISCONTINUED | OUTPATIENT
Start: 2023-02-25 | End: 2023-02-25 | Stop reason: HOSPADM

## 2023-02-24 RX ORDER — POLYETHYLENE GLYCOL 3350 17 G/17G
17 POWDER, FOR SOLUTION ORAL DAILY PRN
Status: DISCONTINUED | OUTPATIENT
Start: 2023-02-24 | End: 2023-02-25 | Stop reason: HOSPADM

## 2023-02-24 RX ORDER — DOXEPIN HYDROCHLORIDE 25 MG/1
50 CAPSULE ORAL NIGHTLY
Status: DISCONTINUED | OUTPATIENT
Start: 2023-02-25 | End: 2023-02-25 | Stop reason: HOSPADM

## 2023-02-24 RX ORDER — ONDANSETRON 2 MG/ML
4 INJECTION INTRAMUSCULAR; INTRAVENOUS EVERY 6 HOURS PRN
Status: DISCONTINUED | OUTPATIENT
Start: 2023-02-24 | End: 2023-02-25 | Stop reason: HOSPADM

## 2023-02-24 RX ORDER — ONDANSETRON 4 MG/1
4 TABLET, ORALLY DISINTEGRATING ORAL EVERY 8 HOURS PRN
Status: DISCONTINUED | OUTPATIENT
Start: 2023-02-24 | End: 2023-02-25 | Stop reason: HOSPADM

## 2023-02-24 RX ORDER — ASPIRIN 81 MG/1
81 TABLET ORAL DAILY
Status: DISCONTINUED | OUTPATIENT
Start: 2023-02-25 | End: 2023-02-25 | Stop reason: HOSPADM

## 2023-02-24 RX ORDER — DIPHENHYDRAMINE HYDROCHLORIDE 50 MG/ML
25 INJECTION INTRAMUSCULAR; INTRAVENOUS ONCE
Status: COMPLETED | OUTPATIENT
Start: 2023-02-24 | End: 2023-02-24

## 2023-02-24 RX ORDER — LABETALOL HYDROCHLORIDE 5 MG/ML
10 INJECTION, SOLUTION INTRAVENOUS EVERY 10 MIN PRN
Status: DISCONTINUED | OUTPATIENT
Start: 2023-02-24 | End: 2023-02-25 | Stop reason: HOSPADM

## 2023-02-24 RX ORDER — INSULIN GLARGINE 100 [IU]/ML
30 INJECTION, SOLUTION SUBCUTANEOUS NIGHTLY
Status: DISCONTINUED | OUTPATIENT
Start: 2023-02-24 | End: 2023-02-25

## 2023-02-24 RX ORDER — INSULIN LISPRO 100 [IU]/ML
0-4 INJECTION, SOLUTION INTRAVENOUS; SUBCUTANEOUS NIGHTLY
Status: DISCONTINUED | OUTPATIENT
Start: 2023-02-24 | End: 2023-02-25

## 2023-02-24 RX ORDER — FAMOTIDINE 20 MG/1
20 TABLET, FILM COATED ORAL DAILY
Status: DISCONTINUED | OUTPATIENT
Start: 2023-02-25 | End: 2023-02-25 | Stop reason: HOSPADM

## 2023-02-24 RX ORDER — DEXTROSE MONOHYDRATE 100 MG/ML
INJECTION, SOLUTION INTRAVENOUS CONTINUOUS PRN
Status: DISCONTINUED | OUTPATIENT
Start: 2023-02-24 | End: 2023-02-25 | Stop reason: HOSPADM

## 2023-02-24 RX ORDER — TIZANIDINE 4 MG/1
4 TABLET ORAL EVERY 8 HOURS PRN
Status: DISCONTINUED | OUTPATIENT
Start: 2023-02-24 | End: 2023-02-25 | Stop reason: HOSPADM

## 2023-02-24 RX ORDER — TRAZODONE HYDROCHLORIDE 50 MG/1
50 TABLET ORAL NIGHTLY PRN
Status: DISCONTINUED | OUTPATIENT
Start: 2023-02-25 | End: 2023-02-25 | Stop reason: HOSPADM

## 2023-02-24 RX ORDER — HYDROXYCHLOROQUINE SULFATE 200 MG/1
200 TABLET, FILM COATED ORAL 2 TIMES DAILY
Status: DISCONTINUED | OUTPATIENT
Start: 2023-02-25 | End: 2023-02-25 | Stop reason: HOSPADM

## 2023-02-24 RX ORDER — ATORVASTATIN CALCIUM 40 MG/1
80 TABLET, FILM COATED ORAL NIGHTLY
Status: DISCONTINUED | OUTPATIENT
Start: 2023-02-25 | End: 2023-02-25 | Stop reason: HOSPADM

## 2023-02-24 RX ORDER — ASPIRIN 300 MG/1
300 SUPPOSITORY RECTAL DAILY
Status: DISCONTINUED | OUTPATIENT
Start: 2023-02-25 | End: 2023-02-25 | Stop reason: HOSPADM

## 2023-02-24 RX ADMIN — IOPAMIDOL 75 ML: 755 INJECTION, SOLUTION INTRAVENOUS at 19:11

## 2023-02-24 RX ADMIN — DIPHENHYDRAMINE HYDROCHLORIDE 25 MG: 50 INJECTION, SOLUTION INTRAMUSCULAR; INTRAVENOUS at 20:09

## 2023-02-24 RX ADMIN — METOCLOPRAMIDE 10 MG: 5 INJECTION, SOLUTION INTRAMUSCULAR; INTRAVENOUS at 20:09

## 2023-02-24 ASSESSMENT — PAIN SCALES - GENERAL
PAINLEVEL_OUTOF10: 7
PAINLEVEL_OUTOF10: 2

## 2023-02-24 ASSESSMENT — PAIN DESCRIPTION - DESCRIPTORS: DESCRIPTORS: BURNING

## 2023-02-24 ASSESSMENT — PAIN DESCRIPTION - ORIENTATION
ORIENTATION: LEFT
ORIENTATION: RIGHT

## 2023-02-24 ASSESSMENT — PAIN DESCRIPTION - PAIN TYPE: TYPE: ACUTE PAIN

## 2023-02-24 ASSESSMENT — PAIN DESCRIPTION - LOCATION
LOCATION: LEG
LOCATION: ANKLE;SHOULDER

## 2023-02-24 NOTE — ED NOTES
Pt has right sided facial numbness and lower left leg pain starting 1745. Pt took 162mg aspirin.      Connie Ferraro RN  02/24/23 [id number]

## 2023-02-24 NOTE — ED TRIAGE NOTES
Pt states that she thinks she has a blood clot in the left leg. She has been having pain and swelling for 3 days. Numbness tingling feeling in the face.

## 2023-02-25 ENCOUNTER — APPOINTMENT (OUTPATIENT)
Dept: MRI IMAGING | Age: 53
End: 2023-02-25
Payer: COMMERCIAL

## 2023-02-25 VITALS
HEIGHT: 64 IN | TEMPERATURE: 97.9 F | SYSTOLIC BLOOD PRESSURE: 157 MMHG | DIASTOLIC BLOOD PRESSURE: 88 MMHG | BODY MASS INDEX: 37.56 KG/M2 | RESPIRATION RATE: 29 BRPM | HEART RATE: 78 BPM | OXYGEN SATURATION: 95 % | WEIGHT: 220 LBS

## 2023-02-25 PROBLEM — G45.9 TIA (TRANSIENT ISCHEMIC ATTACK): Status: ACTIVE | Noted: 2023-02-25

## 2023-02-25 PROBLEM — G43.009 ATYPICAL MIGRAINE: Status: ACTIVE | Noted: 2023-02-25

## 2023-02-25 PROBLEM — R73.9 HYPERGLYCEMIA: Status: ACTIVE | Noted: 2023-02-25

## 2023-02-25 LAB
CHOLEST SERPL-MCNC: 147 MG/DL
EKG ATRIAL RATE: 82 BPM
EKG DIAGNOSIS: NORMAL
EKG P AXIS: 50 DEGREES
EKG P-R INTERVAL: 160 MS
EKG Q-T INTERVAL: 382 MS
EKG QRS DURATION: 94 MS
EKG QTC CALCULATION (BAZETT): 446 MS
EKG R AXIS: -3 DEGREES
EKG T AXIS: 25 DEGREES
EKG VENTRICULAR RATE: 82 BPM
ESTIMATED AVERAGE GLUCOSE: 240 MG/DL
ESTIMATED AVERAGE GLUCOSE: 246 MG/DL
GLUCOSE BLD-MCNC: 154 MG/DL (ref 70–99)
GLUCOSE BLD-MCNC: 172 MG/DL (ref 70–99)
HBA1C MFR BLD: 10 % (ref 4.2–6.3)
HBA1C MFR BLD: 10.2 % (ref 4.2–6.3)
HCT VFR BLD CALC: 41.3 % (ref 37–47)
HDLC SERPL-MCNC: 53 MG/DL
HEMOGLOBIN: 13.2 GM/DL (ref 12.5–16)
LDLC SERPL CALC-MCNC: 67 MG/DL
MCH RBC QN AUTO: 27.1 PG (ref 27–31)
MCHC RBC AUTO-ENTMCNC: 32 % (ref 32–36)
MCV RBC AUTO: 84.8 FL (ref 78–100)
PDW BLD-RTO: 13.2 % (ref 11.7–14.9)
PLATELET # BLD: 280 K/CU MM (ref 140–440)
PMV BLD AUTO: 9.5 FL (ref 7.5–11.1)
RBC # BLD: 4.87 M/CU MM (ref 4.2–5.4)
TRIGL SERPL-MCNC: 134 MG/DL
WBC # BLD: 7.9 K/CU MM (ref 4–10.5)

## 2023-02-25 PROCEDURE — 85027 COMPLETE CBC AUTOMATED: CPT

## 2023-02-25 PROCEDURE — 6370000000 HC RX 637 (ALT 250 FOR IP): Performed by: STUDENT IN AN ORGANIZED HEALTH CARE EDUCATION/TRAINING PROGRAM

## 2023-02-25 PROCEDURE — 6360000002 HC RX W HCPCS: Performed by: STUDENT IN AN ORGANIZED HEALTH CARE EDUCATION/TRAINING PROGRAM

## 2023-02-25 PROCEDURE — 80061 LIPID PANEL: CPT

## 2023-02-25 PROCEDURE — 83036 HEMOGLOBIN GLYCOSYLATED A1C: CPT

## 2023-02-25 PROCEDURE — G0378 HOSPITAL OBSERVATION PER HR: HCPCS

## 2023-02-25 PROCEDURE — 93010 ELECTROCARDIOGRAM REPORT: CPT | Performed by: INTERNAL MEDICINE

## 2023-02-25 PROCEDURE — 36415 COLL VENOUS BLD VENIPUNCTURE: CPT

## 2023-02-25 PROCEDURE — 97161 PT EVAL LOW COMPLEX 20 MIN: CPT

## 2023-02-25 PROCEDURE — 97165 OT EVAL LOW COMPLEX 30 MIN: CPT

## 2023-02-25 PROCEDURE — 92610 EVALUATE SWALLOWING FUNCTION: CPT

## 2023-02-25 PROCEDURE — 82962 GLUCOSE BLOOD TEST: CPT

## 2023-02-25 PROCEDURE — 96372 THER/PROPH/DIAG INJ SC/IM: CPT

## 2023-02-25 PROCEDURE — 99205 OFFICE O/P NEW HI 60 MIN: CPT | Performed by: NURSE PRACTITIONER

## 2023-02-25 RX ORDER — ASPIRIN 81 MG/1
81 TABLET ORAL DAILY
Qty: 30 TABLET | Refills: 3 | Status: SHIPPED | OUTPATIENT
Start: 2023-02-26

## 2023-02-25 RX ORDER — INSULIN GLARGINE 100 [IU]/ML
30 INJECTION, SOLUTION SUBCUTANEOUS NIGHTLY
Status: DISCONTINUED | OUTPATIENT
Start: 2023-02-25 | End: 2023-02-25 | Stop reason: HOSPADM

## 2023-02-25 RX ORDER — INSULIN LISPRO 100 [IU]/ML
0-4 INJECTION, SOLUTION INTRAVENOUS; SUBCUTANEOUS NIGHTLY
Status: DISCONTINUED | OUTPATIENT
Start: 2023-02-25 | End: 2023-02-25 | Stop reason: HOSPADM

## 2023-02-25 RX ADMIN — ATORVASTATIN CALCIUM 80 MG: 40 TABLET, FILM COATED ORAL at 00:02

## 2023-02-25 RX ADMIN — METHOCARBAMOL 500 MG: 500 TABLET ORAL at 13:46

## 2023-02-25 RX ADMIN — ASPIRIN 81 MG: 81 TABLET, COATED ORAL at 09:48

## 2023-02-25 RX ADMIN — OXYBUTYNIN CHLORIDE 5 MG: 5 TABLET ORAL at 00:02

## 2023-02-25 RX ADMIN — METHOCARBAMOL 500 MG: 500 TABLET ORAL at 00:02

## 2023-02-25 RX ADMIN — OXYBUTYNIN CHLORIDE 5 MG: 5 TABLET ORAL at 09:48

## 2023-02-25 RX ADMIN — ENOXAPARIN SODIUM 40 MG: 100 INJECTION SUBCUTANEOUS at 09:47

## 2023-02-25 RX ADMIN — HYDROXYCHLOROQUINE SULFATE 200 MG: 200 TABLET ORAL at 09:52

## 2023-02-25 RX ADMIN — DOXEPIN HYDROCHLORIDE 50 MG: 25 CAPSULE ORAL at 00:02

## 2023-02-25 RX ADMIN — FAMOTIDINE 20 MG: 20 TABLET ORAL at 09:48

## 2023-02-25 RX ADMIN — HYDROXYCHLOROQUINE SULFATE 200 MG: 200 TABLET ORAL at 00:44

## 2023-02-25 RX ADMIN — METHOCARBAMOL 500 MG: 500 TABLET ORAL at 09:48

## 2023-02-25 RX ADMIN — INSULIN GLARGINE 30 UNITS: 100 INJECTION, SOLUTION SUBCUTANEOUS at 00:44

## 2023-02-25 ASSESSMENT — PAIN SCALES - GENERAL: PAINLEVEL_OUTOF10: 2

## 2023-02-25 NOTE — CONSULTS
Neurology Service Consult Note  [address]   Patient Name: Griffin Martinez  : 1970        Subjective:   Reason for consult: \"My right face was numb and pressure yesterday\"  48 y.o. right-handed female with PMH listed below and vertebral dissection with CVA presenting to [place] [address] with complaints of right sided facial numbness with pressure starting one day prior to exam as she was at home resting. Patient states she had stroke 2 years ago and her symptoms were tongue and right lower face numbness and sensation changes, thu she was concerned. She states she had migraine two days prior. Patient states today she is back to normal. She states she takes ASA at home and has not missed doses. She has neurology in [facility] she follows with. She states that her symptoms have completely resolved and they lasted at least 10-12 hours. She cannot get MRI due to clipping in her brain here at this hospital. She states hse is not under more stress, but her aunt has recent stroke and was recently hospitalized. Denies HA, CP, SOB, fever, facial droop, aphasia dysarthria, unilateral arm and leg weakness. No balance changes. No recent falls. [facility] recently changed her diabetic medications and it is not working per labs and patient account. No family at the bedside. , Discussed the case with IM.        Past Medical History:   Diagnosis Date    Arthritis     Neck    Asthma     PCP - last episode:     Diabetes mellitus ([facility] [address]     Type II - follows with PCP    Ehler's-Danlos syndrome Dx:     PCP    Fibromyalgia     Hx of blood clots     Left lower leg - unsure of treatment    Hypertension     Middle cerebral aneurysm     surgery @ [place]    Mitral valve regurgitation     Originally saw Dr. Bradley Jean-Baptiste, now follows with PCP (updated 2021)    Raynauds syndrome     Short-term memory loss     due to aneurysm per pt    :   Past Surgical History:   Procedure Laterality Date ANKLE SURGERY Right 2010    surgery x2    BICEPS TENDON REPAIR Left     in left 5th digit    BRAIN SURGERY  2002    aneurysm repair     SECTION  1996    COLONOSCOPY  2020    Diverticulosis - Dr. Wilfredo Serna, COLON, DIAGNOSTIC  2020    \"Took biopsy and was irregular\", has to repeat in 2021    NERVE SURGERY Left 's    repair left elbow    SHOULDER ARTHROSCOPY Right 2021    RIGHT SHOULDER ARTHROSCOPY, SUBACROMIAL DECOMPRESSION DISTAL CLAVICLE RESECTION ROTATOR CUFF REPAIR DEBRIDEMENT performed by Stephanie Harper DO at [address] [facility]      vein stripping on right x2 / vein stripping on left x8     Medications:  Scheduled Meds:   insulin glargine  30 Units SubCUTAneous Nightly    insulin lispro  0-4 Units SubCUTAneous Nightly    enoxaparin  40 mg SubCUTAneous Daily    aspirin  81 mg Oral Daily    Or    aspirin  300 mg Rectal Daily    atorvastatin  80 mg Oral Nightly    doxepin  50 mg Oral Nightly    famotidine  20 mg Oral Daily    hydroxychloroquine  200 mg Oral BID    methocarbamol  500 mg Oral TID    oxybutynin  5 mg Oral BID    insulin lispro  0-4 Units SubCUTAneous TID WC     Continuous Infusions:   dextrose       PRN Meds:.ondansetron **OR** ondansetron, polyethylene glycol, labetalol, butalbital-acetaminophen-caffeine, tiZANidine, traZODone, glucose, dextrose bolus **OR** dextrose bolus, glucagon (rDNA), dextrose    Allergies   Allergen Reactions    Nubain [Nalbuphine Hcl]      Seizures      Secobarbital Sodium Anaphylaxis     SEIZURE    Sulfa Antibiotics Anaphylaxis    Adhesive Tape Rash    Demerol      seizures    Indocin [Indomethacin]      Social History     Socioeconomic History    Marital status:      Spouse name: Not on file    Number of children: Not on file    Years of education: Not on file    Highest education level: Not on file   Occupational History    Not on file   Tobacco Use    Smoking status: Never    Smokeless tobacco: Never   Vaping Use    Vaping Use: Never used   Substance and Sexual Activity    Alcohol use: Yes     Comment: Rarely - 1 time a year    Drug use: No    Sexual activity: Not on file   Other Topics Concern    Not on file   Social History Narrative    Not on file     Social Determinants of Health     Financial Resource Strain: Not on file   Food Insecurity: Not on file   Transportation Needs: Not on file   Physical Activity: Not on file   Stress: Not on file   Social Connections: Not on file   Intimate Partner Violence: Not on file   Housing Stability: Not on file      Family History   Problem Relation Age of Onset    Diabetes Mother     Diabetes Father     Heart Disease Father          ROS (10 systems)  In addition to that documented in the HPI above, the additional ROS was obtained:  Constitutional: Denies fevers or chills  Eyes: Denies vision changes  ENMT: Denies sore throat  CV: Denies chest pain  Resp: Denies SOB  GI: Denies vomiting or diarrhea  : Denies painful urination  MSK: Denies recent trauma  Skin: Denies new rashes  Neuro: Denies new numbness or tingling or weakness  Endocrine: Denies unexpected weight loss  Heme: Denies bleeding disorders    Physical Exam:       [unfilled]   Wt Readings from Last 3 Encounters:   02/24/23 220 lb (99.8 kg)   01/30/23 227 lb (103 kg)   12/16/22 231 lb (104.8 kg)     Temp Readings from Last 3 Encounters:   02/24/23 97.3 °F (36.3 °C) (Oral)   11/14/22 97.2 °F (36.2 °C) (Oral)   06/04/22 98 °F (36.7 °C)     BP Readings from Last 3 Encounters:   02/25/23 (!) 147/89   01/30/23 120/70   12/16/22 135/70     Pulse Readings from Last 3 Encounters:   02/25/23 73   01/30/23 80   12/16/22 75        Gen: A&O x 4, NAD, cooperative  HEENT: NC/AT, EOMI, PERRL, mmm, no carotid bruits, neck supple, no meningeal signs;    Heart: regular   Lungs: no distress  Ext: no edema, no calf tenderness b/l  Psych: normal mood and affect  Skin: no rashes or lesions    NEUROLOGIC EXAM:    Mental Status: A&O to self, location, month and year, NAD, speech clear, language fluent, repetition and naming intact, follows commands appropriately    Cranial Nerve Exam:   CN II-XII: , PERRL, VFF, no nystagmus, no gaze paresis, sensation V1-V3 intact b/l, muscles of facial expression symmetric; hearing intact to conversational tone, palate elevates symmetrically, shoulder elevation symmetric and tongue protrudes midline with movement side to side. Motor Exam:       Strength 5/5 UE's/LE's b/l  Tone and bulk normal   No pronator drift    Deep Tendon Reflexes: 2/4 biceps, triceps, brachioradialis, patellar, and achilles b/l; flexor plantar responses b/l    Sensation: Intact light touch/temp  UE's/LE's b/l    Coordination/Cerebellum:       Tremors--none      Rapidly alternating movements: no dysdiadochokinesia b/l                Finger-to-Nose: no dysmetria b/l    Gait and stance:      Gait: steady       LABS:     Recent Labs     02/24/23  1851 02/25/23  0636   WBC 9.9 7.9     --    K 4.3  --    CL 99  --    CO2 26  --    BUN 10  --    CREATININE 0.7  --    GLUCOSE 391*  --    INR 0.85  --        Hemoglobin a1c 10       IMAGING:      CT head:  1. No acute intracranial abnormality. 2. Fluid in the right maxillary sinus potentially related to acute sinusitis. 3. Chronic findings as above. CTA head and neck:  1. No large vessel occlusion of the intracranial arteries. 2. New moderate focal stenosis of the P2 segment left posterior cerebral   artery. 3. Chronic occlusion of the proximal right vertebral artery with partial   distal reconstitution. 4. Prior left middle cerebral artery aneurysm clipping. No residual aneurysm   filling identified.            Personally reviewed       ASSESSMENT/PLAN:     48year old female with reported right facial pressure and numbness secondary to reaction of previous infarction v. Atypical migraine superimposed on hx of vertebral dissection with CVA and uncontrolled DM2. Patient CT and CTA are as above and unremarkable. MRI cannot be obtained due to metal clipping, symptoms have resolved and exam is non focal. Patient needs to continue to ASA, do not feel MRI would show us infarction due to description of events and exam today, recommended discharge with follow up with PCP and neurology with repeat CT head in 2 weeks. No further recommendations. Thank you for allowing us to participate in the care of your patient. If there are any questions regarding evaluation please feel free to contact us.      KOREY Man - CHARITO, 2/25/2023

## 2023-02-25 NOTE — ED NOTES
Ultrasound at bedside.  Urine sent     Wayne Cameron RN  02/24/23 2019       Wayne Cameron RN  02/24/23 2019

## 2023-02-25 NOTE — H&P
V2.0  History and Physical      Name:  Robert Mooney /Age/Sex: 1970  (48 y.o. female)   MRN & CSN:  [id number] & [id number] Encounter Date/Time: 2023 9:59 PM EST   Location:  [facility] [place] [place] [address] PCP: Shital Reza MD       Hospital Day: 1    Assessment and Plan:   Robert Mooney is a 48 y.o. female with a pmh of HTN, DMII, HLD, Severe obesity who presents with Ischemic stroke [facility]    Hospital Problems             Last Modified POA    * (Principal) Ischemic stroke ([facility] [address] 2023 Yes       Acute facial numbness to rule out stroke   R [address] [place] 115 1 hr before coming to ER.  NHISS 1. Code stroke called in ED. [facility] contacted. tPA not advised. CT head / CTA head and neck negative for acute changes. EKG - NSR  Plan Aspirin, statin. Neuro check q4 HR. SLP. Brain MRI. PT/OT. Echo Neurology consulted    H/o aneurysm and prior CVA - status post clipping on the left- stable    Uncontrolled HTN -    Home meds not resumed. Permissive HTN. Target BP SBP <220. Labetalol PRN   Continue telemetry    Left knee injury  - She does report that she injured her left knee and 3 days ago  and was feeling strained in her lower leg. Since patient was concerned for DVT, doppler was done by ED which came back negative. DM II uncontrolled -    BS on admission 391. No DKA. On Lantus 60 at home. Will put on 30 U for now and slowly increase once patient starts eating after SLP    H/O migraine - on rimegepant, Fioricet PRN. Got Reglan in ER. Severe obesity - BMI 37. Diet modification and exercise    CECE - sees Pulm as OP. Stable. HLD - on statin    GERD - continue famotidine    ? RA - sees Rheum as OP. Continue home meds.   trazodone 100mg PRN, Tizanidine 4mg Q8H PRN    Urinary Incontinence  - On oxybutynin       Disposition:   Current Living situation: home  Expected Disposition: home  Estimated D/C: 1 day    Diet Diet NPO   DVT Prophylaxis [x] Lovenox, []  Heparin, [] SCDs, [] Ambulation,  [] Eliquis, [] Xarelto, [] Coumadin Code Status Prior   Surrogate Decision Maker/ POA      History from:     patient    History of Present Illness:     Chief Complaint: left face numbness  Sherlyn Shaver is a 48 y.o. female with pmh as mentioned above who presents with right-sided lower face numbness that is been ongoing for the last hour prior to arrival. Patient's last known well is reported as 1 hour prior to arrival, per patient . She has not had any weakness that she can recall. Has not had any headache currently but did have it previously. Denies any neck pain or stiffness. Patient has not had any chest pain or shortness of breath cough or congestion. She does report that she injured her left knee and 3 days ago and has been having pain in that area. She states that she has been told this might be related to more of a strain than anything else. Review of Systems: Need 10 Elements   Review of Systems    Deneies fever,chills, chest pain, SOB, abd pain, N/V, dysuria. Bowel and bladder habits normal    Objective:   No intake or output data in the 24 hours ending 02/24/23 2159   Vitals:   Vitals:    02/24/23 1910 02/24/23 1911 02/24/23 2144 02/24/23 2155   BP: (!) 201/91  (!) 174/81 (!) 174/81   Pulse: 81 78  72   Resp: (!) 40 13  13   Temp:    98 °F (36.7 °C)   SpO2: 97% 97%  97%   Weight:       Height:           Medications Prior to Admission     Prior to Admission medications    Medication Sig Start Date End Date Taking?  Authorizing Provider   hydroxychloroquine (PLAQUENIL) 200 MG tablet Take 1 tablet by mouth 2 times daily 1/30/23   Nnealina Vergara MD   Continuous Blood Gluc Sensor (DEXCOM G6 SENSOR) MISC  12/13/22   Historical Provider, MD   insulin aspart (NOVOLOG FLEXPEN) 100 UNIT/ML injection pen COVER WITH LOW DOSE SLIDING SCALE APPROXIMATELY 20 TO 30 MINUTES AFTER START OF MEAL 3 TIMES DAILY -MAX OF 24 UNITS DAILY 7/3/22   Historical Provider, MD   insulin glargine (LANTUS SOLOSTAR) 100 UNIT/ML injection pen INJECT 60 UNITS UNDER THE SKIN EVERY EVENING. 1/9/22   Historical Provider, MD   OZEMPIC, 0.25 OR 0.5 MG/DOSE, 2 MG/1.5ML SOPN  12/13/22   Historical Provider, MD   losartan (COZAAR) 100 MG tablet Take 100 mg by mouth daily    Historical Provider, MD   Ubrogepant (UBRELVY) 100 MG TABS TAKE 1 TABLET BY MOUTH AS NEEDED FOR MIGRAINE. IF HEADACHE RECURS OR DOES NOT IMPROVE, AN ADDITIONAL DOSE MAY BE TAKEN AFTER 2 HOURS. DO NOT 7/25/22   Historical Provider, MD   methocarbamol (ROBAXIN) 500 MG tablet Take 1 tablet by mouth 4 times daily for 10 days 11/15/22   Historical Provider, MD   naproxen (NAPROSYN) 500 MG tablet Take 1 tablet by mouth 2 times daily (with meals) 11/14/22   Nilay River MD   medroxyPROGESTERone (DEPO-PROVERA) 150 MG/ML injection  9/10/21   Historical Provider, MD   oxybutynin (DITROPAN) 5 MG tablet  7/24/21   Historical Provider, MD   fluticasone (FLONASE) 50 MCG/ACT nasal spray  9/2/21   Historical Provider, MD   losartan-hydroCHLOROthiazide (HYZAAR) 100-25 MG per tablet  8/9/21   Historical Provider, MD   doxepin (SINEQUAN) 50 MG capsule  9/8/21   Historical Provider, MD   clotrimazole-betamethasone (LOTRISONE) 1-0.05 % cream  9/7/21   Historical Provider, MD   famotidine (PEPCID) 20 MG tablet  9/12/21   Historical Provider, MD   meloxicam (MOBIC) 15 MG tablet  9/12/21   Historical Provider, MD   doxazosin (CARDURA) 4 MG tablet  8/13/21   Historical Provider, MD   Rimegepant Sulfate (NURTEC) 75 MG TBDP Take by mouth    Historical Provider, MD   TRULICITY 1.5 MG/0.5ML SOPN  4/28/21   Historical Provider, MD   oxybutynin (DITROPAN-XL) 5 MG extended release tablet Take 5 mg by mouth 2 times daily    Historical Provider, MD   losartan (COZAAR) 100 MG tablet Take 100 mg by mouth daily    Historical Provider, MD   traZODone (DESYREL) 100 MG tablet Take 50 mg by mouth nightly as needed for Sleep    Historical Provider, MD   ibuprofen (ADVIL;MOTRIN) 800 MG tablet Take 800 mg by mouth every 6 hours as needed for  Pain    Historical Provider, MD   glipiZIDE (GLUCOTROL) 10 MG tablet Take 10 mg by mouth daily    Historical Provider, MD   tiZANidine (ZANAFLEX) 4 MG tablet Take 4 mg by mouth every 8 hours as needed    Historical Provider, MD   rosuvastatin (CRESTOR) 20 MG tablet Take 20 mg by mouth daily    Historical Provider, MD   acyclovir (ZOVIRAX) 400 MG tablet  9/23/16   Historical Provider, MD   magnesium oxide (MAG-OX) 400 MG tablet Take 400 mg by mouth daily    Historical Provider, MD   potassium chloride SA (K-DUR;KLOR-CON M) 20 MEQ tablet Take 20 mEq by mouth 2 times daily     Historical Provider, MD   vitamin D (CHOLECALCIFEROL) 250 MCG (38208 UT) CAPS capsule Take 10,000 Units by mouth daily (with breakfast)     Historical Provider, MD   butalbital-acetaminophen-caffeine (FIORICET) per tablet Take 1 tablet by mouth every 4 hours as needed for Pain. 5/26/13   Linda Chin MD       Physical Exam: Need 8 Elements   Physical Exam     General: NAD  Eyes: EOMI  ENT: neck supple  Cardiovascular: Regular rate. Respiratory: Clear to auscultation  Gastrointestinal: Soft, non tender  Genitourinary: no suprapubic tenderness  Musculoskeletal: No edema. Skin: warm, dry  Neuro: Alert. Aox3. Decreased sensation in left side compared to right. Motor 5/5 in all ext  Psych: Mood appropriate.        Past Medical History:   PMHx   Past Medical History:   Diagnosis Date    Arthritis     Neck    Asthma     PCP - last episode: 2013    Diabetes mellitus ([facility] [address]     Type II - follows with PCP    Ehler's-Danlos syndrome Dx: 2000    PCP    Fibromyalgia     Hx of blood clots 2000    Left lower leg - unsure of treatment    Hypertension     Middle cerebral aneurysm 2002    surgery @ [place]    Mitral valve regurgitation     Originally saw Dr. Ralph Woodard, now follows with PCP (updated 1/13/2021)    Raynauds syndrome     Short-term memory loss 2002    due to aneurysm per pt     PSHX:  has a past surgical history that includes brain surgery (2002); Vein Surgery;  section (); Tubal ligation (); Nerve Surgery (Left, ); Biceps tendon repair (Left, ); Ankle surgery (Right, ); Colonoscopy (2020); Endoscopy, colon, diagnostic (2020); Tonsillectomy (); and Shoulder arthroscopy (Right, 2021). Allergies: Allergies   Allergen Reactions    Nubain [Nalbuphine Hcl]      Seizures      Secobarbital Sodium Anaphylaxis     SEIZURE    Sulfa Antibiotics Anaphylaxis    Adhesive Tape Rash    Demerol      seizures    Indocin [Indomethacin]      Fam HX:  family history includes Diabetes in her father and mother; Heart Disease in her father.   Soc HX:   Social History     Socioeconomic History    Marital status:      Spouse name: None    Number of children: None    Years of education: None    Highest education level: None   Tobacco Use    Smoking status: Never    Smokeless tobacco: Never   Vaping Use    Vaping Use: Never used   Substance and Sexual Activity    Alcohol use: Yes     Comment: Rarely - 1 time a year    Drug use: No       Medications:   Medications:    doxepin  50 mg Oral Nightly    [START ON 2023] famotidine  20 mg Oral Daily    hydroxychloroquine  200 mg Oral BID    insulin glargine  30 Units SubCUTAneous Nightly    methocarbamol  500 mg Oral TID    oxybutynin  5 mg Oral BID    [START ON 2023] insulin lispro  0-4 Units SubCUTAneous TID     insulin lispro  0-4 Units SubCUTAneous Nightly      Infusions:    dextrose       PRN Meds: butalbital-acetaminophen-caffeine, 1 tablet, Q6H PRN  tiZANidine, 4 mg, Q8H PRN  [START ON 2023] traZODone, 50 mg, Nightly PRN  glucose, 4 tablet, PRN  dextrose bolus, 125 mL, PRN   Or  dextrose bolus, 250 mL, PRN  glucagon (rDNA), 1 mg, PRN  dextrose, , Continuous PRN        Labs      CBC:   Recent Labs     23  1851   WBC 9.9   HGB 13.3        BMP:    Recent Labs     23  1851      K 4.3   CL 99   CO2 26   BUN 10   CREATININE 0.7   GLUCOSE 391* Hepatic: No results for input(s): AST, ALT, ALB, BILITOT, ALKPHOS in the last 72 hours. Lipids:   Lab Results   Component Value Date/Time    CHOL 214 12/13/2010 11:55 AM    HDL 47 12/13/2010 11:55 AM    TRIG 374 12/13/2010 11:55 AM     Hemoglobin A1C:   Lab Results   Component Value Date/Time    LABA1C 10.1 12/13/2010 11:56 AM     TSH: No results found for: TSH  Troponin:   Lab Results   Component Value Date/Time    TROPONINT <0.010 02/24/2023 06:51 PM    TROPONINT <0.010 11/14/2022 08:27 PM    TROPONINT <0.010 11/14/2022 06:20 PM     Lactic Acid: No results for input(s): LACTA in the last 72 hours. BNP: No results for input(s): PROBNP in the last 72 hours. UA:  Lab Results   Component Value Date/Time    NITRU NEGATIVE 06/21/2016 08:22 AM    COLORU LT YELLOW 06/21/2016 08:22 AM    WBCUA 1 06/21/2016 08:22 AM    Cherylin [place] <1 06/21/2016 08:22 AM    MUCUS RARE 06/21/2016 08:22 AM    BACTERIA OCCASIONAL 06/21/2016 08:22 AM    CLARITYU CLEAR 06/21/2016 08:22 AM    SPECGRAV 1.037 06/21/2016 08:22 AM    LEUKOCYTESUR NEGATIVE 06/21/2016 08:22 AM    UROBILINOGEN NORMAL 06/21/2016 08:22 AM    BILIRUBINUR NEGATIVE 06/21/2016 08:22 AM    BLOODU NEGATIVE 06/21/2016 08:22 AM    KETUA NEGATIVE 06/21/2016 08:22 AM     Urine Cultures: No results found for: Swetha Brown  Blood Cultures: No results found for: BC  No results found for: Rico Hurt  Organism: No results found for: ORG    Imaging/Diagnostics Last 24 Hours   CT HEAD WO CONTRAST    Addendum Date: 2/24/2023    ADDENDUM: Findings relayed via telephone to the ordering provider, Dr. Fareed Callahan, at 7:19 p.m. on 02/24/2023. Result Date: 2/24/2023  EXAMINATION: CT OF THE HEAD WITHOUT CONTRAST 2/24/2023 6:59 pm TECHNIQUE: CT of the head was performed without the administration of intravenous contrast. Automated exposure control, iterative reconstruction, and/or weight based adjustment of the mA/kV was utilized to reduce the radiation dose to as low as reasonably achievable. COMPARISON: Head CTA 02/24/2023, head CT 12/31/2021 HISTORY: ORDERING SYSTEM PROVIDED HISTORY: Stroke Symptoms TECHNOLOGIST PROVIDED HISTORY: Has a \"code stroke\" or \"stroke alert\" been called? ->Yes Reason for exam:->Stroke Symptoms Decision Support Exception - unselect if not a suspected or confirmed emergency medical condition->Emergency Medical Condition (MA) Is the patient pregnant?->No Reason for Exam: rt side facial numbness, same symptoms in the past when she had aneurysm FINDINGS: BRAIN/VENTRICLES:  No masses nor acute intracranial hemorrhage. Unchanged encephalomalacia in the left frontal and temporal lobes in the distribution of the left middle cerebral artery. Gray/white matter differentiation without findings of acute ischemia. No mass effect nor midline shift. Patent basilar cisterns and foramen magnum. No hydrocephalus. Minimal to mild diffuse atrophy. Minimal deep and periventricular white matter hypodensities likely due to chronic small vessel ischemia. ORBITS:  Normal without acute abnormality. SINUSES:  Minimal mucosal thickening in the bilateral ethmoid and right maxillary sinuses. Layering fluid in the right maxillary sinus. Anterior secretions in the right sphenoid sinus. Partial opacification of the right mastoid air cells likely due to effusion. SOFT TISSUES/SKULL:  No acute soft tissue abnormality. Changes of aneurysm clipping near the bifurcation of the M1 segment of the left middle cerebral with unchanged artifactual hyperdensity located just medially related to beam hardening. Mild atherosclerotic calcifications. No acute fracture. 1. No acute intracranial abnormality. 2. Fluid in the right maxillary sinus potentially related to acute sinusitis. 3. Chronic findings as above.      CTA HEAD NECK W CONTRAST    Result Date: 2/24/2023  EXAMINATION: CTA OF THE HEAD AND NECK WITH CONTRAST 2/24/2023 6:59 pm: TECHNIQUE: CTA of the head and neck was performed with the administration of intravenous contrast. Multiplanar reformatted images are provided for review. MIP images are provided for review. Stenosis of the internal carotid arteries measured using NASCET criteria. Automated exposure control, iterative reconstruction, and/or weight based adjustment of the mA/kV was utilized to reduce the radiation dose to as low as reasonably achievable. COMPARISON: 12/31/2021 HISTORY: ORDERING SYSTEM PROVIDED HISTORY: Stroke Symptoms TECHNOLOGIST PROVIDED HISTORY: Has a \"code stroke\" or \"stroke alert\" been called? ->Yes Reason for exam:->Stroke Symptoms Decision Support Exception - unselect if not a suspected or confirmed emergency medical condition->Emergency Medical Condition (MA) Reason for Exam: right side face numbness FINDINGS: CTA NECK: AORTIC ARCH/ARCH VESSELS: No dissection or arterial injury. No significant stenosis of the brachiocephalic or subclavian arteries. CAROTID ARTERIES: No dissection, arterial injury, or hemodynamically significant stenosis by NASCET criteria. VERTEBRAL ARTERIES: Chronic occlusion of the right vertebral artery V1 segment with only trace contrast in the V2 and V3 segments. The left vertebral artery is normal in caliber. SOFT TISSUES: The lung apices are clear. No cervical or superior mediastinal lymphadenopathy. The larynx and pharynx are unremarkable. No acute abnormality of the salivary and thyroid glands. BONES: No acute osseous abnormality. CTA HEAD: ANTERIOR CIRCULATION: No significant stenosis of the intracranial internal carotid, anterior cerebral, or middle cerebral arteries. Prior left middle cerebral artery aneurysm clipping. No residual aneurysm filling. [place] POSTERIOR CIRCULATION: New moderate focal stenosis of the P2 segment left posterior cerebral artery. No focal stenosis of the vertebral, basilar, or right posterior cerebral artery. No aneurysm. OTHER: No dural venous sinus thrombosis on this non-dedicated study. BRAIN: No mass effect or midline shift. No extra-axial fluid collection. The gray-white differentiation is maintained. 1. No large vessel occlusion of the intracranial arteries. 2. New moderate focal stenosis of the P2 segment left posterior cerebral artery. 3. Chronic occlusion of the proximal right vertebral artery with partial distal reconstitution. 4. Prior left middle cerebral artery aneurysm clipping. No residual aneurysm filling identified. VL DUP LOWER EXTREMITY VENOUS LEFT    Result Date: 2/24/2023  EXAMINATION: DUPLEX VENOUS ULTRASOUND OF THE LEFT LOWER EXTREMITY 2/24/2023 7:16 pm TECHNIQUE: Duplex ultrasound using B-mode/gray scaled imaging and Doppler spectral analysis and color flow was obtained of the deep venous structures of the left extremity. COMPARISON: Left lower extremity venous duplex ultrasound June 28, 2019 HISTORY: ORDERING SYSTEM PROVIDED HISTORY: r/o dvt TECHNOLOGIST PROVIDED HISTORY: Reason for exam:->r/o dvt FINDINGS: The visualized veins of the left lower extremity are patent and free of echogenic thrombus. The veins demonstrate good compressibility with normal color flow study and spectral analysis. No evidence of DVT in the left lower extremity.        Personally reviewed Lab Studies, Imaging, along with documentation from care every where and ED. and discussed case with patient    Electronically signed by Micky Huang MD on 2/24/2023 at 9:59 PM

## 2023-02-25 NOTE — PLAN OF CARE
Problem: Discharge Planning  Goal: Discharge to home or other facility with appropriate resources  Outcome: Progressing     Problem: Pain  Goal: Verbalizes/displays adequate comfort level or baseline comfort level  Outcome: Progressing

## 2023-02-25 NOTE — PROGRESS NOTES
4 Eyes Skin Assessment     NAME:  [id number] [address] OF BIRTH:  1970  MEDICAL RECORD NUMBER:  [id number]    The patient is being assessed for  Admission    I agree that One RN has performed a thorough Head to Toe Skin Assessment on the patient. ALL assessment sites listed below have been assessed. Areas assessed by both nurses:    Head, Face, Ears, Shoulders, Back, Chest, Arms, Elbows, Hands, Sacrum. Buttock, Coccyx, Ischium, and Legs. Feet and Heels        Does the Patient have a Wound?  No noted wound(s)       Lei Prevention initiated by RN: No   Wound Care Orders initiated by RN: No    Pressure Injury (Stage 3,4, Unstageable, DTI, NWPT, and Complex wounds) if present, place referral order by RN under : No    New and Established Ostomies, if present place, referral order under : No      Nurse 1 eSignature: Electronically signed by Ludy Peter RN on 2/25/23 at 12:46 AM EST    **SHARE this note so that the co-signing nurse can place an eSignature**    Nurse 2 eSignature: Electronically signed by Eddy Bundy RN on 2/25/23 at 12:49 AM EST

## 2023-02-25 NOTE — CARE COORDINATION
[facility] criteria for S/s Stroke R/O reviewed at this time, criteria supports Observation Admission.  DANILO,RN/CM

## 2023-02-25 NOTE — PROGRESS NOTES
[facility] ACUTE CARE OCCUPATIONAL THERAPY EVALUATION    Tom. Jamey 42, 1970, 3019/3019-A, 2/25/2023    Discharge Recommendation: Home      History:  Tangir[place]q:  The primary encounter diagnosis was Numbness. A diagnosis of Left leg pain was also pertinent to this visit. Past Medical History:   Diagnosis Date    Arthritis     Neck    Asthma     PCP - last episode: 2013    Diabetes mellitus ([place] [address]     Type II - follows with PCP    Ehler's-Danlos syndrome Dx: 2000    PCP    Fibromyalgia     Hx of blood clots 2000    Left lower leg - unsure of treatment    Hypertension     Middle cerebral aneurysm 2002    surgery @ [place]    Mitral valve regurgitation     Originally saw Dr. Brittny York, now follows with PCP (updated 1/13/2021)    Raynauds syndrome     Short-term memory loss 2002    due to aneurysm per pt         Subjective:  Patient states:  \"Oh I'm leaving soon I hope\"  Pain:  denies  Communication with other providers: co-eval w/ PT, handoff to RN  Restrictions: General Precautions, Fall Risk,     Home Setup/Prior level of function:  Social/Functional History  Lives With: Spouse  Type of Home: House  Home Layout:  (full flight of stairs)  Has the patient had two or more falls in the past year or any fall with injury in the past year?: No  ADL Assistance: Independent  Homemaking Assistance: Independent  Ambulation Assistance: Independent  Transfer Assistance: Independent  Active : Yes  Occupation: Full time employment     Examination:  Observation: Supine in bed upon arrival, agreeable to therapy eval.  Vision: WFL  Hearing: WFL  Vitals: Stable vitals throughout session on room air      [address] and functions:  ROM: WFL   Strength: B UE grossly 5/5 across all major joints   Sensation: WFL  Tone: Normal  Coordination: WFL  Perception: WNL      Cognitive and Psychosocial Functioning:  Overall cognitive status: alert and oriented, WFL  Affect: Normal       Functional Mobility:  Bed mobility:  mod I  Sitting balance:  mod I    Transfers: IND  Standing balance: IND w/o AD  Functional Mobility: IND long functional household distance w/o AD  Toilet/Shower Transfers: mod I        Activities of Daily Living (ADLs):  Feeding: IND  Grooming: IND  UB bathing: IND  LB bathing: IND  UB dressing: IND  LB dressing: IND  Toileting: IND    *ADL determined per observation of functional mobility, balance, activity tolerance, cognition, or actual ADL performance. AM-PAC 6 click short form for inpatient daily activity:   How much help from another person does the patient currently need. .. Unable  Dep A Lot  Max A A Lot   Mod A A Little  Min A A Little   CGA  SBA None   Mod I  Indep  Sup   1. Putting on and taking off regular lower body clothing? [] 1    [] 2   [] 2   [] 3   [] 3   [x] 4      2. Bathing (including washing, rinsing, drying)? [] 1   [] 2   [] 2 [] 3 [] 3 [x] 4   3. Toileting, which includes using toilet, bedpan, or urinal? [] 1    [] 2   [] 2   [] 3   [] 3   [x] 4     4. Putting on and taking off regular upper body clothing? [] 1   [] 2   [] 2   [] 3   [] 3    [x] 4      5. Taking care of personal grooming such as brushing teeth? [] 1   [] 2    [] 2 [] 3    [] 3   [x] 4      6. Eating meals? [] 1   [] 2   [] 2   [] 3   [] 3   [x] 4        Raw Score:  24     [24=0% impaired(CH), 23=1-19%(CI), 20-22=20-39%(CJ), 15-19=40-59%(CK), 10-14=60-79%(CL), 7-9=80-99%(CM), 6=100%(CN)]       Educated pt on role of OT, therapy POC and functional goals, progression w/ ADLs and transfers, importance of movement and OOB activity, d/c recommendations     Safety Measures: Gait belt used, Left in bed, Alarm in place  Recommendations for NURSING activity:  Up to chair for all 3 meals and up to bathroom for all toileting needs     Assessment:  Pt is a 48 y o F admitted d/t ischemic stroke. Pt at baseline is IND for ADLs, IND for high level IADLs, and IND for functional transfers/mobility w/o AD.  Pt currently presents at/near baseline w/ ADLs and mobility. No further acute OT needs. Complexity: Low  Prognosis: Good, no significant barriers to participation at this time.    Occupational Therapy Plan  Times Per Week: d/c         Time:   Time in: 4110  Time out: 1214  Total time: 9  Timed treatment minutes: 0        Electronically signed by:      Richa Enriquez OTR/L  [id number]

## 2023-02-25 NOTE — PROGRESS NOTES
Speech Language Pathology  Facility/Department: [address] [place] [address]   CLINICAL BEDSIDE SWALLOW EVALUATION    NAME: Essence Boo  : 1970  MRN: [id number]    ADMISSION DATE: 2023  ADMITTING DIAGNOSIS: has Varicose veins of left lower extremity with inflammation; Varicose veins of lower extremities with complications; Venous hypertension of lower extremity; Varicose veins of left lower extremity with other complications; Status post phlebectomy; Superficial phlebitis of left leg; Calcific tendinitis of right shoulder region; CECE (obstructive sleep apnea); Obesity (BMI 30-39.9); Hypersomnia; and Ischemic stroke ([facility] on their problem list.  ONSET DATE: this admission    Recent Chest Xray/CT of Chest: (23 )    Date of Eval: 2023  Evaluating Therapist: Jessica Glass, SLP      Essence Boo was referred for a bedside swallowing evaluation following a admission to [facility] with numbness. No prior dysphagia hx noted. Medical hx includes varicose veins, hypersomnia, ischemic stroke, phlebitis. Essence Boo was upright in bed and cooperative throughout the evaluation. Oral mechanism is intact. Pt given PO trials of thin liquids and regular solids. Oral phase appears WFL characterized by efficient mastication. Pharyngeal phase appears WFL characterized by adequate timing of the pharyngeal swallow and laryngeal elevation. No overt s/s of aspiration noted throughout any of the trials. Recommend regular diet with thin liquids. Recommend general aspiration precautions. No further ST recommended. Results/recommendations d/w RN and pt.      Current Diet level:  Current Diet : NPO      Primary Complaint       Pain:  Pain Assessment  Pain Assessment: 0-10  Pain Level: 2  Patient's Stated Pain Goal: 5  Pain Location: Ankle, Shoulder  Pain Orientation: Right  Pain Descriptors: Burning  Pain Type: Acute pain    Reason for Referral  Essence Boo was referred for a bedside swallow evaluation to assess the efficiency of her swallow function, identify signs and symptoms of aspiration and make recommendations regarding safe dietary consistencies, effective compensatory strategies, and safe eating environment. Impression  Dysphagia Diagnosis: Swallow function appears WFL  Dysphagia Impression : appears WFL at the bedside        Treatment Plan  Requires SLP Intervention: No  Duration of Treatment: n/a  D/C Recommendations: To be determined       Recommended Diet and Intervention        Recommended Form of Meds: PO          Compensatory Swallowing Strategies  Compensatory Swallowing Strategies : Alternate solids and liquids;Small bites/sips;Eat/Feed slowly; Remain upright for 30-45 minutes after meals;Upright as possible for all oral intake    Treatment/Goals  Short-term Goals  Goal 1: n/a    General  Chart Reviewed: Yes  Comments: Possible TIA  Behavior/Cognition: Alert; Cooperative  Respiratory Status: Room air  O2 Device: None (Room air)  Communication Observation: Functional  Follows Directions: Complex  Dentition: Adequate  Patient Positioning: Upright in bed  Baseline Vocal Quality: Normal  Volitional Cough: Strong  Prior Dysphagia History: none reported  Consistencies Administered: Regular; Thin           Vision/Hearing  Vision  Vision: Within Functional Limits  Hearing  Hearing: Within functional limits    Oral Motor Deficits  Oral/Motor  Oral Hygiene: Moist    Oral Phase Dysfunction  Oral Phase  Oral Phase: WFL     Indicators of Pharyngeal Phase Dysfunction   Pharyngeal Phase   Pharyngeal Phase: appears WFL at the bedside    Prognosis       Education  Patient Education: results/recommendations             Therapy Time               OPHELIA Chong  2/25/2023 9:33 AM

## 2023-02-25 NOTE — PROGRESS NOTES
Q Called @ 6960 from [facility].   RN was notified by Lazaro Lockwood from [facility]

## 2023-02-25 NOTE — ED NOTES
ED TO INPATIENT SBAR HANDOFF    Patient Name: Shell Headley   :  1970  48 y.o. MRN:  [id number]  Preferred Name  Eduar Madden  ED Room #:  TR04/04TR-04  Family/Caregiver Present no   Restraints no   Sitter no   Sepsis Risk Score Sepsis Risk Score: 1.12    Situation  Code Status: full code    Allergies: Nubain [nalbuphine hcl], Secobarbital sodium, Sulfa antibiotics, Adhesive tape, Demerol, and Indocin [indomethacin]  Weight:   Patient Vitals for the past 96 hrs (Last 3 readings):   Weight   23 1845 220 lb (99.8 kg)     Arrived from: home  Chief Complaint:   Chief Complaint   Patient presents with    Leg Pain     \"Possible blood clot\" left leg    Numbness     Right Side of Face x1 hour     Hospital Problem/Diagnosis:  Principal Problem:    Ischemic stroke ([facility]  Resolved Problems:    * No resolved hospital problems. *    Imaging:   VL DUP LOWER EXTREMITY VENOUS LEFT   Final Result   No evidence of DVT in the left lower extremity. CTA HEAD NECK W CONTRAST   Final Result   1. No large vessel occlusion of the intracranial arteries. 2. New moderate focal stenosis of the P2 segment left posterior cerebral   artery. 3. Chronic occlusion of the proximal right vertebral artery with partial   distal reconstitution. 4. Prior left middle cerebral artery aneurysm clipping. No residual aneurysm   filling identified. CT HEAD WO CONTRAST   Final Result   Addendum (preliminary) 1    ADDENDUM:   Findings relayed via telephone to the ordering provider, Dr. Horace Del Valle,    at   7:19 p.m. on 2023. Final   1. No acute intracranial abnormality. 2. Fluid in the right maxillary sinus potentially related to acute sinusitis. 3. Chronic findings as above.            Abnormal labs:   Abnormal Labs Reviewed   BASIC METABOLIC PANEL - Abnormal; Notable for the following components:       Result Value    Glucose 391 (*)     All other components within normal limits   CBC WITH AUTO DIFFERENTIAL - Abnormal; Notable for the following components:    MCHC 31.1 (*)     Lymphocytes % 44.8 (*)     Monocytes % 8.5 (*)     All other components within normal limits   PROTIME-INR - Abnormal; Notable for the following components:    Protime 10.9 (*)     All other components within normal limits   POCT GLUCOSE - Abnormal; Notable for the following components:    POC Glucose 341 (*)     All other components within normal limits     Critical values: no     Abnormal Assessment Findings:     Pt states that she thinks she has a blood clot in the left leg. She has been having pain and swelling for 3 days. Numbness tingling feeling in the face.      Background  History:   Past Medical History:   Diagnosis Date    Arthritis     Neck    Asthma     PCP - last episode: 2013    Diabetes mellitus ([facility]     Type II - follows with PCP    Ehler's-Danlos syndrome Dx: 2000    PCP    Fibromyalgia     Hx of blood clots 2000    Left lower leg - unsure of treatment    Hypertension     Middle cerebral aneurysm 2002    [facility]    Mitral valve regurgitation     Originally saw Dr. Terence Vargas, now follows with PCP (updated 1/13/2021)    Raynauds syndrome     Short-term memory loss 2002    due to aneurysm per pt       Assessment    Vitals/MEWS: MEWS Score: 0  Level of Consciousness: Alert (0)   Vitals:    02/24/23 1910 02/24/23 1911 02/24/23 2144 02/24/23 2155   BP: (!) 201/91  (!) 174/81 (!) 174/81   Pulse: 81 78  72   Resp: (!) 40 13  13   Temp:    98 °F (36.7 °C)   SpO2: 97% 97%  97%   Weight:       Height:         FiO2 (%):   O2 Flow Rate:    room air  Cardiac Rhythm: NSR  Pain Assessment:  [] Verbal [] Margette Ligas Scale  Pain Scale: Pain Assessment  Pain Assessment: 0-10  Pain Level: 7  Pain Location: Leg  Pain Orientation: Left  Pain Type: Acute pain  Last documented pain score (0-10 scale) Pain Level: 7  Last documented pain medication administered:   Mental Status: oriented  NIH Score: NIH NIH Stroke Scale  Interval: Baseline  Level of Consciousness (1a): Alert  LOC Questions (1b): Answers both correctly  LOC Commands (1c): Performs both tasks correctly  Best Gaze (2): Normal  Visual (3): No visual loss  Facial Palsy (4): Normal symmetrical movement  Motor Arm, Left (5a): No drift  Motor Arm, Right (5b): No drift  Motor Leg, Left (6a): No drift  Motor Leg, Right (6b): No drift  Limb Ataxia (7): Absent  Sensory (8): (!) Mild to Moderate (right sided facial numbness)  Best Language (9): No aphasia  Dysarthria (10): Normal  Extinction and Inattention (11): No abnormality  Total: 1   C-SSRS: Risk of Suicide: No Risk  Bedside swallow:    [place] Coma Scale (GCS): Anat Coma Scale  Eye Opening: Spontaneous  Best Verbal Response: Oriented  Best Motor Response: Obeys commands  [place] Coma Scale Score: 15  Active LDA's:   Peripheral IV 02/24/23 Right Antecubital (Active)   Site Assessment Clean, dry & intact 02/24/23 2631       Peripheral IV 02/24/23 Left Antecubital (Active)     PO Status: Regular  Pertinent or High Risk Medications/Drips: no   o If Yes, please provide details:   Pending Blood Product Administration: no     You may also review the ED PT Care Timeline found under the Summary Nursing Index tab. Recommendation    Pending orders   Plan for Discharge (if known):    Additional Comments:    If any further questions, please call Sending RN at [phone]    Electronically signed by: Electronically signed by Silver Whiteside RN on 2/24/2023 at 9:56 PM       Silver Whiteside RN  02/24/23 7341       Silver Whiteside RN  02/24/23 7360       Silver Whiteside RN  02/24/23 2974

## 2023-02-25 NOTE — DISCHARGE SUMMARY
V2.0  Discharge Summary    Name:  Sukhdev Patel /Age/Sex: 1970 (48 y.o. female)   Admit Date: 2023  Discharge Date: 23    MRN & CSN:  [id number] & [id number] Encounter Date and Time 23 1:14 PM EST    Attending:  Venkatesh Palacio MD Discharging Provider: Venkatesh Palacio MD       Hospital Course:     Brief HPI and Hospital course: Sukhdev Patel is a 48 y.o. female with past medical history of hypertension, uncontrolled type 2 diabetes mellitus, hyperlipidemia, migraine, severe obesity and vertebral dissection with CVA presented with history of right-sided facial numbness with pressure hour. patient was last known 1 hour before arrival to ED, NIHSS on presentation 1, stroke alert called by ED, CT head and CTA head and neck normal, [facility] stroke neurologist was consulted Dr. Karen Steele recommended patient is not a candidate for TNKase, admitted for further stroke work-up with MRI brain and neurology consultation,  started on aspirin and atorvastatin and permissive hypertension, A1c 10.2%, per patient she was started on a new medication in [facility] for her diabetes but she did not start at, as patient had aneurysm clips in her brain she is not eligible for MRI, neurology assessed and recommended to continue aspirin and atorvastatin as patient's symptoms resolved completely and they do not think this is stroke recommended to discharge with follow-up with PCP and neurology with repeat CT head in 2 weeks. Discharged the patient in a stable condition to follow-up with PCP and neurology as scheduled. The patient expressed appropriate understanding of, and agreement with the discharge recommendations, medications, and plan.      Consults this admission:  IP CONSULT TO NEUROLOGY    Discharge Diagnosis:   Ischemic stroke [facility])  Right facial weakness  Uncontrolled diabetes mellitus  Hypertension    Discharge Instruction:   Follow up appointments: PCP and neurology  Primary care physician: Inga Sullivan Paul Oseguera MD within 2 weeks  Diet: regular diet diabetic  Activity: activity as tolerated  Disposition: Discharged to:   [x]Home, []HHC, []SNF, []Acute Rehab, []Hospice   Condition on discharge: Stable  Labs and Tests to be Followed up as an outpatient by PCP or Specialist: CT head in 2 weeks    Discharge Medications:        Medication List        START taking these medications      aspirin 81 MG EC tablet  Take 1 tablet by mouth daily  Start taking on: February 26, 2023            CHANGE how you take these medications      oxybutynin 5 MG extended release tablet  Commonly known as: DITROPAN-XL  What changed: Another medication with the same name was removed. Continue taking this medication, and follow the directions you see here. CONTINUE taking these medications      butalbital-acetaminophen-caffeine -40 MG per tablet  Commonly known as: Fioricet  Take 1 tablet by mouth every 4 hours as needed for Pain.      Dexcom G6 Sensor Misc     doxazosin 4 MG tablet  Commonly known as: CARDURA     doxepin 50 MG capsule  Commonly known as: SINEQUAN     famotidine 20 MG tablet  Commonly known as: PEPCID     fluticasone 50 MCG/ACT nasal spray  Commonly known as: FLONASE     hydroxychloroquine 200 MG tablet  Commonly known as: Plaquenil  Take 1 tablet by mouth 2 times daily     Lantus SoloStar 100 UNIT/ML injection pen  Generic drug: insulin glargine     losartan-hydroCHLOROthiazide 100-25 MG per tablet  Commonly known as: HYZAAR     magnesium oxide 400 MG tablet  Commonly known as: MAG-OX     methocarbamol 500 MG tablet  Commonly known as: ROBAXIN     NovoLOG FlexPen 100 UNIT/ML injection pen  Generic drug: insulin aspart     Ozempic (0.25 or 0.5 MG/DOSE) 2 MG/1.5ML Sopn  Generic drug: Semaglutide(0.25 or 0.5MG/DOS)     rosuvastatin 20 MG tablet  Commonly known as: CRESTOR     vitamin D 250 MCG ([address] UT) Caps capsule  Commonly known as: CHOLECALCIFEROL            STOP taking these medications      acyclovir 400 MG tablet  Commonly known as: ZOVIRAX     clotrimazole-betamethasone 1-0.05 % cream  Commonly known as: LOTRISONE     doxycycline hyclate 100 MG tablet  Commonly known as: VIBRA-TABS     glipiZIDE 10 MG tablet  Commonly known as: GLUCOTROL     ibuprofen 800 MG tablet  Commonly known as: ADVIL;MOTRIN     losartan 100 MG tablet  Commonly known as: COZAAR     medroxyPROGESTERone 150 MG/ML injection  Commonly known as: DEPO-PROVERA     meloxicam 15 MG tablet  Commonly known as: MOBIC     naproxen 500 MG tablet  Commonly known as: NAPROSYN     Nurtec 75 MG Tbdp  Generic drug: Rimegepant Sulfate     potassium chloride 20 MEQ extended release tablet  Commonly known as: KLOR-CON M     tiZANidine 4 MG tablet  Commonly known as: ZANAFLEX     traZODone 100 MG tablet  Commonly known as: DESYREL     Trulicity 1.5 VQ/8.8FI SC injection  Generic drug: dulaglutide     Ubrelvy 100 MG Tabs  Generic drug: Ubrogepant               Where to Get Your Medications        These medications were sent to [address] [place] [address] [phone]  [facility], [facility]      Phone: [phone]   aspirin 81 MG EC tablet        Objective Findings at Discharge:   BP (!) 157/88   Pulse 78   Temp 97.9 °F (36.6 °C) (Oral)   Resp 29   Ht 5' 4\" (1.626 m)   Wt 220 lb (99.8 kg)   SpO2 95%   BMI 37.76 kg/m²       Physical Exam:   General: Afebrile, no distress  Eyes: EOMI, mucous membranes pink/moist  ENT: neck supple, no JVD  Cardiovascular: S1-S2 normal no murmur  Respiratory: Air entry good bilaterally no wheezing no crackles  Gastrointestinal: Soft, non tender bowel sounds normal  Genitourinary: no suprapubic tenderness  Musculoskeletal: No edema  Skin: warm, dry  Neuro: Alert. Oriented no focal deficit  Psych: Mood appropriate.          Labs and Imaging   CT HEAD WO CONTRAST    Addendum Date: 2/24/2023    ADDENDUM: Findings relayed via telephone to the ordering provider, Dr. Bety Cobb, at 7:19 p.m. on 02/24/2023. Result Date: 2/24/2023  EXAMINATION: CT OF THE HEAD WITHOUT CONTRAST 2/24/2023 6:59 pm TECHNIQUE: CT of the head was performed without the administration of intravenous contrast. Automated exposure control, iterative reconstruction, and/or weight based adjustment of the mA/kV was utilized to reduce the radiation dose to as low as reasonably achievable. COMPARISON: Head CTA 02/24/2023, head CT 12/31/2021 HISTORY: ORDERING SYSTEM PROVIDED HISTORY: Stroke Symptoms TECHNOLOGIST PROVIDED HISTORY: Has a \"code stroke\" or \"stroke alert\" been called? ->Yes Reason for exam:->Stroke Symptoms Decision Support Exception - unselect if not a suspected or confirmed emergency medical condition->Emergency Medical Condition (MA) Is the patient pregnant?->No Reason for Exam: rt side facial numbness, same symptoms in the past when she had aneurysm FINDINGS: BRAIN/VENTRICLES:  No masses nor acute intracranial hemorrhage. Unchanged encephalomalacia in the left frontal and temporal lobes in the distribution of the left middle cerebral artery. Gray/white matter differentiation without findings of acute ischemia. No mass effect nor midline shift. Patent basilar cisterns and foramen magnum. No hydrocephalus. Minimal to mild diffuse atrophy. Minimal deep and periventricular white matter hypodensities likely due to chronic small vessel ischemia. ORBITS:  Normal without acute abnormality. SINUSES:  Minimal mucosal thickening in the bilateral ethmoid and right maxillary sinuses. Layering fluid in the right maxillary sinus. Anterior secretions in the right sphenoid sinus. Partial opacification of the right mastoid air cells likely due to effusion. SOFT TISSUES/SKULL:  No acute soft tissue abnormality. Changes of aneurysm clipping near the bifurcation of the M1 segment of the left middle cerebral with unchanged artifactual hyperdensity located just medially related to beam hardening.   Mild atherosclerotic calcifications. No acute fracture. 1. No acute intracranial abnormality. 2. Fluid in the right maxillary sinus potentially related to acute sinusitis. 3. Chronic findings as above. CTA HEAD NECK W CONTRAST    Result Date: 2/24/2023  EXAMINATION: CTA OF THE HEAD AND NECK WITH CONTRAST 2/24/2023 6:59 pm: TECHNIQUE: CTA of the head and neck was performed with the administration of intravenous contrast. Multiplanar reformatted images are provided for review. MIP images are provided for review. Stenosis of the internal carotid arteries measured using NASCET criteria. Automated exposure control, iterative reconstruction, and/or weight based adjustment of the mA/kV was utilized to reduce the radiation dose to as low as reasonably achievable. COMPARISON: 12/31/2021 HISTORY: ORDERING SYSTEM PROVIDED HISTORY: Stroke Symptoms TECHNOLOGIST PROVIDED HISTORY: Has a \"code stroke\" or \"stroke alert\" been called? ->Yes Reason for exam:->Stroke Symptoms Decision Support Exception - unselect if not a suspected or confirmed emergency medical condition->Emergency Medical Condition (MA) Reason for Exam: right side face numbness FINDINGS: CTA NECK: AORTIC ARCH/ARCH VESSELS: No dissection or arterial injury. No significant stenosis of the brachiocephalic or subclavian arteries. CAROTID ARTERIES: No dissection, arterial injury, or hemodynamically significant stenosis by NASCET criteria. VERTEBRAL ARTERIES: Chronic occlusion of the right vertebral artery V1 segment with only trace contrast in the V2 and V3 segments. The left vertebral artery is normal in caliber. SOFT TISSUES: The lung apices are clear. No cervical or superior mediastinal lymphadenopathy. The larynx and pharynx are unremarkable. No acute abnormality of the salivary and thyroid glands. BONES: No acute osseous abnormality.  CTA HEAD: ANTERIOR CIRCULATION: No significant stenosis of the intracranial internal carotid, anterior cerebral, or middle cerebral arteries. Prior left middle cerebral artery aneurysm clipping. No residual aneurysm filling. Sallyanne Chain POSTERIOR CIRCULATION: New moderate focal stenosis of the P2 segment left posterior cerebral artery. No focal stenosis of the vertebral, basilar, or right posterior cerebral artery. No aneurysm. OTHER: No dural venous sinus thrombosis on this non-dedicated study. BRAIN: No mass effect or midline shift. No extra-axial fluid collection. The gray-white differentiation is maintained. 1. No large vessel occlusion of the intracranial arteries. 2. New moderate focal stenosis of the P2 segment left posterior cerebral artery. 3. Chronic occlusion of the proximal right vertebral artery with partial distal reconstitution. 4. Prior left middle cerebral artery aneurysm clipping. No residual aneurysm filling identified. VL DUP LOWER EXTREMITY VENOUS LEFT    Result Date: 2/24/2023  EXAMINATION: DUPLEX VENOUS ULTRASOUND OF THE LEFT LOWER EXTREMITY 2/24/2023 7:16 pm TECHNIQUE: Duplex ultrasound using B-mode/gray scaled imaging and Doppler spectral analysis and color flow was obtained of the deep venous structures of the left extremity. COMPARISON: Left lower extremity venous duplex ultrasound June 28, 2019 HISTORY: ORDERING SYSTEM PROVIDED HISTORY: r/o dvt TECHNOLOGIST PROVIDED HISTORY: Reason for exam:->r/o dvt FINDINGS: The visualized veins of the left lower extremity are patent and free of echogenic thrombus. The veins demonstrate good compressibility with normal color flow study and spectral analysis. No evidence of DVT in the left lower extremity. CBC:   Recent Labs     02/24/23  1851 02/25/23  0636   WBC 9.9 7.9   HGB 13.3 13.2    280     BMP:    Recent Labs     02/24/23  1851      K 4.3   CL 99   CO2 26   BUN 10   CREATININE 0.7   GLUCOSE 391*     Hepatic: No results for input(s): AST, ALT, ALB, BILITOT, ALKPHOS in the last 72 hours.   Lipids:   Lab Results   Component Value Date/Time CHOL 147 02/25/2023 06:36 AM    HDL 53 02/25/2023 06:36 AM    TRIG 134 02/25/2023 06:36 AM     Hemoglobin A1C:   Lab Results   Component Value Date/Time    LABA1C 10.0 02/25/2023 08:34 AM     TSH: No results found for: TSH  Troponin:   Lab Results   Component Value Date/Time    TROPONINT <0.010 02/24/2023 06:51 PM    TROPONINT <0.010 11/14/2022 08:27 PM    TROPONINT <0.010 11/14/2022 06:20 PM     Lactic Acid: No results for input(s): LACTA in the last 72 hours. BNP: No results for input(s): PROBNP in the last 72 hours.   UA:  Lab Results   Component Value Date/Time    NITRU NEGATIVE 06/21/2016 08:22 AM    COLORU LT YELLOW 06/21/2016 08:22 AM    WBCUA 1 06/21/2016 08:22 AM    Giselle Flores <1 06/21/2016 08:22 AM    MUCUS RARE 06/21/2016 08:22 AM    BACTERIA OCCASIONAL 06/21/2016 08:22 AM    CLARITYU CLEAR 06/21/2016 08:22 AM    SPECGRAV 1.037 06/21/2016 08:22 AM    LEUKOCYTESUR NEGATIVE 06/21/2016 08:22 AM    UROBILINOGEN NORMAL 06/21/2016 08:22 AM    BILIRUBINUR NEGATIVE 06/21/2016 08:22 AM    BLOODU NEGATIVE 06/21/2016 08:22 AM    KETUA NEGATIVE 06/21/2016 08:22 AM     Urine Cultures: No results found for: Tod Foster  Blood Cultures: No results found for: BC  No results found for: BLOODCULT2  Organism: No results found for: ORG    Time Spent Discharging patient 33 minutes    Electronically signed by Fay France MD on 2/25/2023 at 1:14 PM

## 2023-02-25 NOTE — ED PROVIDER NOTES
Triage Chief Complaint:    Leg Pain (\"Possible blood clot\" left leg) and Numbness (Right Side of Face x1 hour)    ANNA Esquivel is a 48 y.o. female that presents for evaluation of right-sided lower face numbness that is been ongoing for the last hour prior to arrival. Patient's last known well is reported as 1 hour prior to arrival, per patient . She has not had any weakness that she can recall. Has not had any headache currently but did have it previously. Denies any neck pain or stiffness. Patient has not had any chest pain or shortness of breath cough or congestion. She does report that she injured her left knee and 3 days ago and has been having pain in that area. She states that she has been told this might be related to more of a strain than anything else. With her history of aneurysm and prior CVA she was concerned about possible stroke. She also reports a history of aneurysm status post clipping on the left. History from : Patient    Limitations to history : None    ROS:  At least 10 systems reviewed and otherwise acutely negative except as in the [address]     Past Medical History:   Diagnosis Date    Arthritis     Neck    Asthma     PCP - last episode: 2013    Diabetes mellitus ([facility] [address]     Type II - follows with PCP    Ehler's-Danlos syndrome Dx: 2000    PCP    Fibromyalgia     Hx of blood clots 2000    Left lower leg - unsure of treatment    Hypertension     Middle cerebral aneurysm 2002    surgery @ [place]    Mitral valve regurgitation     Originally saw Dr. José Miguel Latif, now follows with PCP (updated 1/13/2021)    Raynauds syndrome     Short-term memory loss 2002    due to aneurysm per pt     Past Surgical History:   Procedure Laterality Date    ANKLE SURGERY Right 2010    surgery x2    [address]    in left 5th digit    BRAIN SURGERY  2002    aneurysm repair    [address]  12/2020    Diverticulosis - Dr. Geoffrey Godoy, [facility] 11/2020    \"Took biopsy and was irregular\", has to repeat in Feb 2021    NERVE SURGERY Left 1990's    repair left elbow    SHOULDER ARTHROSCOPY Right 1/19/2021    RIGHT SHOULDER ARTHROSCOPY, SUBACROMIAL DECOMPRESSION DISTAL CLAVICLE RESECTION ROTATOR CUFF REPAIR DEBRIDEMENT performed by Og Yanez DO at [address] [place] [address]      vein stripping on right x2 / vein stripping on left x8     Family History   Problem Relation Age of Onset    Diabetes Mother     Diabetes Father     Heart Disease Father      Social History     Socioeconomic History    Marital status:      Spouse name: Not on file    Number of children: Not on file    Years of education: Not on file    Highest education level: Not on file   Occupational History    Not on file   Tobacco Use    Smoking status: Never    Smokeless tobacco: Never   Vaping Use    Vaping Use: Never used   Substance and Sexual Activity    Alcohol use: Yes     Comment: Rarely - 1 time a year    Drug use: No    Sexual activity: Not on file   Other Topics Concern    Not on file   Social History Narrative    Not on file     Social Determinants of Health     Financial Resource Strain: Not on file   Food Insecurity: Not on file   Transportation Needs: Not on file   Physical Activity: Not on file   Stress: Not on file   Social Connections: Not on file   Intimate Partner Violence: Not on file   Housing Stability: Not on file     No current facility-administered medications for this encounter.      Current Outpatient Medications   Medication Sig Dispense Refill    hydroxychloroquine (PLAQUENIL) 200 MG tablet Take 1 tablet by mouth 2 times daily 60 tablet 2    Continuous Blood Gluc Sensor (DEXCOM G6 SENSOR) MISC       insulin aspart (NOVOLOG FLEXPEN) 100 UNIT/ML injection pen COVER WITH LOW DOSE SLIDING SCALE APPROXIMATELY 20 TO 30 MINUTES AFTER START OF MEAL 3 TIMES DAILY -MAX OF 24 UNITS DAILY      insulin glargine (LANTUS SOLOSTAR) 100 UNIT/ML injection pen INJECT 60 UNITS UNDER THE SKIN EVERY EVENING. OZEMPIC, 0.25 OR 0.5 MG/DOSE, 2 MG/1.5ML SOPN       losartan (COZAAR) 100 MG tablet Take 100 mg by mouth daily      Ubrogepant (UBRELVY) 100 MG TABS TAKE 1 TABLET BY MOUTH AS NEEDED FOR MIGRAINE. IF HEADACHE RECURS OR DOES NOT IMPROVE, AN ADDITIONAL DOSE MAY BE TAKEN AFTER 2 HOURS.  DO NOT      methocarbamol (ROBAXIN) 500 MG tablet Take 1 tablet by mouth 4 times daily for 10 days      naproxen (NAPROSYN) 500 MG tablet Take 1 tablet by mouth 2 times daily (with meals) 60 tablet 0    medroxyPROGESTERone (DEPO-PROVERA) 150 MG/ML injection       oxybutynin (DITROPAN) 5 MG tablet       fluticasone (FLONASE) 50 MCG/ACT nasal spray       losartan-hydroCHLOROthiazide (HYZAAR) 100-25 MG per tablet       doxycycline hyclate (VIBRA-TABS) 100 MG tablet       doxepin (SINEQUAN) 50 MG capsule       clotrimazole-betamethasone (LOTRISONE) 1-0.05 % cream       famotidine (PEPCID) 20 MG tablet       meloxicam (MOBIC) 15 MG tablet       doxazosin (CARDURA) 4 MG tablet       Rimegepant Sulfate (NURTEC) 75 MG TBDP Take by mouth      TRULICITY 1.5 ML/3.8RK SOPN       oxybutynin (DITROPAN-XL) 5 MG extended release tablet Take 5 mg by mouth 2 times daily      losartan (COZAAR) 100 MG tablet Take 100 mg by mouth daily      traZODone (DESYREL) 100 MG tablet Take 50 mg by mouth nightly as needed for Sleep      ibuprofen (ADVIL;MOTRIN) 800 MG tablet Take 800 mg by mouth every 6 hours as needed for Pain      glipiZIDE (GLUCOTROL) 10 MG tablet Take 10 mg by mouth daily      tiZANidine (ZANAFLEX) 4 MG tablet Take 4 mg by mouth every 8 hours as needed      rosuvastatin (CRESTOR) 20 MG tablet Take 20 mg by mouth daily      acyclovir (ZOVIRAX) 400 MG tablet       magnesium oxide (MAG-OX) 400 MG tablet Take 400 mg by mouth daily      potassium chloride SA (K-DUR;KLOR-CON M) 20 MEQ tablet Take 20 mEq by mouth 2 times daily       vitamin D (CHOLECALCIFEROL) 250 MCG (62386 UT) CAPS capsule Take 10,000 Units by mouth daily (with breakfast)       butalbital-acetaminophen-caffeine (FIORICET) per tablet Take 1 tablet by mouth every 4 hours as needed for Pain. 180 tablet 0     Allergies   Allergen Reactions    Nubain [Nalbuphine Hcl]      Seizures      Secobarbital Sodium Anaphylaxis     SEIZURE    Sulfa Antibiotics Anaphylaxis    Adhesive Tape Rash    Demerol      seizures    Indocin [Indomethacin]        Nursing Notes Reviewed    Physical Exam:     ED Triage Vitals [02/24/23 1845]   Enc Vitals Group      BP (!) 219/105      Heart Rate 84      Resp 16      Temp 98 °F (36.7 °C)      Temp src       SpO2 97 %      Weight 220 lb (99.8 kg)      Height 5' 4\" (1.626 m)      Head Circumference       Peak Flow       Pain Score       Pain Loc       Pain Edu? Excl. in [address]? GENERAL APPEARANCE: Awake and alert. Cooperative. HEAD: Normocephalic. Atraumatic. EYES: EOM's grossly intact. Sclera anicteric. ENT: Mucous membranes are moist. Tolerates saliva. No trismus. NECK: Supple. No meningismus. Trachea midline. HEART: RRR. Radial pulses 2+. LUNGS: Respirations unlabored. CTAB  ABDOMEN: Soft. Non-tender. No guarding or rebound. EXTREMITIES: No acute deformities. SKIN: Warm and dry.   NEUROLOGICAL:   Erin Green's NIH Stroke Scale at 9:13 PM is:  Level of Consciousness:  0 - alert; keenly responsive    LOC Questions:  0 - answers both questions correctly    LOC Commands:  0 - performs both tasks correctly    Best Gaze:  0 - normal    Visual Fields:  0 - no visual loss    Facial Palsy:  0 - normal symmetric movement    Motor-Arm-Left:  0 - no drift, limb holds 90 (or 45) degrees for full 10 seconds    Motor-Leg-Left:  0 - no drift; leg holds 30 degree position for full 5 seconds    Motor-Arm-Right:  0 - no drift, limb holds 90 (or 45) degrees for full 10 seconds    Motor-Leg-Right:  0 - no drift; leg holds 30 degree position for full 5 seconds    Limb Ataxia:  0 - absent    Sensory:  1 - mild to moderate sensory loss; patient feels pinprick is less sharp or is dull on the affected side; there is a loss of superficial pain with pinprick but patient is aware of being touched     Best Language:  0 - no aphasia, normal    Dysarthria:  0 - normal    Extinction and Inattention:  0 - no abnormality    NIHSS of 1  PSYCHIATRIC: Normal mood.     I have reviewed and interpreted all of the currently available lab results from this visit (if applicable):  Results for orders placed or performed during the hospital encounter of 84/44/84   Basic Metabolic Panel   Result Value Ref Range    Sodium 136 135 - 145 MMOL/L    Potassium 4.3 3.5 - 5.1 MMOL/L    Chloride 99 99 - 110 mMol/L    CO2 26 21 - 32 MMOL/L    Anion Gap 11 4 - 16    BUN 10 6 - 23 MG/DL    Creatinine 0.7 0.6 - 1.1 MG/DL    Est, Glom Filt Rate >60 >60 mL/min/1.73m2    Glucose 391 (H) 70 - 99 MG/DL    Calcium 9.0 8.3 - 10.6 MG/DL   CBC with Auto Differential   Result Value Ref Range    WBC 9.9 4.0 - 10.5 K/CU MM    RBC 4.92 4.2 - 5.4 M/CU MM    Hemoglobin 13.3 12.5 - 16.0 GM/DL    Hematocrit 42.7 37 - 47 %    MCV 86.8 78 - 100 FL    MCH 27.0 27 - 31 PG    MCHC 31.1 (L) 32.0 - 36.0 %    RDW 13.3 11.7 - 14.9 %    Platelets 734 036 - 193 K/CU MM    MPV 9.7 7.5 - 11.1 FL    Differential Type AUTOMATED DIFFERENTIAL     Segs Relative 43.9 36 - 66 %    Lymphocytes % 44.8 (H) 24 - 44 %    Monocytes % 8.5 (H) 0 - 4 %    Eosinophils % 1.9 0 - 3 %    Basophils % 0.7 0 - 1 %    Segs Absolute 4.3 K/CU MM    Lymphocytes Absolute 4.4 K/CU MM    Monocytes Absolute 0.8 K/CU MM    Eosinophils Absolute 0.2 K/CU MM    Basophils Absolute 0.1 K/CU MM    Nucleated RBC % 0.0 %    Total Nucleated RBC 0.0 K/CU MM    Total Immature Neutrophil 0.02 K/CU MM    Immature Neutrophil % 0.2 0 - 0.43 %   Troponin   Result Value Ref Range    Troponin T <0.010 <0.01 NG/ML   Protime-INR   Result Value Ref Range    Protime 10.9 (L) 11.7 - 14.5 SECONDS    INR 0.85 INDEX POCT Glucose   Result Value Ref Range    QC OK? 341    POCT Glucose   Result Value Ref Range    POC Glucose 341 (H) 70 - 99 MG/DL   EKG 12 Lead   Result Value Ref Range    Ventricular Rate 82 BPM    Atrial Rate 82 BPM    P-R Interval 160 ms    QRS Duration 94 ms    Q-T Interval 382 ms    QTc Calculation (Bazett) 446 ms    P Axis 50 degrees    R Axis -3 degrees    T Axis 25 degrees    Diagnosis       Normal sinus rhythm  Voltage criteria for left ventricular hypertrophy  Abnormal ECG  When compared with ECG of 14-NOV-2022 17:58,  No significant change was found        Radiographs (if obtained):  [] The following radiograph was interpreted by myself in the absence of a radiologist:  [x] Radiologist's Report Reviewed:    12 lead EKG per my interpretation:  Normal Sinus Rhythm   Rate: 82  QTc is  normal  There is nonspecific T wave changes appreciated. There is no specific ST wave changes appreciated. Prior EKG to compare with was available and is similar      Critical Care: Total critical care time today provided was 34 minutes. This excludes seperately billable procedure. Critical care time provided for potential or impending CNS compromise that required close evaluation and/or intervention with concern for patient decompensation. MDM:   1. Physician evaluation performed at:  1845  2. Stroke alert called at:  1845  3. CT results obtained at:  1855  4. Decision was made that TNKase is NOT indicated in consultation with [facility] Stroke Neurologist, Dr. Naomi Cifuentes.           Patient was given the following medications:  Medications   iopamidol (ISOVUE-370) 76 % injection 75 mL (75 mLs IntraVENous Given 2/24/23 1911)   metoclopramide (REGLAN) injection 10 mg (10 mg IntraVENous Given 2/24/23 2009)   diphenhydrAMINE (BENADRYL) injection 25 mg (25 mg IntraVENous Given 2/24/23 2009)       Labs ordered and results as above (interpreted by myself), CBC showed no significant concerning anemia, chemistry showed no significant concerning electrolyte derangements or acute renal failure, hepatic function panel showed no transaminitis to suggest hepatic dysfunction/inflammation, and troponin was either normal or close to baseline so lower suspicion for acute MI, myocarditis, pericarditis  Imaging interpreted and reviewed by myself: CTA head and neck did show some irregularities but this appears to be not related to what is going on today after discussion with the neurologist.  CT head did not reveal any acute bleed and ultrasound of the left leg showed no acute DVT  EKG interpreted by myself as above, not acutely concerning    Discussion with Other Profesionals : Admitting Team Dr. Ambrocio    Social Determinants: None    Records Reviewed : Outpatient Notes history of prior CVA and aneurysm that required clipping at [facility]    Chronic conditions affecting care:  Prior CVA and aneurysm clipping as well as dissection      Disposition Considerations (tests considered but not done, Shared Decision Making, Pt Expectation of Test or Tx.):  After discussion with Dr. Gilliam she recommended hospitalization with neuro evaluation.  The patient was agreeable with this plan of care.  She recommended against MRI given that she does have a clipping but did request at least observation here.  Patient had all questions and concerns answered at this time.  I suspect that the left leg is probably secondary to strain as opposed to acute neurovascular injury.  I went over this with the patient as well.  She is can continue supportive care measures.    Dr. Gilliam did mention that this potentially could be related to a complex migraine so recommended that we did treat this.  Patient was given Reglan and Benadryl.    Disposition: Hospitalized    I am the Primary Clinician of Record.    Clinical Impression:  1. Numbness    2. Left leg pain      (Please note that portions of this note may have been completed with a voice recognition program. Efforts were made to edit the  dictations but occasionally words are mis-transcribed.)    MD Naomi Mancuso MD  02/24/23 1604       Naomi Hayden MD  02/24/23 6961

## 2023-02-25 NOTE — PROGRESS NOTES
I went to patients room and talked with her about her aneurysm clips. She states she had them implanted 20 years ago at [facility]/doctor gave her no information on what was implanted. She states she came her 2 years ago and we tried researching but since it had been so long nobody had records of it. She states she has went several facilities and nobody will do MRI  due to no information. She states she told ordering doctor and nurse this.   Without any information on what is implanted, we cannot go forward with test.

## 2023-02-26 NOTE — PROGRESS NOTES
Physical Therapy  Facility/Department: [address] [place] [address]  Physical Therapy Initial Assessment    Name: Robert Vu  : 1970  MRN: [id number]  Date of Service: 2023    Discharge Recommendations:  Home independently, Home with assist PRN        Functional Outcome Measure:    Acute Care Index of Function (ACIF):    Score: 1.00 (0.71 or greater = appropriate for home independently/with assist prn)            Assessment   Assessment: Pt is a 48 y.o. female with medical history, surgical history, co-morbidities, and personal factors including Arthritis, Asthma, Diabetes mellitus ([facility] [address] Ehler's-Danlos syndrome, Fibromyalgia, Hx of blood clots, Hypertension, Middle cerebral aneurysm, Mitral valve regurgitation, Raynauds syndrome, Short-term memory loss, brain surgery (); Vein Surgery;  section (); Tubal ligation (); Nerve Surgery (Left, ); Biceps tendon repair (Left, ); Ankle surgery (Right, ); Colonoscopy (2020); Endoscopy, colon, diagnostic (2020); Tonsillectomy (); and Shoulder arthroscopy (Right, 2021) with admission for left leg pain and right facial numbness. Prior to admission, pt was independent with functional mobility and ADLs. Examination of body systems reveals good strength, good balance, and safe and steady transfers, ambulation, and stair negotiation which is at or near baseline for this patient. Therapy Prognosis: Good  Decision Making: Low Complexity  Clinical Presentation: stable from an acute care PT standpoint  No Skilled PT: No PT goals identified  Requires PT Follow-Up: No  Activity Tolerance  Activity Tolerance: Patient tolerated evaluation without incident     Plan   Physcial Therapy Plan  General Plan: Discharge with evaluation only  Safety Devices  Type of Devices:  All fall risk precautions in place, Bed alarm in place, Left in bed, Call light within reach, Gait belt, Nurse notified Restrictions  Restrictions/Precautions  Restrictions/Precautions: General Precautions     Subjective   General  Chart Reviewed: Yes  Patient assessed for rehabilitation services?: Yes  Family / Caregiver Present: No  Follows Commands: Within Functional Limits  Subjective  Subjective: pain: denies; 0/10         Social/Functional History  Social/Functional History  Lives With: Spouse  Type of Home: House  Home Layout:  (full flight of stairs)  Has the patient had two or more falls in the past year or any fall with injury in the past year?: No  ADL Assistance: Independent  Homemaking Assistance: Independent  Ambulation Assistance: Independent  Transfer Assistance: Independent  Active : Yes  Occupation: Full time employment  Vision/Hearing  Vision  Vision: Within Functional Limits  Hearing  Hearing: Within functional limits    Cognition   Orientation  Overall Orientation Status: Within Functional Limits  Cognition  Overall Cognitive Status: WFL     Objective   Heart Rate: 78  Heart Rate Source: Monitor  BP: (!) 157/88  BP Location: Left upper arm  BP Method: Automatic  Patient Position: Semi fowlers  MAP (Calculated): 111  Resp: 29        Gross Assessment  Sensation: Intact (BLEs)        Strength RLE  Comment: 5/5 throughout  Strength LLE  Comment: 5/5 throughout           Bed mobility  Supine to Sit: Independent  Sit to Supine: Independent  Transfers  Sit to Stand: Independent  Stand to Sit: Independent  Ambulation  Surface: Level tile  Device: No Device  Assistance: Independent;Supervision  Quality of Gait: normal gait speed  Distance: 100 feet + 400 feet  Comments: with initial verbal cues for directions in order to navigate hallway  Stairs/Curb  Stairs?: Yes  Stairs  # Steps : 14  Device: No Device  Assistance: Supervision; Independent  Comment: with initial verbal cues to use \"up with good/down with the bad\" stair negotiation technique if L knee pain persists (patient denies L knee pain at this time) Balance  Posture: Fair  Sitting - Static: Good  Sitting - Dynamic: Good  Standing - Static: Good  Standing - Dynamic: Good             AM-PAC Score  AM-PAC Inpatient Mobility Raw Score : 24 (02/25/23 2051)  AM-PAC Inpatient T-Scale Score : 61.14 (02/25/23 2051)  Mobility Inpatient CMS 0-100% Score: 0 (02/25/23 2051)  Mobility Inpatient CMS G-Code Modifier : [facility] (02/25/23 2051)            Education  Patient Education  Education Given To: Patient  Education Provided: Role of Therapy  Education Method: Demonstration;Verbal  Education Outcome: Verbalized understanding;Demonstrated understanding      Time In: 6696  Time Out: 2846  Total Treatment Time: 2655 Keny Knapp PT, DPT  License #: [id number]

## 2023-02-28 ENCOUNTER — HOSPITAL ENCOUNTER (OUTPATIENT)
Dept: PHYSICAL THERAPY | Age: 53
Setting detail: THERAPIES SERIES
Discharge: HOME | End: 2023-02-28
Payer: COMMERCIAL

## 2023-03-30 ENCOUNTER — HOSPITAL ENCOUNTER (OUTPATIENT)
Dept: CT IMAGING | Age: 53
Discharge: HOME | End: 2023-03-30
Payer: COMMERCIAL

## 2023-03-30 DIAGNOSIS — M76.72 PERONEAL TENDINITIS OF LEFT LOWER EXTREMITY: ICD-10-CM

## 2023-03-30 DIAGNOSIS — M25.572 ARTHRALGIA OF LEFT ANKLE OR FOOT: ICD-10-CM

## 2023-03-30 DIAGNOSIS — S93.402D SPRAIN OF LIGAMENT OF LEFT ANKLE, SUBSEQUENT ENCOUNTER: ICD-10-CM

## 2023-03-30 LAB
EGFR, POC: >60 ML/MIN/1.73M2
POC CREATININE: 0.7 MG/DL (ref 0.6–1.1)

## 2023-03-30 PROCEDURE — 6360000004 HC RX CONTRAST MEDICATION: Performed by: PODIATRIST

## 2023-03-30 PROCEDURE — 82565 ASSAY OF CREATININE: CPT

## 2023-03-30 PROCEDURE — 73701 CT LOWER EXTREMITY W/DYE: CPT

## 2023-03-30 RX ADMIN — IOPAMIDOL 75 ML: 755 INJECTION, SOLUTION INTRAVENOUS at 09:30

## 2023-05-10 ENCOUNTER — OFFICE VISIT (OUTPATIENT)
Dept: NEUROLOGY | Age: 53
End: 2023-05-10

## 2023-05-10 VITALS
HEART RATE: 74 BPM | BODY MASS INDEX: 38.93 KG/M2 | WEIGHT: 228 LBS | DIASTOLIC BLOOD PRESSURE: 90 MMHG | HEIGHT: 64 IN | OXYGEN SATURATION: 98 % | SYSTOLIC BLOOD PRESSURE: 152 MMHG

## 2023-05-10 DIAGNOSIS — I63.9 ISCHEMIC STROKE (HCC): Primary | ICD-10-CM

## 2023-05-10 DIAGNOSIS — G43.009 ATYPICAL MIGRAINE: ICD-10-CM

## 2023-05-10 DIAGNOSIS — I77.74 VERTEBRAL ARTERY DISSECTION (HCC): ICD-10-CM

## 2023-05-10 RX ORDER — ACYCLOVIR 400 MG/1
400 TABLET ORAL DAILY
COMMUNITY
Start: 2023-03-02

## 2023-05-10 RX ORDER — POTASSIUM CHLORIDE 1500 MG/1
40 TABLET, FILM COATED, EXTENDED RELEASE ORAL DAILY
COMMUNITY
Start: 2023-03-29

## 2023-05-10 RX ORDER — TRAZODONE HYDROCHLORIDE 100 MG/1
100 TABLET ORAL NIGHTLY
COMMUNITY
Start: 2023-04-25

## 2023-05-10 RX ORDER — UBROGEPANT 100 MG/1
TABLET ORAL
COMMUNITY
Start: 2023-05-05 | End: 2023-05-10 | Stop reason: ALTCHOICE

## 2023-05-10 RX ORDER — TIZANIDINE 4 MG/1
4 TABLET ORAL NIGHTLY
Qty: 30 TABLET | Refills: 3 | Status: SHIPPED | OUTPATIENT
Start: 2023-05-10

## 2023-05-10 RX ORDER — UBROGEPANT 100 MG/1
100 TABLET ORAL PRN
Qty: 10 TABLET | Refills: 2 | Status: SHIPPED | OUTPATIENT
Start: 2023-05-10

## 2023-05-10 NOTE — PROGRESS NOTES
5/10/23    Colton [address] 42  1970    Chief Complaint   Patient presents with    Follow-Up from [facility] MARKY  AMY     2/24/23        History of Present Illness  Bryn Osgood is a 48 y.o. female presenting today for hospital follow-up on 2/25/23 of: right facial pressure and numbness secondary to reaction of previous infarction v. Atypical migraine superimposed on hx of vertebral dissection in setting of EDS with CVA and uncontrolled DM2. CTA head/neck show the aneurysm on left MCA to be secured, but the right vertebral artery is dissected/ occluded chronically. She has daily headaches described as dull and achy pain on left side of face with some numbness and tingling in her left cheek. In 2003 had craniotomy to fix L MCA aneurysm- since then has had everyday dull headaches. Her migraines are described as throbbing all over her head with light sensitivity and nausea. They last longer than 4 hours. This happens 4 days per month. She follows with Dr Annie Lazar for her vertebral artery dissection 5/5/22. Per chart review  \"She was offered a catheter based angiogram to give complete confidence the left MCA aneurysm is secured, but CTA is high enough quality to be more than reasonably sure the clips are securing the aneurysm. No other intracranial aneurysms are found. She is not interested in a catheter based study unless absolutely needed. \"    She previously followed with Dr. Leyda Kaur and Nelson Muniz for her chronic migraines. She would like to stay local here at [facility] for management of her migraines due to location. She had a sleep study 2 years ago, did not have CECE. Last A1C 10.0 on 2/25/23. She follows with endocrinology at [facility].      Prevention:Robaxin, Duloxetine, Tizanidine, Gabapentin, Namenda, Propranolol, Pamelor  Abortive therapy:Fiorcet, Ubrelvy (triptans are contraindicated)  Current Outpatient Medications   Medication Sig Dispense Refill    UBRELVY 100 MG TABS       traZODone (DESYREL) 100 MG

## 2023-05-16 ENCOUNTER — OFFICE VISIT (OUTPATIENT)
Dept: RHEUMATOLOGY | Age: 53
End: 2023-05-16
Payer: COMMERCIAL

## 2023-05-16 ENCOUNTER — HOSPITAL ENCOUNTER (OUTPATIENT)
Age: 53
Discharge: HOME | End: 2023-05-16
Payer: COMMERCIAL

## 2023-05-16 VITALS
DIASTOLIC BLOOD PRESSURE: 70 MMHG | HEART RATE: 90 BPM | OXYGEN SATURATION: 96 % | BODY MASS INDEX: 37.76 KG/M2 | WEIGHT: 220 LBS | SYSTOLIC BLOOD PRESSURE: 120 MMHG

## 2023-05-16 DIAGNOSIS — M79.7 FIBROMYALGIA: ICD-10-CM

## 2023-05-16 DIAGNOSIS — M05.79 RHEUMATOID ARTHRITIS INVOLVING MULTIPLE SITES WITH POSITIVE RHEUMATOID FACTOR (HCC): ICD-10-CM

## 2023-05-16 DIAGNOSIS — M06.09 RHEUMATOID ARTHRITIS OF MULTIPLE SITES WITH NEGATIVE RHEUMATOID FACTOR (HCC): ICD-10-CM

## 2023-05-16 DIAGNOSIS — Z79.899 HIGH RISK MEDICATION USE: ICD-10-CM

## 2023-05-16 DIAGNOSIS — Z79.899 ENCOUNTER FOR MONITORING OF HYDROXYCHLOROQUINE THERAPY: ICD-10-CM

## 2023-05-16 DIAGNOSIS — M06.09 RHEUMATOID ARTHRITIS OF MULTIPLE SITES WITH NEGATIVE RHEUMATOID FACTOR (HCC): Primary | ICD-10-CM

## 2023-05-16 DIAGNOSIS — Z51.81 ENCOUNTER FOR MONITORING OF HYDROXYCHLOROQUINE THERAPY: ICD-10-CM

## 2023-05-16 LAB
ALBUMIN SERPL-MCNC: 4.1 GM/DL (ref 3.4–5)
ALP BLD-CCNC: 88 IU/L (ref 40–128)
ALT SERPL-CCNC: 18 U/L (ref 10–40)
ANION GAP SERPL CALCULATED.3IONS-SCNC: 10 MMOL/L (ref 4–16)
AST SERPL-CCNC: 22 IU/L (ref 15–37)
BILIRUB SERPL-MCNC: 0.6 MG/DL (ref 0–1)
BUN SERPL-MCNC: 8 MG/DL (ref 6–23)
CALCIUM SERPL-MCNC: 9 MG/DL (ref 8.3–10.6)
CHLORIDE BLD-SCNC: 103 MMOL/L (ref 99–110)
CO2: 23 MMOL/L (ref 21–32)
CREAT SERPL-MCNC: 0.7 MG/DL (ref 0.6–1.1)
GFR SERPL CREATININE-BSD FRML MDRD: >60 ML/MIN/1.73M2
GLUCOSE SERPL-MCNC: 107 MG/DL (ref 70–99)
HCT VFR BLD CALC: 44.7 % (ref 37–47)
HEMOGLOBIN: 13.9 GM/DL (ref 12.5–16)
MCH RBC QN AUTO: 27.3 PG (ref 27–31)
MCHC RBC AUTO-ENTMCNC: 31.1 % (ref 32–36)
MCV RBC AUTO: 87.8 FL (ref 78–100)
PDW BLD-RTO: 13.3 % (ref 11.7–14.9)
PLATELET # BLD: 355 K/CU MM (ref 140–440)
PMV BLD AUTO: 9.9 FL (ref 7.5–11.1)
POTASSIUM SERPL-SCNC: 4.4 MMOL/L (ref 3.5–5.1)
RBC # BLD: 5.09 M/CU MM (ref 4.2–5.4)
SODIUM BLD-SCNC: 136 MMOL/L (ref 135–145)
TOTAL PROTEIN: 6.8 GM/DL (ref 6.4–8.2)
WBC # BLD: 9 K/CU MM (ref 4–10.5)

## 2023-05-16 PROCEDURE — 1036F TOBACCO NON-USER: CPT | Performed by: STUDENT IN AN ORGANIZED HEALTH CARE EDUCATION/TRAINING PROGRAM

## 2023-05-16 PROCEDURE — 99215 OFFICE O/P EST HI 40 MIN: CPT | Performed by: STUDENT IN AN ORGANIZED HEALTH CARE EDUCATION/TRAINING PROGRAM

## 2023-05-16 PROCEDURE — 36415 COLL VENOUS BLD VENIPUNCTURE: CPT

## 2023-05-16 PROCEDURE — 85027 COMPLETE CBC AUTOMATED: CPT

## 2023-05-16 PROCEDURE — G8417 CALC BMI ABV UP PARAM F/U: HCPCS | Performed by: STUDENT IN AN ORGANIZED HEALTH CARE EDUCATION/TRAINING PROGRAM

## 2023-05-16 PROCEDURE — G8427 DOCREV CUR MEDS BY ELIG CLIN: HCPCS | Performed by: STUDENT IN AN ORGANIZED HEALTH CARE EDUCATION/TRAINING PROGRAM

## 2023-05-16 PROCEDURE — 3017F COLORECTAL CA SCREEN DOC REV: CPT | Performed by: STUDENT IN AN ORGANIZED HEALTH CARE EDUCATION/TRAINING PROGRAM

## 2023-05-16 PROCEDURE — 80053 COMPREHEN METABOLIC PANEL: CPT

## 2023-05-16 RX ORDER — PREDNISONE 1 MG/1
10 TABLET ORAL DAILY
Qty: 84 TABLET | Refills: 0 | Status: SHIPPED | OUTPATIENT
Start: 2023-05-16 | End: 2023-06-27

## 2023-05-16 RX ORDER — HYDROXYCHLOROQUINE SULFATE 200 MG/1
200 TABLET, FILM COATED ORAL 2 TIMES DAILY
Qty: 60 TABLET | Refills: 2 | Status: SHIPPED | OUTPATIENT
Start: 2023-05-16

## 2023-05-16 NOTE — PATIENT INSTRUCTIONS
Continue Plaquenil 1 tab twice daily   Start prednisone 1-2 tabs daily for moderate to severe pain  Get labs today   I will order MTX and folic acid after reviewing your labs  RTC in 6 weeks

## 2023-05-17 RX ORDER — FOLIC ACID 1 MG/1
1 TABLET ORAL DAILY
Qty: 90 TABLET | Refills: 1 | Status: SHIPPED | OUTPATIENT
Start: 2023-05-17

## 2023-05-19 ENCOUNTER — TELEPHONE (OUTPATIENT)
Dept: RHEUMATOLOGY | Age: 53
End: 2023-05-19

## 2023-05-19 NOTE — TELEPHONE ENCOUNTER
----- Message from Alan Sibley MD sent at 5/17/2023 12:11 PM EDT -----  Please notify patient that I have reviewed her results and methotrexate and folic acid reordered.   She should get future labs done on 6/30 prior to her next appointment

## 2023-05-22 ENCOUNTER — HOSPITAL ENCOUNTER (OUTPATIENT)
Dept: CT IMAGING | Age: 53
Discharge: HOME | End: 2023-05-22
Payer: COMMERCIAL

## 2023-05-22 DIAGNOSIS — I63.9 ISCHEMIC STROKE (HCC): ICD-10-CM

## 2023-05-22 DIAGNOSIS — G43.009 ATYPICAL MIGRAINE: ICD-10-CM

## 2023-05-22 DIAGNOSIS — I77.74 VERTEBRAL ARTERY DISSECTION (HCC): ICD-10-CM

## 2023-05-22 PROCEDURE — 70450 CT HEAD/BRAIN W/O DYE: CPT

## 2023-05-23 LAB — NUCLEAR IGG TITR SER IF: NORMAL {TITER}

## 2023-06-08 ENCOUNTER — APPOINTMENT (OUTPATIENT)
Dept: ULTRASOUND IMAGING | Age: 53
End: 2023-06-08
Payer: COMMERCIAL

## 2023-06-08 ENCOUNTER — HOSPITAL ENCOUNTER (EMERGENCY)
Age: 53
Discharge: HOME | End: 2023-06-08
Attending: EMERGENCY MEDICINE
Payer: COMMERCIAL

## 2023-06-08 VITALS
BODY MASS INDEX: 37.76 KG/M2 | TEMPERATURE: 97.4 F | RESPIRATION RATE: 16 BRPM | DIASTOLIC BLOOD PRESSURE: 75 MMHG | SYSTOLIC BLOOD PRESSURE: 122 MMHG | OXYGEN SATURATION: 100 % | WEIGHT: 220 LBS | HEART RATE: 71 BPM

## 2023-06-08 DIAGNOSIS — M54.32 SCIATICA OF LEFT SIDE: Primary | ICD-10-CM

## 2023-06-08 PROCEDURE — 93971 EXTREMITY STUDY: CPT

## 2023-06-08 PROCEDURE — 6360000002 HC RX W HCPCS: Performed by: EMERGENCY MEDICINE

## 2023-06-08 PROCEDURE — 6370000000 HC RX 637 (ALT 250 FOR IP): Performed by: EMERGENCY MEDICINE

## 2023-06-08 RX ORDER — LIDOCAINE 4 G/G
1 PATCH TOPICAL ONCE
Status: DISCONTINUED | OUTPATIENT
Start: 2023-06-08 | End: 2023-06-08 | Stop reason: HOSPADM

## 2023-06-08 RX ORDER — KETOROLAC TROMETHAMINE 30 MG/ML
30 INJECTION, SOLUTION INTRAMUSCULAR; INTRAVENOUS ONCE
Status: COMPLETED | OUTPATIENT
Start: 2023-06-08 | End: 2023-06-08

## 2023-06-08 RX ORDER — NAPROXEN 500 MG/1
500 TABLET ORAL 2 TIMES DAILY WITH MEALS
Qty: 60 TABLET | Refills: 0 | Status: SHIPPED | OUTPATIENT
Start: 2023-06-08

## 2023-06-08 RX ORDER — METHOCARBAMOL 500 MG/1
500 TABLET, FILM COATED ORAL 4 TIMES DAILY
Qty: 40 TABLET | Refills: 0 | Status: SHIPPED | OUTPATIENT
Start: 2023-06-08 | End: 2023-06-18

## 2023-06-08 RX ORDER — OXYCODONE HYDROCHLORIDE AND ACETAMINOPHEN 5; 325 MG/1; MG/1
1 TABLET ORAL ONCE
Status: COMPLETED | OUTPATIENT
Start: 2023-06-08 | End: 2023-06-08

## 2023-06-08 RX ORDER — LIDOCAINE 50 MG/G
1 PATCH TOPICAL DAILY
Qty: 10 PATCH | Refills: 0 | Status: SHIPPED | OUTPATIENT
Start: 2023-06-08 | End: 2023-06-18

## 2023-06-08 RX ADMIN — OXYCODONE HYDROCHLORIDE AND ACETAMINOPHEN 1 TABLET: 5; 325 TABLET ORAL at 18:03

## 2023-06-08 RX ADMIN — KETOROLAC TROMETHAMINE 30 MG: 30 INJECTION, SOLUTION INTRAMUSCULAR; INTRAVENOUS at 18:02

## 2023-06-08 NOTE — ACP (ADVANCE CARE PLANNING)
Patient does not have any ACP documents/Medical Power of . LSW notes hospital will follow [place]'s Next of Kin hierarchy in the following descending order for priority:    Guardian  Spouse  [de-identified] of adult Children  Parents  [de-identified] of adult Siblings  Nearest Relative not described above    Per [place]'s Next of Kin hierarchy: Patients' child will be [address].

## 2023-06-08 NOTE — ED PROVIDER NOTES
Triage Chief Complaint:   Leg Pain    [place]:  Ren Hernández is a 48 y.o. female that presents with left leg pain. Patient was in baseline state of health until today when the above started. Patient reports severe left leg pain that is predictably up from her left lower leg into her left low back. Patient does describe shooting pain extremely severe. Patient does have patient with sciatica on the right leg but never the left leg. Patient does have history of DVT and this is her primary concern. Patient is on aspirin only for anticoagulation. Patient does report that her left lower extremity is always a bit more swollen that she has had vein surgery on her left leg with venous stripping at the age of 25. No numbness or weakness. No incontinence of urine or stool. No IV drug abuse. No personal history of cancer. No fall or trauma.     ROS:  General:  No fevers, no chills  Respiratory:  No shortness of breath  Neurologic:  No numbness, no weakness  Extremities:  + chronic edema, + pain  Skin:  No rash  Psych: No axienty    Past Medical History:   Diagnosis Date    Arthritis     Neck    Asthma     PCP - last episode: 2013    Diabetes mellitus ([facility] [address]     Type II - follows with PCP    Ehler's-Danlos syndrome Dx: 2000    PCP    Fibromyalgia     Hx of blood clots 2000    Left lower leg - unsure of treatment    Hypertension     Middle cerebral aneurysm 2002    surgery @ [facility]    Mitral valve regurgitation     Originally saw Dr. Pati Swift, now follows with PCP (updated 1/13/2021)    Raynauds syndrome     Short-term memory loss 2002    due to aneurysm per pt     Past Surgical History:   Procedure Laterality Date    ANKLE SURGERY Right 2010    surgery x2    BICEPS TENDON REPAIR Left 1998    in left 5th digit    BRAIN SURGERY  2002    aneurysm repair    [facility]    COLONOSCOPY  12/2020    Diverticulosis - Dr. Vivien Garces, COLON, DIAGNOSTIC  11/2020    \"Took biopsy and was irregular\", has to

## 2023-06-23 DIAGNOSIS — M06.09 RHEUMATOID ARTHRITIS OF MULTIPLE SITES WITH NEGATIVE RHEUMATOID FACTOR (HCC): ICD-10-CM

## 2023-06-26 ENCOUNTER — HOSPITAL ENCOUNTER (OUTPATIENT)
Age: 53
Discharge: HOME | End: 2023-06-26
Payer: COMMERCIAL

## 2023-06-26 DIAGNOSIS — Z79.899 HIGH RISK MEDICATION USE: ICD-10-CM

## 2023-06-26 LAB
ALBUMIN SERPL-MCNC: 3.8 GM/DL (ref 3.4–5)
ALP BLD-CCNC: 85 IU/L (ref 40–128)
ALT SERPL-CCNC: 25 U/L (ref 10–40)
ANION GAP SERPL CALCULATED.3IONS-SCNC: 12 MMOL/L (ref 4–16)
AST SERPL-CCNC: 26 IU/L (ref 15–37)
BILIRUB SERPL-MCNC: 0.5 MG/DL (ref 0–1)
BUN SERPL-MCNC: 8 MG/DL (ref 6–23)
CALCIUM SERPL-MCNC: 9 MG/DL (ref 8.3–10.6)
CHLORIDE BLD-SCNC: 103 MMOL/L (ref 99–110)
CO2: 23 MMOL/L (ref 21–32)
CREAT SERPL-MCNC: 0.6 MG/DL (ref 0.6–1.1)
CRP SERPL HS-MCNC: 23.4 MG/L
GFR SERPL CREATININE-BSD FRML MDRD: >60 ML/MIN/1.73M2
GLUCOSE SERPL-MCNC: 102 MG/DL (ref 70–99)
HCT VFR BLD CALC: 40.8 % (ref 37–47)
HEMOGLOBIN: 12.9 GM/DL (ref 12.5–16)
MCH RBC QN AUTO: 27.9 PG (ref 27–31)
MCHC RBC AUTO-ENTMCNC: 31.6 % (ref 32–36)
MCV RBC AUTO: 88.3 FL (ref 78–100)
PDW BLD-RTO: 13.7 % (ref 11.7–14.9)
PLATELET # BLD: 381 K/CU MM (ref 140–440)
PMV BLD AUTO: 10 FL (ref 7.5–11.1)
POTASSIUM SERPL-SCNC: 4.2 MMOL/L (ref 3.5–5.1)
RBC # BLD: 4.62 M/CU MM (ref 4.2–5.4)
SODIUM BLD-SCNC: 138 MMOL/L (ref 135–145)
TOTAL PROTEIN: 6.3 GM/DL (ref 6.4–8.2)
WBC # BLD: 8.7 K/CU MM (ref 4–10.5)

## 2023-06-26 PROCEDURE — 36415 COLL VENOUS BLD VENIPUNCTURE: CPT

## 2023-06-26 PROCEDURE — 86140 C-REACTIVE PROTEIN: CPT

## 2023-06-26 PROCEDURE — 85027 COMPLETE CBC AUTOMATED: CPT

## 2023-06-26 PROCEDURE — 80053 COMPREHEN METABOLIC PANEL: CPT

## 2023-07-04 NOTE — PROGRESS NOTES
medication in this disease process. I also reviewed potential methotrexate side effects. These include but not limited to hair loss, stomatitis, upset stomach, liver inflammation - we suggest abstinence from alcohol - and bone marrow suppression. This will require lab monitoring for potential toxicity. Women should not get pregnant and men should stop this medication before pregnancy/conception is attempted due to birth defects. I discussed the rational for folic acid with MTX. This will require q4 week monitoring of labs x 3 months, then q12 weeks thereafter for potential toxicity. -     Comprehensive Metabolic Panel; Future  -     CBC; Future    Encounter for monitoring of hydroxychloroquine therapy  FELICE  Discussed hydroxychloroquine use with the patient including dosing of medication. We discussed the most common possible side effects to include nausea and diarrhea, which often improve with time or by taking the medication with food. Less common side effects include skin rashes, changes in skin pigment (such as darkening or dark spots) or hair changes (bleaching or thinning of hair), and weakness. Rarely, hydroxychloroquine can lead to anemia in some individuals. Lastly, we discussed the rare risk of visual loss which is greatest risk in those taking higher doses for long periods. The patient was advised to have regular annual eye examinations by an ophthalmologist to evaluate for possible hydroxychloroquine toxicity of the eye. The risk of hydroxychloroquine retinal toxicity is dependent on daily dose and duration of use. At recommended doses (up to 5 mg/kg/day), the risk of toxicity up to 5 years is under 1% and up to 10 years is under 2%, but rises to almost 20% after 20 years. Fibromyalgia  WPI 17 SS 6    -Discussed about the diagnosis of fibromyalgia/ amplified pain: educated the patient about the condition.  Explained that management needs a multidisciplinary approach  - Reinforced about

## 2023-07-05 ENCOUNTER — TELEPHONE (OUTPATIENT)
Dept: RHEUMATOLOGY | Age: 53
End: 2023-07-05

## 2023-07-06 ENCOUNTER — OFFICE VISIT (OUTPATIENT)
Dept: RHEUMATOLOGY | Age: 53
End: 2023-07-06
Payer: COMMERCIAL

## 2023-07-06 VITALS
HEIGHT: 64 IN | WEIGHT: 216 LBS | BODY MASS INDEX: 36.88 KG/M2 | OXYGEN SATURATION: 99 % | SYSTOLIC BLOOD PRESSURE: 122 MMHG | HEART RATE: 80 BPM | DIASTOLIC BLOOD PRESSURE: 88 MMHG

## 2023-07-06 DIAGNOSIS — Z79.899 HIGH RISK MEDICATION USE: ICD-10-CM

## 2023-07-06 DIAGNOSIS — M79.7 FIBROMYALGIA: ICD-10-CM

## 2023-07-06 DIAGNOSIS — Z51.81 ENCOUNTER FOR MONITORING OF HYDROXYCHLOROQUINE THERAPY: ICD-10-CM

## 2023-07-06 DIAGNOSIS — Z79.899 ENCOUNTER FOR MONITORING OF HYDROXYCHLOROQUINE THERAPY: ICD-10-CM

## 2023-07-06 DIAGNOSIS — M06.09 RHEUMATOID ARTHRITIS OF MULTIPLE SITES WITH NEGATIVE RHEUMATOID FACTOR (HCC): Primary | ICD-10-CM

## 2023-07-06 PROCEDURE — 3017F COLORECTAL CA SCREEN DOC REV: CPT | Performed by: STUDENT IN AN ORGANIZED HEALTH CARE EDUCATION/TRAINING PROGRAM

## 2023-07-06 PROCEDURE — G8417 CALC BMI ABV UP PARAM F/U: HCPCS | Performed by: STUDENT IN AN ORGANIZED HEALTH CARE EDUCATION/TRAINING PROGRAM

## 2023-07-06 PROCEDURE — 1036F TOBACCO NON-USER: CPT | Performed by: STUDENT IN AN ORGANIZED HEALTH CARE EDUCATION/TRAINING PROGRAM

## 2023-07-06 PROCEDURE — 99214 OFFICE O/P EST MOD 30 MIN: CPT | Performed by: STUDENT IN AN ORGANIZED HEALTH CARE EDUCATION/TRAINING PROGRAM

## 2023-07-06 PROCEDURE — G8428 CUR MEDS NOT DOCUMENT: HCPCS | Performed by: STUDENT IN AN ORGANIZED HEALTH CARE EDUCATION/TRAINING PROGRAM

## 2023-07-06 NOTE — PATIENT INSTRUCTIONS
Continue Plaquenil 1 tab twice daily  Increase methotrexate to 8 pills every 7 days  Take folic acid 1 mg daily  Get labs on 8/30 prior to next visit   RTC in 2 months

## 2023-08-09 DIAGNOSIS — M05.79 RHEUMATOID ARTHRITIS INVOLVING MULTIPLE SITES WITH POSITIVE RHEUMATOID FACTOR (HCC): ICD-10-CM

## 2023-08-14 ENCOUNTER — HOSPITAL ENCOUNTER (OUTPATIENT)
Dept: PHYSICAL THERAPY | Age: 53
Setting detail: THERAPIES SERIES
Discharge: HOME | End: 2023-08-14
Payer: COMMERCIAL

## 2023-08-14 PROCEDURE — 97110 THERAPEUTIC EXERCISES: CPT

## 2023-08-14 PROCEDURE — 97162 PT EVAL MOD COMPLEX 30 MIN: CPT

## 2023-08-14 RX ORDER — HYDROXYCHLOROQUINE SULFATE 200 MG/1
200 TABLET, FILM COATED ORAL 2 TIMES DAILY
Qty: 60 TABLET | Refills: 0 | Status: SHIPPED | OUTPATIENT
Start: 2023-08-14

## 2023-08-14 ASSESSMENT — PAIN SCALES - GENERAL: PAINLEVEL_OUTOF10: 6

## 2023-08-14 NOTE — PLAN OF CARE
Outpatient Physical Therapy                  [x] Phone: [phone]   Fax: [phone]    Pediatric Therapy                                    [] Phone: [phone]   Fax: [phone]  Pediatric [facility]                                      [] Phone: [phone]   Fax: [phone]      To: No ref. provider found Dr. Paulina Oleary   From: Kaitlin Bonilla, PT,  Patient: Mirella Gipson       : 1970  Diagnosis: Lumbar disc herniation radiculopathy, spondylolisthesis L4-5   Treatment Diagnosis: low back pain, radiculopathy   Date: 2023    Physical Therapy Certification/Re-Certification Form  Dear Dr. Nickie Gabriel   The following patient has been evaluated for physical therapy services and for therapy to continue, Please review the attached evaluation and/or summary of the patient's plan of care, and verify that you agree therapy should continue by signing the attached document and sending it back to our office. Patient is a  47 yo female who presents with chronic worsening low back with onset of LLE radicalar symtpoms which impacts on adls;patient's goal is to decrease pain ;patient reports that  pain  limits activities including standing, walking, working, sitting, sleeping, lifting, ; PT to address patient's goals, impairments and activity limitations with skilled interventions checked in plan of care;patient's level of function prior to onset of  symptoms was independent; did not observe any barriers to learning during PT eval; learning preferences include demonstration, practice, and handouts; patient expressed understanding of HEP; patient appears to be motivated to participate in an active PT program and to be compliant with HEP expectations;patient assisted in developing treatment plan and goals; no DME is currently being used; Pt is in agreement with the treatment plan and goals.   Pt treatment will include multiple approaches to maximize benefit, including demonstration, verbal and tactile

## 2023-08-14 NOTE — PROGRESS NOTES
ARTHROSCOPY Right 1/19/2021    RIGHT SHOULDER ARTHROSCOPY, SUBACROMIAL DECOMPRESSION DISTAL CLAVICLE RESECTION ROTATOR CUFF REPAIR DEBRIDEMENT performed by Rakesh Bay DO at [facility]      vein stripping on right x2 / vein stripping on left x8       Medications:   Current Outpatient Medications:     hydroxychloroquine (PLAQUENIL) 200 MG tablet, Take 1 tablet by mouth 2 times daily, Disp: 60 tablet, Rfl: 0    methotrexate (RHEUMATREX) 2.5 MG chemo tablet, Take 8 tablets by mouth once a week, Disp: 32 tablet, Rfl: 1    folic acid (FOLVITE) 1 MG tablet, Take 1 tablet by mouth daily, Disp: 90 tablet, Rfl: 1    traZODone (DESYREL) 100 MG tablet, Take 1 tablet by mouth nightly at bedtime. , Disp: , Rfl:     empagliflozin (JARDIANCE) 10 MG tablet, Take 1 tablet by mouth daily, Disp: , Rfl:     potassium chloride (KLOR-CON M) 20 MEQ TBCR extended release tablet, Take 2 tablets by mouth daily, Disp: , Rfl:     diclofenac sodium (VOLTAREN) 1 % GEL, , Disp: , Rfl:     acyclovir (ZOVIRAX) 400 MG tablet, Take 1 tablet by mouth daily, Disp: , Rfl:     tiZANidine (ZANAFLEX) 4 MG tablet, Take 1 tablet by mouth nightly Take 1/2 tab nightly for 8 nights then increase to full tab., Disp: 30 tablet, Rfl: 3    Ubrogepant (UBRELVY) 100 MG TABS, Take 100 mg by mouth as needed (Migraine) May repeat after 2 hours if needed, Disp: 10 tablet, Rfl: 2    aspirin 81 MG EC tablet, Take 1 tablet by mouth daily, Disp: 30 tablet, Rfl: 3    Continuous Blood Gluc Sensor (DEXCOM G6 SENSOR) MISC, , Disp: , Rfl:     insulin aspart (NOVOLOG FLEXPEN) 100 UNIT/ML injection pen, COVER WITH LOW DOSE SLIDING SCALE APPROXIMATELY 20 TO 30 MINUTES AFTER START OF MEAL 3 TIMES DAILY -MAX OF 24 UNITS DAILY, Disp: , Rfl:     OZEMPIC, 0.25 OR 0.5 MG/DOSE, 2 MG/1.5ML SOPN, , Disp: , Rfl:     losartan-hydroCHLOROthiazide (HYZAAR) 100-25 MG per tablet, , Disp: , Rfl:     famotidine (PEPCID) 20 MG tablet, ,

## 2023-08-14 NOTE — FLOWSHEET NOTE
Outpatient Physical Therapy  [place]           [x] Phone: [phone]   Fax: [phone]  [facility]           [] Phone: [phone]   Fax: [phone]        Physical Therapy Daily Treatment Note  Date:  2023    Patient Name:  Kina Cook    :  1970  MRN: [id number]  Restrictions/Precautions: Restrictions/Precautions: General Precautions        Diagnosis:   Intervertebral disc disorders with radiculopathy, lumbar region [M51.16]  Spondylolisthesis, lumbar region [M43.16] Diagnosis: Lumbar disc herniation radiculopathy, spondylolisthesis L4-5  Date of Injury/Surgery:   Treatment Diagnosis:  low back pain, radiculopathy  Insurance/Certification information: [facility]  Referring Physician:  No ref. provider found Dr. Kimberly Ford   PCP: Marvin Waite MD  Next Doctor Visit:    Plan of care signed (Y/N):  n  Outcome Measure: Oswestry 46%  Visit# / total visits:  1 /  Pain level: 6/10   Goals:     Patient goals: decrease pain     Long Term Goals  Time Frame for Long Term Goals : 6 weeks  Long Term Goal 1: I in home program.  Long Term Goal 2: decrease LE symptoms by 75% or more. Long Term Goal 3: relate decreased catching/dragging toes by 50% or more. Long Term Goal 4: stand/walk 1 hr with minimal pain. Long Term Goal 5: sit one hour with minimal pain. Summary of Evaluation:  Assessment: Pt presents with complaint of chronic low back pain, with symptoms worsening and LLE in 2023. LE symptoms radiate to her foot constantly. She relates some bladder changes and noted her physician is aware. Recent imaging showed probable L4-5 disc bulge, herniation, degeneration, facet djd. She has neg SLR, pain and limited FADIR and SHANNA on L.  limted on R.  tenderness at LS junction, SIJ, Lumbar paraspinals. Pain limits standing, walking, sleeping, sitting, lifting. Mornings are tehe worst time of day.   She has weakness with L Df and ntoes she will catch/drag her toes, sensation is altered and

## 2023-08-22 ENCOUNTER — HOSPITAL ENCOUNTER (OUTPATIENT)
Dept: PHYSICAL THERAPY | Age: 53
Discharge: HOME | End: 2023-08-22

## 2023-08-31 ENCOUNTER — HOSPITAL ENCOUNTER (OUTPATIENT)
Age: 53
Discharge: HOME | End: 2023-08-31
Payer: COMMERCIAL

## 2023-08-31 DIAGNOSIS — M06.09 RHEUMATOID ARTHRITIS OF MULTIPLE SITES WITH NEGATIVE RHEUMATOID FACTOR (HCC): ICD-10-CM

## 2023-08-31 DIAGNOSIS — Z79.899 HIGH RISK MEDICATION USE: ICD-10-CM

## 2023-08-31 LAB
ALBUMIN SERPL-MCNC: 4.2 GM/DL (ref 3.4–5)
ALP BLD-CCNC: 96 IU/L (ref 40–128)
ALT SERPL-CCNC: 22 U/L (ref 10–40)
ANION GAP SERPL CALCULATED.3IONS-SCNC: 11 MMOL/L (ref 4–16)
AST SERPL-CCNC: 20 IU/L (ref 15–37)
BILIRUB SERPL-MCNC: 0.4 MG/DL (ref 0–1)
BUN SERPL-MCNC: 13 MG/DL (ref 6–23)
CALCIUM SERPL-MCNC: 8.9 MG/DL (ref 8.3–10.6)
CHLORIDE BLD-SCNC: 103 MMOL/L (ref 99–110)
CO2: 24 MMOL/L (ref 21–32)
CREAT SERPL-MCNC: 0.7 MG/DL (ref 0.6–1.1)
CRP SERPL HS-MCNC: 3 MG/L
GFR SERPL CREATININE-BSD FRML MDRD: >60 ML/MIN/1.73M2
GLUCOSE SERPL-MCNC: 204 MG/DL (ref 70–99)
HCT VFR BLD CALC: 41.6 % (ref 37–47)
HEMOGLOBIN: 13.3 GM/DL (ref 12.5–16)
MCH RBC QN AUTO: 28.5 PG (ref 27–31)
MCHC RBC AUTO-ENTMCNC: 32 % (ref 32–36)
MCV RBC AUTO: 89.3 FL (ref 78–100)
PDW BLD-RTO: 14.7 % (ref 11.7–14.9)
PLATELET # BLD: 303 K/CU MM (ref 140–440)
PMV BLD AUTO: 9.9 FL (ref 7.5–11.1)
POTASSIUM SERPL-SCNC: 4.2 MMOL/L (ref 3.5–5.1)
RBC # BLD: 4.66 M/CU MM (ref 4.2–5.4)
SODIUM BLD-SCNC: 138 MMOL/L (ref 135–145)
TOTAL PROTEIN: 6.3 GM/DL (ref 6.4–8.2)
WBC # BLD: 8 K/CU MM (ref 4–10.5)

## 2023-08-31 PROCEDURE — 85027 COMPLETE CBC AUTOMATED: CPT

## 2023-08-31 PROCEDURE — 36415 COLL VENOUS BLD VENIPUNCTURE: CPT

## 2023-08-31 PROCEDURE — 80053 COMPREHEN METABOLIC PANEL: CPT

## 2023-08-31 PROCEDURE — 86140 C-REACTIVE PROTEIN: CPT

## 2023-09-05 ENCOUNTER — HOSPITAL ENCOUNTER (OUTPATIENT)
Dept: PHYSICAL THERAPY | Age: 53
Setting detail: THERAPIES SERIES
Discharge: HOME | End: 2023-09-05
Payer: COMMERCIAL

## 2023-09-05 PROCEDURE — 97140 MANUAL THERAPY 1/> REGIONS: CPT

## 2023-09-05 PROCEDURE — 97110 THERAPEUTIC EXERCISES: CPT

## 2023-09-05 NOTE — FLOWSHEET NOTE
Outpatient Physical Therapy  [place]           [x] Phone: [phone]   Fax: [phone]  [facility]           [] Phone: [phone]   Fax: [phone]        Physical Therapy Daily Treatment Note  Date:  2023    Patient Name:  Dulce Srivastava    :  1970  MRN: [id number]  Restrictions/Precautions: Restrictions/Precautions: General Precautions   Diagnosis:   Intervertebral disc disorders with radiculopathy, lumbar region [M51.16]  Spondylolisthesis, lumbar region [M43.16] Diagnosis: Lumbar disc herniation radiculopathy, spondylolisthesis L4-5  Date of Injury/Surgery:   Treatment Diagnosis:  low back pain, radiculopathy  Insurance/Certification information: [facility]  Referring Physician:  No ref. provider found Dr. Krystian Holliday   PCP: Danii Eaton MD  Next Doctor Visit:    Plan of care signed (Y/N):  n  Outcome Measure: Oswestry 46%  Visit# / total visits:  2  Pain level:     10 /10       Goals:     Patient goals: decrease pain  Long Term Goals  Time Frame for Long Term Goals : 6 weeks  Long Term Goal 1: I in home program.  Long Term Goal 2: decrease LE symptoms by 75% or more. Long Term Goal 3: relate decreased catching/dragging toes by 50% or more. Long Term Goal 4: stand/walk 1 hr with minimal pain. Long Term Goal 5: sit one hour with minimal pain. Summary of Evaluation:  Assessment: Pt presents with complaint of chronic low back pain, with symptoms worsening and LLE in 2023. LE symptoms radiate to her foot constantly. She relates some bladder changes and noted her physician is aware. Recent imaging showed probable L4-5 disc bulge, herniation, degeneration, facet djd. She has neg SLR, pain and limited FADIR and SHANNA on L.  limted on R.  tenderness at LS junction, SIJ, Lumbar paraspinals. Pain limits standing, walking, sleeping, sitting, lifting. Mornings are tehe worst time of day.   She has weakness with L Df and ntoes she will catch/drag her toes, sensation is altered

## 2023-09-08 ENCOUNTER — OFFICE VISIT (OUTPATIENT)
Dept: RHEUMATOLOGY | Age: 53
End: 2023-09-08
Payer: COMMERCIAL

## 2023-09-08 VITALS
OXYGEN SATURATION: 98 % | BODY MASS INDEX: 37.42 KG/M2 | SYSTOLIC BLOOD PRESSURE: 120 MMHG | DIASTOLIC BLOOD PRESSURE: 80 MMHG | WEIGHT: 218 LBS | HEART RATE: 77 BPM

## 2023-09-08 DIAGNOSIS — M06.09 RHEUMATOID ARTHRITIS OF MULTIPLE SITES WITH NEGATIVE RHEUMATOID FACTOR (HCC): Primary | ICD-10-CM

## 2023-09-08 DIAGNOSIS — M05.79 RHEUMATOID ARTHRITIS INVOLVING MULTIPLE SITES WITH POSITIVE RHEUMATOID FACTOR (HCC): ICD-10-CM

## 2023-09-08 DIAGNOSIS — Z79.899 HIGH RISK MEDICATION USE: ICD-10-CM

## 2023-09-08 PROCEDURE — 3017F COLORECTAL CA SCREEN DOC REV: CPT | Performed by: STUDENT IN AN ORGANIZED HEALTH CARE EDUCATION/TRAINING PROGRAM

## 2023-09-08 PROCEDURE — G8427 DOCREV CUR MEDS BY ELIG CLIN: HCPCS | Performed by: STUDENT IN AN ORGANIZED HEALTH CARE EDUCATION/TRAINING PROGRAM

## 2023-09-08 PROCEDURE — 99214 OFFICE O/P EST MOD 30 MIN: CPT | Performed by: STUDENT IN AN ORGANIZED HEALTH CARE EDUCATION/TRAINING PROGRAM

## 2023-09-08 PROCEDURE — 1036F TOBACCO NON-USER: CPT | Performed by: STUDENT IN AN ORGANIZED HEALTH CARE EDUCATION/TRAINING PROGRAM

## 2023-09-08 PROCEDURE — G8417 CALC BMI ABV UP PARAM F/U: HCPCS | Performed by: STUDENT IN AN ORGANIZED HEALTH CARE EDUCATION/TRAINING PROGRAM

## 2023-09-08 RX ORDER — HYDROXYCHLOROQUINE SULFATE 200 MG/1
200 TABLET, FILM COATED ORAL 2 TIMES DAILY
Qty: 180 TABLET | Refills: 0 | Status: SHIPPED | OUTPATIENT
Start: 2023-09-08

## 2023-09-08 NOTE — PROGRESS NOTES
the most common possible side effects to include nausea and diarrhea, which often improve with time or by taking the medication with food. Less common side effects include skin rashes, changes in skin pigment (such as darkening or dark spots) or hair changes (bleaching or thinning of hair), and weakness. Rarely, hydroxychloroquine can lead to anemia in some individuals. Lastly, we discussed the rare risk of visual loss which is greatest risk in those taking higher doses for long periods. The patient was advised to have regular annual eye examinations by an ophthalmologist to evaluate for possible hydroxychloroquine toxicity of the eye. The risk of hydroxychloroquine retinal toxicity is dependent on daily dose and duration of use. At recommended doses (up to 5 mg/kg/day), the risk of toxicity up to 5 years is under 1% and up to 10 years is under 2%, but rises to almost 20% after 20 years. Fibromyalgia  WPI 17 SS 6    -Discussed about the diagnosis of fibromyalgia/ amplified pain: educated the patient about the condition. Explained that management needs a multidisciplinary approach  - Reinforced about importance of lifestyle modification with special emphasis on maintaining a health weight, diet modification and daily, graded, aerobic exercise, yoga, Hosea chi. Also discussed about role of prescription medications, as complementary to the above mentioned. Also, the importance of treatment of underlying mood disorders and sleep disorders. Patient Instructions  Continue Plaquenil 1 tab twice daily  Continue methotrexate to 8 pills every 7 days  Take folic acid 1 mg daily  Get labs on 12/1 prior to next visit   RTC in 3 months       -  The patient indicates understanding of these issues and agrees with the plan.     I spent 30 minutes on the date of service, preparing to see the patient (eg, review of tests), obtaining and/or reviewing separately obtained history, counseling and educating the family/caregiver,

## 2023-09-08 NOTE — PATIENT INSTRUCTIONS
Continue Plaquenil 1 tab twice daily  Increase methotrexate to 8 pills every 7 days  Take folic acid 1 mg daily  Get labs on 12/1, prior to next visit   RTC in 3 months

## 2023-09-12 ENCOUNTER — HOSPITAL ENCOUNTER (OUTPATIENT)
Dept: PHYSICAL THERAPY | Age: 53
Setting detail: THERAPIES SERIES
Discharge: HOME | End: 2023-09-12
Payer: COMMERCIAL

## 2023-09-12 PROCEDURE — 97140 MANUAL THERAPY 1/> REGIONS: CPT

## 2023-09-12 PROCEDURE — 97110 THERAPEUTIC EXERCISES: CPT

## 2023-09-12 NOTE — FLOWSHEET NOTE
Outpatient Physical Therapy  [place]           [x] Phone: [phone]   Fax: [phone]  [place] [facility]           [] Phone: [phone]   Fax: [phone]        Physical Therapy Daily Treatment Note  Date:  2023    Patient Name:  Dulce Srivastava    :  1970  MRN: [id number]  Restrictions/Precautions: Restrictions/Precautions: General Precautions   Diagnosis:   Intervertebral disc disorders with radiculopathy, lumbar region [M51.16]  Spondylolisthesis, lumbar region [M43.16] Diagnosis: Lumbar disc herniation radiculopathy, spondylolisthesis L4-5  Date of Injury/Surgery:   Treatment Diagnosis:  low back pain, radiculopathy  Insurance/Certification information: [facility]  Referring Physician:  No ref. provider found Dr. Krystian Holliday   PCP: Danii Eaton MD  Next Doctor Visit:    Plan of care signed (Y/N):  n  Outcome Measure: Oswestry 46%  Visit# / total visits:  2  Pain level:     4 /10       Goals:     Patient goals: decrease pain  Long Term Goals  Time Frame for Long Term Goals : 6 weeks  Long Term Goal 1: I in home program.  Long Term Goal 2: decrease LE symptoms by 75% or more. Long Term Goal 3: relate decreased catching/dragging toes by 50% or more. Long Term Goal 4: stand/walk 1 hr with minimal pain. Long Term Goal 5: sit one hour with minimal pain. Summary of Evaluation:  Assessment: Pt presents with complaint of chronic low back pain, with symptoms worsening and LLE in 2023. LE symptoms radiate to her foot constantly. She relates some bladder changes and noted her physician is aware. Recent imaging showed probable L4-5 disc bulge, herniation, degeneration, facet djd. She has neg SLR, pain and limited FADIR and SHANNA on L.  limted on R.  tenderness at LS junction, SIJ, Lumbar paraspinals. Pain limits standing, walking, sleeping, sitting, lifting. Mornings are tehe worst time of day.   She has weakness with L Df and ntoes she will catch/drag her toes, sensation is altered

## 2023-09-18 DIAGNOSIS — M06.09 RHEUMATOID ARTHRITIS OF MULTIPLE SITES WITH NEGATIVE RHEUMATOID FACTOR (HCC): ICD-10-CM

## 2023-09-19 ENCOUNTER — HOSPITAL ENCOUNTER (OUTPATIENT)
Dept: PHYSICAL THERAPY | Age: 53
Setting detail: THERAPIES SERIES
Discharge: HOME | End: 2023-09-19
Payer: COMMERCIAL

## 2023-09-19 DIAGNOSIS — M06.09 RHEUMATOID ARTHRITIS OF MULTIPLE SITES WITH NEGATIVE RHEUMATOID FACTOR (HCC): ICD-10-CM

## 2023-09-19 PROCEDURE — 97140 MANUAL THERAPY 1/> REGIONS: CPT

## 2023-09-19 PROCEDURE — 97110 THERAPEUTIC EXERCISES: CPT

## 2023-09-19 RX ORDER — FOLIC ACID 1 MG/1
1 TABLET ORAL DAILY
Qty: 90 TABLET | Refills: 0 | Status: SHIPPED | OUTPATIENT
Start: 2023-09-19

## 2023-09-19 RX ORDER — FOLIC ACID 1 MG/1
1 TABLET ORAL DAILY
Qty: 90 TABLET | Refills: 0 | OUTPATIENT
Start: 2023-09-19

## 2023-09-19 NOTE — FLOWSHEET NOTE
Outpatient Physical Therapy  [place]           [x] Phone: [phone]   Fax: [phone]  [place]           [] Phone: [phone]   Fax: [phone]        Physical Therapy Daily Treatment Note  Date:  2023    Patient Name:  Anjelica Lazar    :  1970  MRN: [id number]  Restrictions/Precautions: Restrictions/Precautions: General Precautions   Diagnosis:   Intervertebral disc disorders with radiculopathy, lumbar region [M51.16]  Spondylolisthesis, lumbar region [M43.16] Diagnosis: Lumbar disc herniation radiculopathy, spondylolisthesis L4-5  Date of Injury/Surgery:   Treatment Diagnosis:  low back pain, radiculopathy  Insurance/Certification information: [facility]  Referring Physician:  No ref. provider found Dr. Jethro Casey   PCP: Rubén Brito MD  Next Doctor Visit:    Plan of care signed (Y/N):  n  Outcome Measure: Oswestry 46%  Visit# / total visits:  3  Pain level:    3/10       Goals:     Patient goals: decrease pain  Long Term Goals  Time Frame for Long Term Goals : 6 weeks  Long Term Goal 1: I in home program.  Long Term Goal 2: decrease LE symptoms by 75% or more. Long Term Goal 3: relate decreased catching/dragging toes by 50% or more. Long Term Goal 4: stand/walk 1 hr with minimal pain. Long Term Goal 5: sit one hour with minimal pain. Summary of Evaluation:  Assessment: Pt presents with complaint of chronic low back pain, with symptoms worsening and LLE in 2023. LE symptoms radiate to her foot constantly. She relates some bladder changes and noted her physician is aware. Recent imaging showed probable L4-5 disc bulge, herniation, degeneration, facet djd. She has neg SLR, pain and limited FADIR and SHANNA on L.  limted on R.  tenderness at LS junction, SIJ, Lumbar paraspinals. Pain limits standing, walking, sleeping, sitting, lifting. Mornings are tehe worst time of day.   She has weakness with L Df and ntoes she will catch/drag her toes, sensation is altered and

## 2023-09-26 ENCOUNTER — HOSPITAL ENCOUNTER (OUTPATIENT)
Dept: PHYSICAL THERAPY | Age: 53
Setting detail: THERAPIES SERIES
Discharge: HOME | End: 2023-09-26
Payer: COMMERCIAL

## 2023-09-26 PROCEDURE — 97110 THERAPEUTIC EXERCISES: CPT

## 2023-09-26 PROCEDURE — 97140 MANUAL THERAPY 1/> REGIONS: CPT

## 2023-09-26 NOTE — FLOWSHEET NOTE
diminished at lateral leg and foot. Subjective:      Pt relates her foot is still numb. Saw Siddharth Machado neurology and has some med changes. Still draging foot and stumbles, but no falls. Burning pain in low back. Any changes in Ambulatory Summary Sheet? [facility] neuro changed some meds. Objective:      Tenderness at L >R lumbar, LS paraspinals, superior gluteals, PSIS. Exercises: (No more than 4 columns)   Exercise/Equipment Date 8/14/23 Date 9/5/23 #2 Date 9/12/23 #3 #4 9/19/23 #5 9/26/23              WARM UP                           TABLE        TA contraction W exhale assist.   review  review   Gluteal stretch Knee to opp shld 30 sec x 3 Manually gluteal and piriformis stretch Manually gluteal and piriformis stretch Manually gluteal and piriformis stretch Manually gluteal and piriformis stretch   clamshells        bridge   X 10 x15       Prone lying   X 5 mins X 5mins    STANDING        Hip 4 way w band   2X 10 no resistance     Ankle pf/df   Difficulty w DF 3x10 DF MRE 3x10. ER no resist 2x10 DF MRE 3x10. ER no resist 2x10   SLS   Difficult w recent ankle injury     Rows   Blue 3x10 4 x 10    antirotation        Shld extension   Blue 3x10       Side stepping       25ft x 2 each way    PROPRIOCEPTION        SB progression. MODALITIES        Traction trial                    Other Therapeutic Activities/Education:        Home Exercise Program:    TA contraction  Knee to opp shld stretch  Hip 4 way, heel/toe raise. Manual Treatments:    Leg pulls, TPR to hip flexors. LLE sciatic nerve glide. TPR to Lumbar L paraspinals,    Modalities:        Communication with other providers:        Assessment:  (Response towards treatment session) (Pain Rating)    Pain rated 3/10. Radicular symptoms persist, decreased L foot symptoms  and burning back pain following manual to low back.   Pt would benefit from additional therapy to improve her symptoms and

## 2023-10-04 ENCOUNTER — HOSPITAL ENCOUNTER (OUTPATIENT)
Dept: PHYSICAL THERAPY | Age: 53
Setting detail: THERAPIES SERIES
Discharge: HOME | End: 2023-10-04
Payer: COMMERCIAL

## 2023-10-04 PROCEDURE — 97110 THERAPEUTIC EXERCISES: CPT

## 2023-10-04 NOTE — FLOWSHEET NOTE
Outpatient Physical Therapy  [place]           [x] Phone: [phone]   Fax: [phone]  [facility]           [] Phone: [phone]   Fax: [phone]        Physical Therapy Daily Treatment Note  Date:  10/4/2023    Patient Name:  Meghan Cunningham    :  1970  MRN: [id number]  Restrictions/Precautions: Restrictions/Precautions: General Precautions   Diagnosis:   Intervertebral disc disorders with radiculopathy, lumbar region [M51.16]  Spondylolisthesis, lumbar region [M43.16] Diagnosis: Lumbar disc herniation radiculopathy, spondylolisthesis L4-5  Date of Injury/Surgery:   Treatment Diagnosis:  low back pain, radiculopathy  Insurance/Certification information: [facility]  Referring Physician:  No ref. provider found Dr. Tyson Elam   PCP: Ava Humphries MD  Next Doctor Visit:    Plan of care signed (Y/N):  n  Outcome Measure: Oswestry 46%  Visit# / total visits:  6  Pain level:    4/10       Goals:     Patient goals: decrease pain  Long Term Goals  Time Frame for Long Term Goals : 6 weeks  Long Term Goal 1: I in home program.  Long Term Goal 2: decrease LE symptoms by 75% or more. Long Term Goal 3: relate decreased catching/dragging toes by 50% or more. Long Term Goal 4: stand/walk 1 hr with minimal pain. Long Term Goal 5: sit one hour with minimal pain. Summary of Evaluation:  Assessment: Pt presents with complaint of chronic low back pain, with symptoms worsening and LLE in 2023. LE symptoms radiate to her foot constantly. She relates some bladder changes and noted her physician is aware. Recent imaging showed probable L4-5 disc bulge, herniation, degeneration, facet djd. She has neg SLR, pain and limited FADIR and SHANNA on L.  limted on R.  tenderness at LS junction, SIJ, Lumbar paraspinals. Pain limits standing, walking, sleeping, sitting, lifting. Mornings are tehe worst time of day.   She has weakness with L Df and ntoes she will catch/drag her toes, sensation is altered and

## 2023-10-10 ENCOUNTER — HOSPITAL ENCOUNTER (OUTPATIENT)
Dept: PHYSICAL THERAPY | Age: 53
Setting detail: THERAPIES SERIES
Discharge: HOME | End: 2023-10-10
Payer: COMMERCIAL

## 2023-10-10 PROCEDURE — 97140 MANUAL THERAPY 1/> REGIONS: CPT

## 2023-10-10 PROCEDURE — 97110 THERAPEUTIC EXERCISES: CPT

## 2023-10-10 NOTE — FLOWSHEET NOTE
Outpatient Physical Therapy  [place]           [x] Phone: [phone]   Fax: [phone]  [place] [facility]           [] Phone: [phone]   Fax: [phone]        Physical Therapy Daily Treatment Note  Date:  10/10/2023    Patient Name:  Justin Hastings    :  1970  MRN: [id number]  Restrictions/Precautions: Restrictions/Precautions: General Precautions   Diagnosis:   Intervertebral disc disorders with radiculopathy, lumbar region [M51.16]  Spondylolisthesis, lumbar region [M43.16] Diagnosis: Lumbar disc herniation radiculopathy, spondylolisthesis L4-5  Date of Injury/Surgery:   Treatment Diagnosis:  low back pain, radiculopathy  Insurance/Certification information: [facility]  Referring Physician:  No ref. provider found Dr. Costa Ann   PCP: Raji Lowry MD  Next Doctor Visit:    Plan of care signed (Y/N):  n  Outcome Measure: Oswestry 46%  Visit# / total visits:  7  Pain level:    4/10       Goals:     Patient goals: decrease pain  Long Term Goals  Time Frame for Long Term Goals : 6 weeks  Long Term Goal 1: I in home program.  Long Term Goal 2: decrease LE symptoms by 75% or more. Long Term Goal 3: relate decreased catching/dragging toes by 50% or more. Long Term Goal 4: stand/walk 1 hr with minimal pain. Long Term Goal 5: sit one hour with minimal pain. Summary of Evaluation:  Assessment: Pt presents with complaint of chronic low back pain, with symptoms worsening and LLE in 2023. LE symptoms radiate to her foot constantly. She relates some bladder changes and noted her physician is aware. Recent imaging showed probable L4-5 disc bulge, herniation, degeneration, facet djd. She has neg SLR, pain and limited FADIR and SHANNA on L.  limted on R.  tenderness at LS junction, SIJ, Lumbar paraspinals. Pain limits standing, walking, sleeping, sitting, lifting. Mornings are tehe worst time of day.   She has weakness with L Df and ntoes she will catch/drag her toes, sensation is altered and

## 2023-10-17 ENCOUNTER — HOSPITAL ENCOUNTER (OUTPATIENT)
Dept: PHYSICAL THERAPY | Age: 53
Setting detail: THERAPIES SERIES
Discharge: HOME | End: 2023-10-17
Payer: COMMERCIAL

## 2023-10-17 PROCEDURE — 97110 THERAPEUTIC EXERCISES: CPT

## 2023-10-17 PROCEDURE — 97140 MANUAL THERAPY 1/> REGIONS: CPT

## 2023-10-17 NOTE — FLOWSHEET NOTE
Outpatient Physical Therapy  [place]           [x] Phone: [phone]   Fax: [phone]  [facility]           [] Phone: [phone]   Fax: [phone]        Physical Therapy Daily Treatment Note  Date:  10/17/2023    Patient Name:  Rosemary Julio    :  1970  MRN: [id number]  Restrictions/Precautions: Restrictions/Precautions: General Precautions   Diagnosis:   Intervertebral disc disorders with radiculopathy, lumbar region [M51.16]  Spondylolisthesis, lumbar region [M43.16] Diagnosis: Lumbar disc herniation radiculopathy, spondylolisthesis L4-5  Date of Injury/Surgery:   Treatment Diagnosis:  low back pain, radiculopathy  Insurance/Certification information: [facility]  Referring Physician:  No ref. provider found Dr. Renzo Giang   PCP: Carolyn Collins MD  Next Doctor Visit:    Plan of care signed (Y/N):  n  Outcome Measure: Oswestry 46%  Visit# / total visits:  8  Pain level:    8/10       Goals:     Patient goals: decrease pain  Long Term Goals  Time Frame for Long Term Goals : 6 weeks  Long Term Goal 1: I in home program.  Long Term Goal 2: decrease LE symptoms by 75% or more. Long Term Goal 3: relate decreased catching/dragging toes by 50% or more. Long Term Goal 4: stand/walk 1 hr with minimal pain. Long Term Goal 5: sit one hour with minimal pain. Summary of Evaluation:  Assessment: Pt presents with complaint of chronic low back pain, with symptoms worsening and LLE in 2023. LE symptoms radiate to her foot constantly. She relates some bladder changes and noted her physician is aware. Recent imaging showed probable L4-5 disc bulge, herniation, degeneration, facet djd. She has neg SLR, pain and limited FADIR and SHANNA on L.  limted on R.  tenderness at LS junction, SIJ, Lumbar paraspinals. Pain limits standing, walking, sleeping, sitting, lifting. Mornings are tehe worst time of day.   She has weakness with L Df and ntoes she will catch/drag her toes, sensation is altered and

## 2023-10-24 ENCOUNTER — HOSPITAL ENCOUNTER (OUTPATIENT)
Dept: PHYSICAL THERAPY | Age: 53
Setting detail: THERAPIES SERIES
Discharge: HOME | End: 2023-10-24
Payer: COMMERCIAL

## 2023-10-24 PROCEDURE — 97110 THERAPEUTIC EXERCISES: CPT

## 2023-10-24 PROCEDURE — 97140 MANUAL THERAPY 1/> REGIONS: CPT

## 2023-10-24 NOTE — PROGRESS NOTES
Outpatient Physical Therapy        [x] Phone: [phone]   Fax: [phone]  Physician:   Dr. Becky Umana             From: Rin Breen PT,     Patient: Lisa Prater                    : 1970   Diagnosis: Lumbar disc herniation radiculopathy, spondylolisthesis L4-5      Treatment Diagnosis: low back pain, radiculopathy      Date: 10/24/2023    [x]  Progress Note                []  Discharge Note    Total Visits to date:    9   Cancels/No-shows to date:   1    Subjective:    Overall notes some improvement, today is a bad day though. 8/10 low back pain. Relates decreased LLE symptoms overall, but still has some mild tingling at lateral leg and constant tight/cramp feeling at arch of L foot. Typically no RLE, but having some intermittent shooting pains today to her thigh. Prominent Objective Findings:    Long Term Goal 1: I in home program.                                                                                met for current program.    Long Term Goal 2: decrease LE symptoms by 75% or more. 50% after treatment, then worsens  Long Term Goal 3: relate decreased catching/dragging toes by 50% or more. Not improved  Long Term Goal 4: stand/walk 1 hr with minimal pain. Painful but able  Long Term Goal 5: sit one hour with minimal pain. Relates 10 mins               DF strength has improved to 4+/5. Pain with all trunk AROM in standing full flexion, 10 ext, L LB pain with RSB. Some relief/decreased LLE symptoms with leg pulls/traction.   Tenderness at LS junction, paraspinals, gluteals, hip flexors    Oswestry 46%    Goal Status:  [] Achieved [x] Partially Achieved  [] Not Achieved         Changes to goals:    Planned Services:  [x] Therapeutic Exercise    [] Modalities:  [x] Therapeutic Activity     []

## 2023-10-24 NOTE — FLOWSHEET NOTE
worst time of day. She has weakness with L Df and ntoes she will catch/drag her toes, sensation is altered and diminished at lateral leg and foot. Subjective:  Overall improvements. Still having cramping tight feeling at foot. Slight numbness at lateral leg. Having intermittent sharp pains in back today. Shoulder pains with band ex's last time. Any changes in Ambulatory Summary Sheet?   no        Objective:      No numbness or pain in LE, but has a cramping feeling in arch of foot. L DF 4+    Exercises: (No more than 4 columns)   Exercise/Equipment #5 9/26/23  #6 10/4/23 #7 10/10/23 #8 10/17/23 #9 10/24/23             WARM UP                           TABLE        TA contraction review       Gluteal stretch Manually gluteal and piriformis stretch Manually gluteal and piriformis stretch Manually gluteal and piriformis stretch Manually gluteal and piriformis stretch Manually gluteal and piriformis stretch   clamshells        bridge   2x12  2x12      Prone lying     Increased shld pain   LTR   10 ct x 3     Seated trunk rotation    stretching stretching   STANDING        Hip 4 way w band        Ankle pf/df DF MRE 3x10. Ev no resist 2x10  DF MRE 3x10. Ev no resist 2x10     SLS        Rows        antirotation    2 blue strand 3x10    Shld extension          Side stepping           Standing trunk ext  Centralized symptoms  3x10   No change in distal symptoms 2x15    PROPRIOCEPTION        SB progression. Ccw/cw circles, ant/post tilts. Seated bounce 2 x 30sec each Narrow base hold 30 sec x 3       Seated march 3x10  2x10        Sit backs 3x10 3x10        Rows:  blue 3x10 w TA      Rocker board    Balance and rock 30sec x 3 f/b and s/s                    MODALITIES        Traction trial                    Other Therapeutic Activities/Education:        Home Exercise Program:    TA contraction  Knee to opp shld stretch  Hip 4 way, heel/toe raise.    Standing ext  Bridging    Manual Treatments: leg

## 2023-10-31 ENCOUNTER — HOSPITAL ENCOUNTER (OUTPATIENT)
Dept: PHYSICAL THERAPY | Age: 53
Setting detail: THERAPIES SERIES
Discharge: HOME | End: 2023-10-31
Payer: COMMERCIAL

## 2023-10-31 PROCEDURE — 97140 MANUAL THERAPY 1/> REGIONS: CPT

## 2023-10-31 PROCEDURE — 97110 THERAPEUTIC EXERCISES: CPT

## 2023-10-31 NOTE — FLOWSHEET NOTE
pain with transfer. Only pain is at low back and a little in the hip. Pt would benefit from additional therapy to improve her symptoms and strength for functional activities.           Plan for Next Session:        Time In / Time Out:   5505/5155  If [facility] Please Indicate Units for Rx this date and running total for each and overall total for Rx to date:  CPT Code Units today Running Total Units Total approved    TE [id number]  1 15    MAN 61824  1 12    Gait [address]       [address] [address]       [place] [phone]      VASO 75190       ADL/Self care 21736      DNT 1-2 75912      DNT 3-4 20561      Other:     1           Total for episode of care   28 60 units by 11/11/23          Timed Code/Total Treatment Minutes:  34/34      Next Progress Note due:        Plan of Care Interventions:  [x] Therapeutic Exercise  [] Modalities:  [x] Therapeutic Activity     [] Ultrasound  [] Estim  [] Gait Training      [] Cervical Traction [] Lumbar Traction  [x] Neuromuscular Re-education    [] Cold/hotpack [] Iontophoresis   [x] Instruction in HEP      [] Vasopneumatic   [] Dry Needling    [x] Manual Therapy               [] Aquatic Therapy              Electronically signed by:  Ya Blancas PT, 10/31/2023, 4:09 PM

## 2023-11-07 ENCOUNTER — HOSPITAL ENCOUNTER (OUTPATIENT)
Dept: PHYSICAL THERAPY | Age: 53
Setting detail: THERAPIES SERIES
Discharge: HOME | End: 2023-11-07
Payer: COMMERCIAL

## 2023-11-07 PROCEDURE — 97140 MANUAL THERAPY 1/> REGIONS: CPT

## 2023-11-07 PROCEDURE — 97110 THERAPEUTIC EXERCISES: CPT

## 2023-11-07 NOTE — FLOWSHEET NOTE
overall total for Rx to date:  CPT Code Units today Running Total Units Total approved    TE [id number]  1 06    [facility] [address]  2 14    Gait 91055      NR [address] [facility] [phone]      [facility] [address]       ADL/Self care 17617      DNT 1-2 20560      DNT 3-4 20561      Other:     1           Total for episode of care   31 60 units by 11/11/23          Timed Code/Total Treatment Minutes:  45/45      Next Progress Note due:        Plan of Care Interventions:  [x] Therapeutic Exercise  [] Modalities:  [x] Therapeutic Activity     [] Ultrasound  [] Estim  [] Gait Training      [] Cervical Traction [] Lumbar Traction  [x] Neuromuscular Re-education    [] Cold/hotpack [] Iontophoresis   [x] Instruction in HEP      [] Vasopneumatic   [] Dry Needling    [x] Manual Therapy               [] Aquatic Therapy              Electronically signed by:  Johnna Edwards PT, 11/7/2023, 7:58 AM

## 2023-11-17 ENCOUNTER — TELEPHONE (OUTPATIENT)
Dept: RHEUMATOLOGY | Age: 53
End: 2023-11-17

## 2023-11-17 NOTE — TELEPHONE ENCOUNTER
Pt called stating since she started the medication Plaquenil she has noticed bilateral eye itching, bilateral blurriness, increased body aches, and an increase in migraines. Pt states her last dose was yesterday.  Please advise

## 2023-12-01 DIAGNOSIS — M06.09 RHEUMATOID ARTHRITIS OF MULTIPLE SITES WITH NEGATIVE RHEUMATOID FACTOR (HCC): ICD-10-CM

## 2023-12-04 ENCOUNTER — HOSPITAL ENCOUNTER (OUTPATIENT)
Age: 53
Discharge: HOME | End: 2023-12-04
Payer: COMMERCIAL

## 2023-12-04 DIAGNOSIS — Z79.899 HIGH RISK MEDICATION USE: ICD-10-CM

## 2023-12-04 LAB
ALBUMIN SERPL-MCNC: 4.4 GM/DL (ref 3.4–5)
ALP BLD-CCNC: 95 IU/L (ref 40–129)
ALT SERPL-CCNC: 18 U/L (ref 10–40)
ANION GAP SERPL CALCULATED.3IONS-SCNC: 12 MMOL/L (ref 4–16)
AST SERPL-CCNC: 15 IU/L (ref 15–37)
BILIRUB SERPL-MCNC: 0.5 MG/DL (ref 0–1)
BUN SERPL-MCNC: 8 MG/DL (ref 6–23)
CALCIUM SERPL-MCNC: 9.3 MG/DL (ref 8.3–10.6)
CHLORIDE BLD-SCNC: 100 MMOL/L (ref 99–110)
CO2: 26 MMOL/L (ref 21–32)
CREAT SERPL-MCNC: 0.7 MG/DL (ref 0.6–1.1)
GFR SERPL CREATININE-BSD FRML MDRD: >60 ML/MIN/1.73M2
GLUCOSE SERPL-MCNC: 194 MG/DL (ref 70–99)
HCT VFR BLD CALC: 45.8 % (ref 37–47)
HEMOGLOBIN: 14.5 GM/DL (ref 12.5–16)
MCH RBC QN AUTO: 28.4 PG (ref 27–31)
MCHC RBC AUTO-ENTMCNC: 31.7 % (ref 32–36)
MCV RBC AUTO: 89.6 FL (ref 78–100)
PDW BLD-RTO: 13.9 % (ref 11.7–14.9)
PLATELET # BLD: 334 K/CU MM (ref 140–440)
PMV BLD AUTO: 10.4 FL (ref 7.5–11.1)
POTASSIUM SERPL-SCNC: 4.2 MMOL/L (ref 3.5–5.1)
RBC # BLD: 5.11 M/CU MM (ref 4.2–5.4)
SODIUM BLD-SCNC: 138 MMOL/L (ref 135–145)
TOTAL PROTEIN: 7.3 GM/DL (ref 6.4–8.2)
WBC # BLD: 8.9 K/CU MM (ref 4–10.5)

## 2023-12-04 PROCEDURE — 85027 COMPLETE CBC AUTOMATED: CPT

## 2023-12-04 PROCEDURE — 80053 COMPREHEN METABOLIC PANEL: CPT

## 2023-12-04 PROCEDURE — 36415 COLL VENOUS BLD VENIPUNCTURE: CPT

## 2023-12-04 RX ORDER — PREDNISONE 5 MG/1
TABLET ORAL
Qty: 84 TABLET | Refills: 0 | OUTPATIENT
Start: 2023-12-04
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
## 2023-12-08 ENCOUNTER — OFFICE VISIT (OUTPATIENT)
Dept: RHEUMATOLOGY | Age: 53
End: 2023-12-08
Payer: COMMERCIAL

## 2023-12-08 ENCOUNTER — TELEPHONE (OUTPATIENT)
Dept: RHEUMATOLOGY | Age: 53
End: 2023-12-08

## 2023-12-08 VITALS
OXYGEN SATURATION: 100 % | HEART RATE: 77 BPM | DIASTOLIC BLOOD PRESSURE: 60 MMHG | WEIGHT: 203 LBS | SYSTOLIC BLOOD PRESSURE: 110 MMHG | BODY MASS INDEX: 34.84 KG/M2

## 2023-12-08 DIAGNOSIS — Z79.899 HIGH RISK MEDICATION USE: ICD-10-CM

## 2023-12-08 DIAGNOSIS — M06.09 RHEUMATOID ARTHRITIS OF MULTIPLE SITES WITH NEGATIVE RHEUMATOID FACTOR (HCC): Primary | ICD-10-CM

## 2023-12-08 DIAGNOSIS — M79.7 FIBROMYALGIA: ICD-10-CM

## 2023-12-08 PROCEDURE — 99215 OFFICE O/P EST HI 40 MIN: CPT | Performed by: STUDENT IN AN ORGANIZED HEALTH CARE EDUCATION/TRAINING PROGRAM

## 2023-12-08 PROCEDURE — 3017F COLORECTAL CA SCREEN DOC REV: CPT | Performed by: STUDENT IN AN ORGANIZED HEALTH CARE EDUCATION/TRAINING PROGRAM

## 2023-12-08 PROCEDURE — G8427 DOCREV CUR MEDS BY ELIG CLIN: HCPCS | Performed by: STUDENT IN AN ORGANIZED HEALTH CARE EDUCATION/TRAINING PROGRAM

## 2023-12-08 PROCEDURE — G8484 FLU IMMUNIZE NO ADMIN: HCPCS | Performed by: STUDENT IN AN ORGANIZED HEALTH CARE EDUCATION/TRAINING PROGRAM

## 2023-12-08 PROCEDURE — 1036F TOBACCO NON-USER: CPT | Performed by: STUDENT IN AN ORGANIZED HEALTH CARE EDUCATION/TRAINING PROGRAM

## 2023-12-08 PROCEDURE — G8417 CALC BMI ABV UP PARAM F/U: HCPCS | Performed by: STUDENT IN AN ORGANIZED HEALTH CARE EDUCATION/TRAINING PROGRAM

## 2023-12-08 RX ORDER — METHOTREXATE 2.5 MG/1
20 TABLET ORAL WEEKLY
Qty: 32 TABLET | Refills: 2 | Status: SHIPPED | OUTPATIENT
Start: 2023-12-08

## 2023-12-08 RX ORDER — FOLIC ACID 1 MG/1
1 TABLET ORAL DAILY
Qty: 90 TABLET | Refills: 0 | Status: SHIPPED | OUTPATIENT
Start: 2023-12-08

## 2023-12-08 RX ORDER — LASMIDITAN 100 MG/1
TABLET ORAL
COMMUNITY
Start: 2023-11-12

## 2023-12-08 RX ORDER — ADALIMUMAB 40MG/0.4ML
40 KIT SUBCUTANEOUS
Qty: 2 EACH | Refills: 2 | Status: SHIPPED | OUTPATIENT
Start: 2023-12-08

## 2023-12-08 RX ORDER — FREMANEZUMAB-VFRM 225 MG/1.5ML
225 INJECTION SUBCUTANEOUS
COMMUNITY
Start: 2023-10-06

## 2023-12-08 NOTE — PATIENT INSTRUCTIONS
FOCAL LASER TODAY. Stop plaquenil   Continue methotrexate to 8 pills every 7 days  Continue folic acid 1 mg daily  Start Humira 40mg every 2 weeks   Get labs on 3/1 prior to next visit   RTC in 3 months

## 2023-12-08 NOTE — TELEPHONE ENCOUNTER
Dex Davies sent a message stating that the pt will require a TB test to be completed before they can submit the PA for the medication.

## 2023-12-26 ENCOUNTER — TELEPHONE (OUTPATIENT)
Dept: RHEUMATOLOGY | Age: 53
End: 2023-12-26

## 2024-03-04 ENCOUNTER — HOSPITAL ENCOUNTER (OUTPATIENT)
Age: 54
Discharge: HOME | End: 2024-03-04
Payer: COMMERCIAL

## 2024-03-04 DIAGNOSIS — Z79.899 HIGH RISK MEDICATION USE: ICD-10-CM

## 2024-03-04 LAB
ALBUMIN SERPL-MCNC: 4 GM/DL (ref 3.4–5)
ALP BLD-CCNC: 116 IU/L (ref 40–128)
ALT SERPL-CCNC: 13 U/L (ref 10–40)
ANION GAP SERPL CALCULATED.3IONS-SCNC: 11 MMOL/L (ref 7–16)
AST SERPL-CCNC: 12 IU/L (ref 15–37)
BILIRUB SERPL-MCNC: 0.3 MG/DL (ref 0–1)
BUN SERPL-MCNC: 12 MG/DL (ref 6–23)
CALCIUM SERPL-MCNC: 9.1 MG/DL (ref 8.3–10.6)
CHLORIDE BLD-SCNC: 101 MMOL/L (ref 99–110)
CO2: 25 MMOL/L (ref 21–32)
CREAT SERPL-MCNC: 0.6 MG/DL (ref 0.6–1.1)
GFR SERPL CREATININE-BSD FRML MDRD: >60 ML/MIN/1.73M2
GLUCOSE SERPL-MCNC: 322 MG/DL (ref 70–99)
HCT VFR BLD CALC: 41.7 % (ref 37–47)
HEMOGLOBIN: 13.1 GM/DL (ref 12.5–16)
MCH RBC QN AUTO: 28.1 PG (ref 27–31)
MCHC RBC AUTO-ENTMCNC: 31.4 % (ref 32–36)
MCV RBC AUTO: 89.5 FL (ref 78–100)
PDW BLD-RTO: 14 % (ref 11.7–14.9)
PLATELET # BLD: 340 K/CU MM (ref 140–440)
PMV BLD AUTO: 10.1 FL (ref 7.5–11.1)
POTASSIUM SERPL-SCNC: 4.5 MMOL/L (ref 3.5–5.1)
RBC # BLD: 4.66 M/CU MM (ref 4.2–5.4)
SODIUM BLD-SCNC: 137 MMOL/L (ref 135–145)
TOTAL PROTEIN: 6.6 GM/DL (ref 6.4–8.2)
WBC # BLD: 8.2 K/CU MM (ref 4–10.5)

## 2024-03-04 PROCEDURE — 80053 COMPREHEN METABOLIC PANEL: CPT

## 2024-03-04 PROCEDURE — 36415 COLL VENOUS BLD VENIPUNCTURE: CPT

## 2024-03-04 PROCEDURE — 85027 COMPLETE CBC AUTOMATED: CPT

## 2024-03-06 NOTE — PROGRESS NOTES
inflammation  Neck: Trachea midline, no masses  Lymph: no LAD  Lungs: CTAB, no rales  Heart: regular rate and rhythm, S1, S2 normal, no murmur, no lower extremity edema  Abdomen: Soft, ND, NT. + BS.  Extremities: atraumatic, no cyanosis or edema.   Neurologic: CN 2-12 grossly intact.   Skin: No active rashes, warm and dry, no telangiectasias, no digital pitting, periungal scaling+ no sclerodactyly, no rheumatoid nodules, no livedo  MSK:   HANDS: left handed female. Bilateral CMC pain, Bilateral MCP and PIP tenderness, swelling+  Elbow: No synovitis, good ROM,   Shoulder:good ROM,   Knee: no effusion, good ROM,   Ankle:good ROM,   FEET: pos forefoot squeeze test    Spine:  Normal range of motion; no tender points, no obvious deformities.    Neuro:  Alert & oriented x 3, normal motor function, normal sensory function, no focal deficits noted .  Muscle strength: 4/4 in bilateral upper and lower extremities.    Psychiatric: Mood and affect are appropriate, recent and remote memory normal,    WPI:   Right upper : Jaw, shoulder girdle, arm. Lower arm +,   left Upper: Jaw, shoulder girdle, arm. Lower arm +  Right Lower: hip, buttock, thigh, lower leg+,   Left lower: hip, buttock, thigh, lower leg+  Axial: neck, upper back, lower back, chest, abdomen+  Total:  17 /21    SS:  Fatigue, 0,1,2,3  Waking  up non-refreshed 0, I ,2 ,3  Cognitive symptoms 0, 1, 2, 3    Symptoms in the last 6 months   Headache 1, lower abd pain 0, depression 0  Total: 6 /12    WPI 17 SS 6    Fibroscore  WPI >7, SS> 5  WPI 3-6, SS>9     MSK ULTRASOUND REPORT  Normal bony contours.  Increased doppler signal in tendons suggestive of tendonitis, though seen only in DL view  Dynamic exam PIP heads questionable for erosions.       LABS AND IMAGING    Available labs were reviewed and discussed with patient   1/2023  CCP neg   RF neg   Dsdna neg   SSA neg   Anti sm neg   CK neg    8/31/23  WBC 11.6 H>> 8.0 normal   Hgb wnl   ESR neg   CRP 9.2 >> 23.4 H >> 3

## 2024-03-11 ENCOUNTER — OFFICE VISIT (OUTPATIENT)
Dept: RHEUMATOLOGY | Age: 54
End: 2024-03-11
Payer: COMMERCIAL

## 2024-03-11 VITALS
DIASTOLIC BLOOD PRESSURE: 75 MMHG | BODY MASS INDEX: 34.84 KG/M2 | HEART RATE: 66 BPM | WEIGHT: 203 LBS | SYSTOLIC BLOOD PRESSURE: 125 MMHG

## 2024-03-11 DIAGNOSIS — M25.50 POLYARTHRALGIA: ICD-10-CM

## 2024-03-11 DIAGNOSIS — M06.09 RHEUMATOID ARTHRITIS OF MULTIPLE SITES WITH NEGATIVE RHEUMATOID FACTOR (HCC): Primary | ICD-10-CM

## 2024-03-11 DIAGNOSIS — M79.7 FIBROMYALGIA: ICD-10-CM

## 2024-03-11 DIAGNOSIS — Z79.899 HIGH RISK MEDICATION USE: ICD-10-CM

## 2024-03-11 PROCEDURE — G8417 CALC BMI ABV UP PARAM F/U: HCPCS | Performed by: STUDENT IN AN ORGANIZED HEALTH CARE EDUCATION/TRAINING PROGRAM

## 2024-03-11 PROCEDURE — 1036F TOBACCO NON-USER: CPT | Performed by: STUDENT IN AN ORGANIZED HEALTH CARE EDUCATION/TRAINING PROGRAM

## 2024-03-11 PROCEDURE — 3017F COLORECTAL CA SCREEN DOC REV: CPT | Performed by: STUDENT IN AN ORGANIZED HEALTH CARE EDUCATION/TRAINING PROGRAM

## 2024-03-11 PROCEDURE — G8484 FLU IMMUNIZE NO ADMIN: HCPCS | Performed by: STUDENT IN AN ORGANIZED HEALTH CARE EDUCATION/TRAINING PROGRAM

## 2024-03-11 PROCEDURE — G8427 DOCREV CUR MEDS BY ELIG CLIN: HCPCS | Performed by: STUDENT IN AN ORGANIZED HEALTH CARE EDUCATION/TRAINING PROGRAM

## 2024-03-11 PROCEDURE — 99215 OFFICE O/P EST HI 40 MIN: CPT | Performed by: STUDENT IN AN ORGANIZED HEALTH CARE EDUCATION/TRAINING PROGRAM

## 2024-03-11 RX ORDER — GABAPENTIN 100 MG/1
200 CAPSULE ORAL NIGHTLY
Qty: 60 CAPSULE | Refills: 0 | Status: SHIPPED | OUTPATIENT
Start: 2024-03-11 | End: 2024-04-10

## 2024-03-11 RX ORDER — METHOTREXATE 2.5 MG/1
20 TABLET ORAL WEEKLY
Qty: 32 TABLET | Refills: 2 | Status: SHIPPED | OUTPATIENT
Start: 2024-03-11

## 2024-03-11 RX ORDER — FOLIC ACID 1 MG/1
1 TABLET ORAL DAILY
Qty: 90 TABLET | Refills: 0 | Status: SHIPPED | OUTPATIENT
Start: 2024-03-11

## 2024-03-11 RX ORDER — ADALIMUMAB 40MG/0.4ML
40 KIT SUBCUTANEOUS
Qty: 2 EACH | Refills: 2 | Status: ACTIVE | OUTPATIENT
Start: 2024-03-11

## 2024-03-11 NOTE — PATIENT INSTRUCTIONS
Start gabapentin 200 mg at night time  Continue methotrexate to 8 pills every 7 days  Continue folic acid 1 mg daily  Continue Humira 40mg every 2 weeks   Get labs one week prior to next visit  I plan  to order CT of your right foot and hand after discussions with radiology department  RTC in 2 months       Lab Locations:    [facility]  [address] [place], [place] [address] [place] [address]    Central [phone]

## 2024-03-26 ENCOUNTER — HOSPITAL ENCOUNTER (OUTPATIENT)
Dept: CT IMAGING | Age: 54
Discharge: HOME | End: 2024-03-26
Payer: COMMERCIAL

## 2024-03-26 DIAGNOSIS — M06.09 RHEUMATOID ARTHRITIS OF MULTIPLE SITES WITH NEGATIVE RHEUMATOID FACTOR (HCC): ICD-10-CM

## 2024-03-26 PROCEDURE — 73202 CT UPPR EXTREMITY W/O&W/DYE: CPT

## 2024-03-26 PROCEDURE — 73702 CT LWR EXTREMITY W/O&W/DYE: CPT

## 2024-03-26 PROCEDURE — 2580000003 HC RX 258: Performed by: STUDENT IN AN ORGANIZED HEALTH CARE EDUCATION/TRAINING PROGRAM

## 2024-03-26 PROCEDURE — 6360000004 HC RX CONTRAST MEDICATION: Performed by: STUDENT IN AN ORGANIZED HEALTH CARE EDUCATION/TRAINING PROGRAM

## 2024-03-26 RX ORDER — SODIUM CHLORIDE 9 MG/ML
10 INJECTION, SOLUTION INTRAMUSCULAR; INTRAVENOUS; SUBCUTANEOUS PRN
Status: COMPLETED | OUTPATIENT
Start: 2024-03-26 | End: 2024-03-26

## 2024-03-26 RX ADMIN — IOPAMIDOL 75 ML: 755 INJECTION, SOLUTION INTRAVENOUS at 11:42

## 2024-03-26 RX ADMIN — SODIUM CHLORIDE, PRESERVATIVE FREE 10 ML: 5 INJECTION INTRAVENOUS at 11:00

## 2024-05-09 ENCOUNTER — HOSPITAL ENCOUNTER (OUTPATIENT)
Age: 54
Discharge: HOME | End: 2024-05-09
Payer: COMMERCIAL

## 2024-05-09 DIAGNOSIS — Z79.899 HIGH RISK MEDICATION USE: ICD-10-CM

## 2024-05-09 LAB
ALBUMIN SERPL-MCNC: 4.4 GM/DL (ref 3.4–5)
ALP BLD-CCNC: 113 IU/L (ref 40–128)
ALT SERPL-CCNC: 19 U/L (ref 10–40)
ANION GAP SERPL CALCULATED.3IONS-SCNC: 14 MMOL/L (ref 7–16)
AST SERPL-CCNC: 20 IU/L (ref 15–37)
BILIRUB SERPL-MCNC: 0.3 MG/DL (ref 0–1)
BUN SERPL-MCNC: 15 MG/DL (ref 6–23)
CALCIUM SERPL-MCNC: 9.4 MG/DL (ref 8.3–10.6)
CHLORIDE BLD-SCNC: 101 MMOL/L (ref 99–110)
CO2: 23 MMOL/L (ref 21–32)
CREAT SERPL-MCNC: 1 MG/DL (ref 0.6–1.1)
GFR, ESTIMATED: 67 ML/MIN/1.73M2
GLUCOSE SERPL-MCNC: 226 MG/DL (ref 70–99)
HCT VFR BLD CALC: 40 % (ref 37–47)
HEMOGLOBIN: 12.6 GM/DL (ref 12.5–16)
MCH RBC QN AUTO: 28.5 PG (ref 27–31)
MCHC RBC AUTO-ENTMCNC: 31.5 % (ref 32–36)
MCV RBC AUTO: 90.5 FL (ref 78–100)
PDW BLD-RTO: 14.1 % (ref 11.7–14.9)
PLATELET # BLD: 360 K/CU MM (ref 140–440)
PMV BLD AUTO: 9.9 FL (ref 7.5–11.1)
POTASSIUM SERPL-SCNC: 3.8 MMOL/L (ref 3.5–5.1)
RBC # BLD: 4.42 M/CU MM (ref 4.2–5.4)
SODIUM BLD-SCNC: 138 MMOL/L (ref 135–145)
TOTAL PROTEIN: 7.4 GM/DL (ref 6.4–8.2)
WBC # BLD: 8.6 K/CU MM (ref 4–10.5)

## 2024-05-09 PROCEDURE — 85027 COMPLETE CBC AUTOMATED: CPT

## 2024-05-09 PROCEDURE — 80053 COMPREHEN METABOLIC PANEL: CPT

## 2024-05-09 PROCEDURE — 36415 COLL VENOUS BLD VENIPUNCTURE: CPT

## 2024-05-12 NOTE — PROGRESS NOTES
social, family hx of psoriasis.    Current rheum meds: trazodone 100mg PRN, Tizanidine 4mg Q8H PRN, methotrexate 20 mg weekly, folic acid 1 mg daily, humira 40mg Q 2 weeks    Past rheum meds: Plaquenil 1 tab twice daily, Mobic, robaxin, aspirin, naproxen and vicodin         No data to display                    REVIEW OF SYSTEMS     Constitutional:  Denies fever or chills, decreased appetite, or weight loss   Eyes:  Denies change in visual acuity or eye dryness or irritation  HENT:  Denies dry mouth or oral ulcers  Respiratory:  Denies cough or shortness of breath   Cardiovascular:  Denies chest pain or edema   GI:  Denies abdominal pain, nausea, vomiting, bloody stools or diarrhea   :  Denies dysuria or hematuria  Musculoskeletal:  See HPI  Integument:  Denies rash   Neurologic:  Denies headache, focal weakness or sensory changes   Endocrine:  Denies polyuria or polydipsia   Lymphatic:  Denies swollen glands   Psychiatric:  Denies depression or anxiety       PROBLEM LIST    Patient Active Problem List   Diagnosis    Varicose veins of left lower extremity with inflammation    Varicose veins of lower extremities with complications    Venous hypertension of lower extremity    Varicose veins of left lower extremity with other complications    Status post phlebectomy    Superficial phlebitis of left leg    Calcific tendinitis of right shoulder region    CECE (obstructive sleep apnea)    Obesity (BMI 30-39.9)    Hypersomnia    Ischemic stroke (HCC)    Atypical migraine    TIA (transient ischemic attack)    Hyperglycemia       MEDICATIONS    Current Outpatient Medications   Medication Sig Dispense Refill    methotrexate (RHEUMATREX) 2.5 MG chemo tablet Take 8 tablets by mouth once a week 32 tablet 2    folic acid (FOLVITE) 1 MG tablet Take 1 tablet by mouth daily 90 tablet 0    Adalimumab (HUMIRA, 2 SYRINGE,) 40 MG/0.4ML PSKT Inject 40 mg into the skin every 14 days 2 each 2    gabapentin (NEURONTIN) 100 MG capsule Take

## 2024-05-15 ENCOUNTER — OFFICE VISIT (OUTPATIENT)
Dept: RHEUMATOLOGY | Age: 54
End: 2024-05-15
Payer: COMMERCIAL

## 2024-05-15 VITALS
SYSTOLIC BLOOD PRESSURE: 122 MMHG | HEART RATE: 76 BPM | BODY MASS INDEX: 34.84 KG/M2 | DIASTOLIC BLOOD PRESSURE: 80 MMHG | WEIGHT: 203 LBS | OXYGEN SATURATION: 96 %

## 2024-05-15 DIAGNOSIS — M79.7 FIBROMYALGIA: ICD-10-CM

## 2024-05-15 DIAGNOSIS — M25.50 POLYARTHRALGIA: ICD-10-CM

## 2024-05-15 DIAGNOSIS — Z79.899 HIGH RISK MEDICATION USE: ICD-10-CM

## 2024-05-15 DIAGNOSIS — M05.79 RHEUMATOID ARTHRITIS INVOLVING MULTIPLE SITES WITH POSITIVE RHEUMATOID FACTOR (HCC): Primary | ICD-10-CM

## 2024-05-15 PROCEDURE — G8428 CUR MEDS NOT DOCUMENT: HCPCS | Performed by: STUDENT IN AN ORGANIZED HEALTH CARE EDUCATION/TRAINING PROGRAM

## 2024-05-15 PROCEDURE — 1036F TOBACCO NON-USER: CPT | Performed by: STUDENT IN AN ORGANIZED HEALTH CARE EDUCATION/TRAINING PROGRAM

## 2024-05-15 PROCEDURE — 3017F COLORECTAL CA SCREEN DOC REV: CPT | Performed by: STUDENT IN AN ORGANIZED HEALTH CARE EDUCATION/TRAINING PROGRAM

## 2024-05-15 PROCEDURE — G8417 CALC BMI ABV UP PARAM F/U: HCPCS | Performed by: STUDENT IN AN ORGANIZED HEALTH CARE EDUCATION/TRAINING PROGRAM

## 2024-05-15 PROCEDURE — 99215 OFFICE O/P EST HI 40 MIN: CPT | Performed by: STUDENT IN AN ORGANIZED HEALTH CARE EDUCATION/TRAINING PROGRAM

## 2024-05-15 RX ORDER — ADALIMUMAB 40MG/0.4ML
40 KIT SUBCUTANEOUS
Qty: 2 EACH | Refills: 2 | Status: ACTIVE | OUTPATIENT
Start: 2024-05-15

## 2024-05-15 RX ORDER — PREDNISONE 5 MG/1
5 TABLET ORAL DAILY
Qty: 30 TABLET | Refills: 0 | Status: SHIPPED | OUTPATIENT
Start: 2024-05-15

## 2024-05-15 RX ORDER — FOLIC ACID 1 MG/1
1 TABLET ORAL DAILY
Qty: 90 TABLET | Refills: 0 | Status: SHIPPED | OUTPATIENT
Start: 2024-05-15

## 2024-05-15 RX ORDER — GABAPENTIN 100 MG/1
200 CAPSULE ORAL NIGHTLY
Qty: 60 CAPSULE | Refills: 2 | Status: SHIPPED | OUTPATIENT
Start: 2024-05-15 | End: 2024-08-13

## 2024-05-15 RX ORDER — METHOTREXATE 2.5 MG/1
20 TABLET ORAL WEEKLY
Qty: 32 TABLET | Refills: 2 | Status: SHIPPED | OUTPATIENT
Start: 2024-05-15

## 2024-05-15 NOTE — PATIENT INSTRUCTIONS
Continue gabapentin 200 mg at night time  Continue methotrexate to 8 pills every 7 days  Continue folic acid 1 mg daily  Continue Humira 40mg every 2 weeks   Get labs one week prior to next visit  RTC in 3 months       Lab Location:    [facility]  [address] [place] [id number]  You must be at the lab by 3:00 p.m.   for the [facility] labs to be completed.    To Schedule DEXA Bone Scan, CT or MRI  Call at Central [phone]

## 2024-07-02 LAB
ALBUMIN: 3.8 G/DL
ALP BLD-CCNC: 98 U/L
ALT SERPL-CCNC: 16 U/L
ANION GAP SERPL CALCULATED.3IONS-SCNC: ABNORMAL MMOL/L
AST SERPL-CCNC: 14 U/L
BASOPHILS ABSOLUTE: 0.1 /ΜL
BASOPHILS RELATIVE PERCENT: 1 %
BILIRUB SERPL-MCNC: 0.4 MG/DL (ref 0.1–1.4)
BUN BLDV-MCNC: 16 MG/DL
CALCIUM SERPL-MCNC: 4.9 MG/DL
CALCIUM SERPL-MCNC: 8.9 MG/DL
CHLORIDE BLD-SCNC: 106 MMOL/L
CHOLESTEROL, TOTAL: 221 MG/DL
CHOLESTEROL/HDL RATIO: NORMAL
CO2: 23 MMOL/L
CREAT SERPL-MCNC: 0.64 MG/DL
EOSINOPHILS ABSOLUTE: 0.4 /ΜL
EOSINOPHILS RELATIVE PERCENT: 5 %
ESTIMATED AVERAGE GLUCOSE: NORMAL
GFR, ESTIMATED: ABNORMAL
GLUCOSE BLD-MCNC: 133 MG/DL
HBA1C MFR BLD: 9.1 %
HCT VFR BLD CALC: 40.2 % (ref 36–46)
HDLC SERPL-MCNC: 56 MG/DL (ref 35–70)
HEMOGLOBIN: 12.9 G/DL (ref 12–16)
LDL CHOLESTEROL: 141
LYMPHOCYTES ABSOLUTE: 3.7 /ΜL
LYMPHOCYTES RELATIVE PERCENT: 44 %
MAGNESIUM: 1.8 MG/DL
MCH RBC QN AUTO: 27.6 PG
MCHC RBC AUTO-ENTMCNC: 32.1 G/DL
MCV RBC AUTO: 86 FL
MONOCYTES ABSOLUTE: 0.6 /ΜL
MONOCYTES RELATIVE PERCENT: 8 %
NEUTROPHILS ABSOLUTE: 3.5 /ΜL
NEUTROPHILS RELATIVE PERCENT: 42 %
NONHDLC SERPL-MCNC: NORMAL MG/DL
PLATELET # BLD: 311 K/ΜL
PMV BLD AUTO: NORMAL FL
POTASSIUM SERPL-SCNC: 4.2 MMOL/L
RBC # BLD: 4.67 10^6/ΜL
SODIUM BLD-SCNC: 140 MMOL/L
TOTAL PROTEIN: 6.4 G/DL (ref 6.4–8.2)
TRIGL SERPL-MCNC: 137 MG/DL
TSH SERPL DL<=0.05 MIU/L-ACNC: 1.16 UIU/ML
VLDLC SERPL CALC-MCNC: 24 MG/DL
WBC # BLD: 8.3 10^3/ML

## 2024-07-08 LAB
ALBUMIN: 3.8 G/DL
ALP BLD-CCNC: 98 U/L
ALT SERPL-CCNC: 16 U/L
ANION GAP SERPL CALCULATED.3IONS-SCNC: NORMAL MMOL/L
AST SERPL-CCNC: 14 U/L
BASOPHILS ABSOLUTE: 0.1 /ΜL
BASOPHILS RELATIVE PERCENT: 1 %
BILIRUB SERPL-MCNC: 0.4 MG/DL (ref 0.1–1.4)
BILIRUBIN, URINE: NEGATIVE
BLOOD, URINE: NEGATIVE
BUN BLDV-MCNC: 10 MG/DL
C-REACTIVE PROTEIN: 2.7
CALCIUM SERPL-MCNC: 8.9 MG/DL
CHLORIDE BLD-SCNC: 106 MMOL/L
CHOLESTEROL, TOTAL: 221 MG/DL
CHOLESTEROL/HDL RATIO: 3.9
CLARITY, UA: CLEAR
CO2: 23 MMOL/L
COLOR, UA: YELLOW
CREAT SERPL-MCNC: 0.64 MG/DL
EOSINOPHILS ABSOLUTE: 0 /ΜL
EOSINOPHILS RELATIVE PERCENT: 5 %
ESTIMATED AVERAGE GLUCOSE: ABNORMAL
FOLATE: 12.8
GFR, ESTIMATED: 105
GLUCOSE BLD-MCNC: 133 MG/DL
GLUCOSE URINE: ABNORMAL
HBA1C MFR BLD: 9.1 %
HCT VFR BLD CALC: 40.2 % (ref 36–46)
HDLC SERPL-MCNC: 56 MG/DL (ref 35–70)
HEMOGLOBIN: 12.9 G/DL (ref 12–16)
KETONES, URINE: NEGATIVE
LDL CHOLESTEROL DIRECT: 142 MG/DL
LDL CHOLESTEROL: 141
LEUKOCYTE ESTERASE, URINE: NEGATIVE
LYMPHOCYTES ABSOLUTE: 3.7 /ΜL
LYMPHOCYTES RELATIVE PERCENT: 44 %
MAGNESIUM: 1.8 MG/DL
MCH RBC QN AUTO: 27.6 PG
MCHC RBC AUTO-ENTMCNC: 32.1 G/DL
MCV RBC AUTO: 86 FL
MONOCYTES ABSOLUTE: 0.6 /ΜL
MONOCYTES RELATIVE PERCENT: 8 %
NEUTROPHILS ABSOLUTE: 3.5 /ΜL
NEUTROPHILS RELATIVE PERCENT: 42 %
NITRITE, URINE: NEGATIVE
NONHDLC SERPL-MCNC: ABNORMAL MG/DL
PH UA: 6 (ref 4.5–8)
PLATELET # BLD: 311 K/ΜL
PMV BLD AUTO: ABNORMAL FL
POTASSIUM SERPL-SCNC: 4.2 MMOL/L
PROTEIN UA: NEGATIVE
RBC # BLD: 4.67 10^6/ΜL
SED RATE, AUTOMATED: 6
SODIUM BLD-SCNC: 140 MMOL/L
SPECIFIC GRAVITY UA: 1.02 (ref 1–1.03)
TOTAL PROTEIN: 6.4 G/DL (ref 6.4–8.2)
TRIGL SERPL-MCNC: 137 MG/DL
TSH SERPL DL<=0.05 MIU/L-ACNC: 1.16 UIU/ML
UROBILINOGEN, URINE: ABNORMAL
VITAMIN B-12: 304
VITAMIN D 25-HYDROXY: 34.4
VITAMIN D2, 25 HYDROXY: NORMAL
VITAMIN D3,25 HYDROXY: NORMAL
VLDLC SERPL CALC-MCNC: 24 MG/DL
WBC # BLD: 8.3 10^3/ML

## 2024-08-12 ENCOUNTER — HOSPITAL ENCOUNTER (OUTPATIENT)
Age: 54
Discharge: HOME | End: 2024-08-12
Payer: COMMERCIAL

## 2024-08-12 DIAGNOSIS — M25.50 POLYARTHRALGIA: ICD-10-CM

## 2024-08-12 LAB
ALBUMIN SERPL-MCNC: 4.1 GM/DL (ref 3.4–5)
ALP BLD-CCNC: 117 IU/L (ref 40–128)
ALT SERPL-CCNC: 19 U/L (ref 10–40)
ANION GAP SERPL CALCULATED.3IONS-SCNC: 15 MMOL/L (ref 7–16)
AST SERPL-CCNC: 18 IU/L (ref 15–37)
BILIRUB SERPL-MCNC: 0.5 MG/DL (ref 0–1)
BUN SERPL-MCNC: 12 MG/DL (ref 6–23)
CALCIUM SERPL-MCNC: 9.3 MG/DL (ref 8.3–10.6)
CHLORIDE BLD-SCNC: 102 MMOL/L (ref 99–110)
CO2: 23 MMOL/L (ref 21–32)
CREAT SERPL-MCNC: 0.9 MG/DL (ref 0.6–1.1)
GFR, ESTIMATED: 76 ML/MIN/1.73M2
GLUCOSE SERPL-MCNC: 258 MG/DL (ref 70–99)
HCT VFR BLD CALC: 42.2 % (ref 37–47)
HEMOGLOBIN: 13 GM/DL (ref 12.5–16)
MCH RBC QN AUTO: 27.3 PG (ref 27–31)
MCHC RBC AUTO-ENTMCNC: 30.8 % (ref 32–36)
MCV RBC AUTO: 88.7 FL (ref 78–100)
PDW BLD-RTO: 14.1 % (ref 11.7–14.9)
PLATELET # BLD: 340 K/CU MM (ref 140–440)
PMV BLD AUTO: 9.9 FL (ref 7.5–11.1)
POTASSIUM SERPL-SCNC: 4.2 MMOL/L (ref 3.5–5.1)
RBC # BLD: 4.76 M/CU MM (ref 4.2–5.4)
SODIUM BLD-SCNC: 140 MMOL/L (ref 135–145)
TOTAL PROTEIN: 7.6 GM/DL (ref 6.4–8.2)
WBC # BLD: 8.8 K/CU MM (ref 4–10.5)

## 2024-08-12 PROCEDURE — 36415 COLL VENOUS BLD VENIPUNCTURE: CPT

## 2024-08-12 PROCEDURE — 80053 COMPREHEN METABOLIC PANEL: CPT

## 2024-08-12 PROCEDURE — 85027 COMPLETE CBC AUTOMATED: CPT

## 2024-08-14 NOTE — PROGRESS NOTES
daily  Continue Humira 40mg every 2 weeks   Start Cymbalta 1 tab daily   Get labs one week prior to next visit  RTC in 2 months       -  The patient indicates understanding of these issues and agrees with the plan.    I spent 40 minutes on the date of service, preparing to see the patient (eg, review of tests), obtaining and/or reviewing separately obtained history, counseling and educating the family/caregiver, ordering medications, tests, or procedures, and documenting clinical information in the electronic or other health record, care coordination (not separately reported)      Suhas Sweet MD    Portions of this note was copied forward from the note written by me on 5/15/2023. I have reviewed and updated the history, physical exam, data, assessment and plan of the note so that it reflects the current evaluation and management of the patient.

## 2024-08-16 ENCOUNTER — OFFICE VISIT (OUTPATIENT)
Dept: RHEUMATOLOGY | Age: 54
End: 2024-08-16
Payer: COMMERCIAL

## 2024-08-16 VITALS
DIASTOLIC BLOOD PRESSURE: 75 MMHG | WEIGHT: 203 LBS | HEART RATE: 76 BPM | SYSTOLIC BLOOD PRESSURE: 105 MMHG | BODY MASS INDEX: 34.84 KG/M2

## 2024-08-16 DIAGNOSIS — M25.50 POLYARTHRALGIA: ICD-10-CM

## 2024-08-16 DIAGNOSIS — M79.7 FIBROMYALGIA: ICD-10-CM

## 2024-08-16 DIAGNOSIS — Z79.899 HIGH RISK MEDICATION USE: ICD-10-CM

## 2024-08-16 DIAGNOSIS — G89.29 CHRONIC BILATERAL LOW BACK PAIN WITHOUT SCIATICA: ICD-10-CM

## 2024-08-16 DIAGNOSIS — M05.79 RHEUMATOID ARTHRITIS INVOLVING MULTIPLE SITES WITH POSITIVE RHEUMATOID FACTOR (HCC): ICD-10-CM

## 2024-08-16 DIAGNOSIS — M06.09 RHEUMATOID ARTHRITIS OF MULTIPLE SITES WITH NEGATIVE RHEUMATOID FACTOR (HCC): Primary | ICD-10-CM

## 2024-08-16 DIAGNOSIS — M54.50 CHRONIC BILATERAL LOW BACK PAIN WITHOUT SCIATICA: ICD-10-CM

## 2024-08-16 PROCEDURE — G8427 DOCREV CUR MEDS BY ELIG CLIN: HCPCS | Performed by: STUDENT IN AN ORGANIZED HEALTH CARE EDUCATION/TRAINING PROGRAM

## 2024-08-16 PROCEDURE — 1036F TOBACCO NON-USER: CPT | Performed by: STUDENT IN AN ORGANIZED HEALTH CARE EDUCATION/TRAINING PROGRAM

## 2024-08-16 PROCEDURE — G8417 CALC BMI ABV UP PARAM F/U: HCPCS | Performed by: STUDENT IN AN ORGANIZED HEALTH CARE EDUCATION/TRAINING PROGRAM

## 2024-08-16 PROCEDURE — 3017F COLORECTAL CA SCREEN DOC REV: CPT | Performed by: STUDENT IN AN ORGANIZED HEALTH CARE EDUCATION/TRAINING PROGRAM

## 2024-08-16 PROCEDURE — 99215 OFFICE O/P EST HI 40 MIN: CPT | Performed by: STUDENT IN AN ORGANIZED HEALTH CARE EDUCATION/TRAINING PROGRAM

## 2024-08-16 RX ORDER — ADALIMUMAB 40MG/0.4ML
40 KIT SUBCUTANEOUS
Qty: 2 EACH | Refills: 2 | Status: ACTIVE | OUTPATIENT
Start: 2024-08-16

## 2024-08-16 RX ORDER — DULOXETIN HYDROCHLORIDE 30 MG/1
30 CAPSULE, DELAYED RELEASE ORAL DAILY
Qty: 30 CAPSULE | Refills: 1 | Status: SHIPPED | OUTPATIENT
Start: 2024-08-16

## 2024-08-16 RX ORDER — METHOTREXATE 2.5 MG/1
20 TABLET ORAL WEEKLY
Qty: 32 TABLET | Refills: 2 | Status: SHIPPED | OUTPATIENT
Start: 2024-08-16

## 2024-08-16 RX ORDER — FOLIC ACID 1 MG/1
1 TABLET ORAL DAILY
Qty: 90 TABLET | Refills: 0 | Status: SHIPPED | OUTPATIENT
Start: 2024-08-16

## 2024-08-16 NOTE — PATIENT INSTRUCTIONS
Continue methotrexate 8 pills every 7 days  Continue folic acid 1 mg daily  Continue Humira 40mg every 2 weeks   Start Cymbalta 1 tab daily   Get labs one week prior to next visit  RTC in 2 months

## 2024-08-21 ENCOUNTER — TELEPHONE (OUTPATIENT)
Dept: CARDIOLOGY CLINIC | Age: 54
End: 2024-08-21

## 2024-08-24 ENCOUNTER — HOSPITAL ENCOUNTER (EMERGENCY)
Age: 54
Discharge: HOME | End: 2024-08-25
Attending: STUDENT IN AN ORGANIZED HEALTH CARE EDUCATION/TRAINING PROGRAM
Payer: COMMERCIAL

## 2024-08-24 DIAGNOSIS — G97.1 POST-DURAL PUNCTURE HEADACHE: Primary | ICD-10-CM

## 2024-08-24 PROCEDURE — 6370000000 HC RX 637 (ALT 250 FOR IP): Performed by: STUDENT IN AN ORGANIZED HEALTH CARE EDUCATION/TRAINING PROGRAM

## 2024-08-24 PROCEDURE — 93005 ELECTROCARDIOGRAM TRACING: CPT | Performed by: STUDENT IN AN ORGANIZED HEALTH CARE EDUCATION/TRAINING PROGRAM

## 2024-08-24 PROCEDURE — 99284 EMERGENCY DEPT VISIT MOD MDM: CPT

## 2024-08-24 RX ORDER — 0.9 % SODIUM CHLORIDE 0.9 %
1000 INTRAVENOUS SOLUTION INTRAVENOUS ONCE
Status: COMPLETED | OUTPATIENT
Start: 2024-08-24 | End: 2024-08-25

## 2024-08-24 RX ORDER — MAGNESIUM SULFATE IN WATER 40 MG/ML
2000 INJECTION, SOLUTION INTRAVENOUS ONCE
Status: COMPLETED | OUTPATIENT
Start: 2024-08-24 | End: 2024-08-25

## 2024-08-24 RX ORDER — METOCLOPRAMIDE 10 MG/1
10 TABLET ORAL ONCE
Status: COMPLETED | OUTPATIENT
Start: 2024-08-24 | End: 2024-08-24

## 2024-08-24 RX ORDER — ACETAMINOPHEN 500 MG
1000 TABLET ORAL ONCE
Status: COMPLETED | OUTPATIENT
Start: 2024-08-24 | End: 2024-08-24

## 2024-08-24 RX ORDER — DEXAMETHASONE SODIUM PHOSPHATE 10 MG/ML
6 INJECTION, SOLUTION INTRAMUSCULAR; INTRAVENOUS ONCE
Status: COMPLETED | OUTPATIENT
Start: 2024-08-24 | End: 2024-08-25

## 2024-08-24 RX ADMIN — ACETAMINOPHEN 1000 MG: 500 TABLET ORAL at 23:52

## 2024-08-24 RX ADMIN — METOCLOPRAMIDE 10 MG: 10 TABLET ORAL at 23:52

## 2024-08-24 ASSESSMENT — PAIN - FUNCTIONAL ASSESSMENT: PAIN_FUNCTIONAL_ASSESSMENT: 0-10

## 2024-08-24 ASSESSMENT — LIFESTYLE VARIABLES
HOW OFTEN DO YOU HAVE A DRINK CONTAINING ALCOHOL: NEVER
HOW MANY STANDARD DRINKS CONTAINING ALCOHOL DO YOU HAVE ON A TYPICAL DAY: PATIENT DOES NOT DRINK

## 2024-08-24 ASSESSMENT — PAIN DESCRIPTION - LOCATION: LOCATION: HEAD

## 2024-08-24 ASSESSMENT — PAIN SCALES - GENERAL
PAINLEVEL_OUTOF10: 10
PAINLEVEL_OUTOF10: 10

## 2024-08-25 ENCOUNTER — APPOINTMENT (OUTPATIENT)
Dept: CT IMAGING | Age: 54
End: 2024-08-25
Payer: COMMERCIAL

## 2024-08-25 VITALS
WEIGHT: 210 LBS | RESPIRATION RATE: 11 BRPM | TEMPERATURE: 98.1 F | SYSTOLIC BLOOD PRESSURE: 142 MMHG | BODY MASS INDEX: 36.05 KG/M2 | HEART RATE: 66 BPM | OXYGEN SATURATION: 97 % | DIASTOLIC BLOOD PRESSURE: 74 MMHG

## 2024-08-25 LAB
EKG ATRIAL RATE: 73 BPM
EKG DIAGNOSIS: NORMAL
EKG P AXIS: 49 DEGREES
EKG P-R INTERVAL: 156 MS
EKG Q-T INTERVAL: 388 MS
EKG QRS DURATION: 86 MS
EKG QTC CALCULATION (BAZETT): 427 MS
EKG R AXIS: 8 DEGREES
EKG T AXIS: 56 DEGREES
EKG VENTRICULAR RATE: 73 BPM

## 2024-08-25 PROCEDURE — 96375 TX/PRO/DX INJ NEW DRUG ADDON: CPT

## 2024-08-25 PROCEDURE — 2580000003 HC RX 258: Performed by: STUDENT IN AN ORGANIZED HEALTH CARE EDUCATION/TRAINING PROGRAM

## 2024-08-25 PROCEDURE — 6360000002 HC RX W HCPCS: Performed by: STUDENT IN AN ORGANIZED HEALTH CARE EDUCATION/TRAINING PROGRAM

## 2024-08-25 PROCEDURE — 70450 CT HEAD/BRAIN W/O DYE: CPT

## 2024-08-25 PROCEDURE — 96365 THER/PROPH/DIAG IV INF INIT: CPT

## 2024-08-25 PROCEDURE — 93010 ELECTROCARDIOGRAM REPORT: CPT | Performed by: INTERNAL MEDICINE

## 2024-08-25 RX ORDER — METOCLOPRAMIDE 10 MG/1
10 TABLET ORAL 4 TIMES DAILY PRN
Qty: 20 TABLET | Refills: 0 | Status: SHIPPED | OUTPATIENT
Start: 2024-08-25 | End: 2024-08-30

## 2024-08-25 RX ADMIN — DEXAMETHASONE SODIUM PHOSPHATE 6 MG: 10 INJECTION, SOLUTION INTRAMUSCULAR; INTRAVENOUS at 00:03

## 2024-08-25 RX ADMIN — MAGNESIUM SULFATE HEPTAHYDRATE 2000 MG: 40 INJECTION, SOLUTION INTRAVENOUS at 00:03

## 2024-08-25 RX ADMIN — SODIUM CHLORIDE 1000 ML: 9 INJECTION, SOLUTION INTRAVENOUS at 00:02

## 2024-08-25 ASSESSMENT — PAIN - FUNCTIONAL ASSESSMENT: PAIN_FUNCTIONAL_ASSESSMENT: 0-10

## 2024-08-25 ASSESSMENT — PAIN DESCRIPTION - LOCATION: LOCATION: HEAD

## 2024-08-25 ASSESSMENT — PAIN SCALES - GENERAL
PAINLEVEL_OUTOF10: 10
PAINLEVEL_OUTOF10: 7

## 2024-08-25 NOTE — ED PROVIDER NOTES
Past Medical History:   Diagnosis Date    Arthritis     Neck    Asthma     PCP - last episode: 2013    Diabetes mellitus (HCC)     Type II - follows with PCP    Ehler's-Danlos syndrome Dx: 2000    PCP    Fibromyalgia     Hx of blood clots 2000    Left lower leg - unsure of treatment    Hypertension     Middle cerebral aneurysm 2002    surgery @ [facility]    Mitral valve regurgitation     Originally saw Dr. Hernandez, now follows with PCP (updated 1/13/2021)    Raynauds syndrome     Short-term memory loss 2002    due to aneurysm per pt     Patient Active Problem List   Diagnosis Code    Varicose veins of left lower extremity with inflammation I83.12    Varicose veins of lower extremities with complications I83.899    Venous hypertension of lower extremity I87.309    Varicose veins of left lower extremity with other complications I83.892    Status post phlebectomy Z98.890    Superficial phlebitis of left leg I80.02    Calcific tendinitis of right shoulder region M75.31    CECE (obstructive sleep apnea) G47.33    Obesity (BMI 30-39.9) E66.9    Hypersomnia G47.10    Ischemic stroke (HCC) I63.9    Atypical migraine G43.009    TIA (transient ischemic attack) G45.9    Hyperglycemia R73.9     Family History   Problem Relation Age of Onset    Diabetes Mother     Diabetes Father     Heart Disease Father      No current facility-administered medications for this encounter.    Current Outpatient Medications:     metoclopramide (REGLAN) 10 MG tablet, Take 1 tablet by mouth 4 times daily as needed (nausea or vomiting) WARNING:  May cause drowsiness.  May impair ability to operate vehicles or machinery.  Do not use in combination with alcohol., Disp: 20 tablet, Rfl: 0    Adalimumab (HUMIRA, 2 SYRINGE,) 40 MG/0.4ML PSKT, Inject 40 mg into the skin every 14 days, Disp: 2 each, Rfl: 2    methotrexate (RHEUMATREX) 2.5 MG chemo tablet, Take 8 tablets by mouth once a week, Disp: 32 tablet, Rfl: 2    DULoxetine (CYMBALTA) 30 MG extended  2024 2326 CT lumbar spine 8/21/24:  \"IMPRESSION:   Redemonstration of a number of abnormalities at the L4-L5 and posterior to the   L5 vertebral body. Annulus bulge with small disc herniation centrally at the   L4-L5 level which indents the thecal sac. Interval resolution of the   impression on the thecal sac at the left posterior L5 level which  likely   represented a disc extrusion, with resultant decompression of the left   descending L5 and S1 nerve roots. An impression on the right aspect of the   thecal sac at the L4-L5 level is unchanged and likely related to facet   hypertrophy and ligamentous hypertrophy consistent with degenerative changes,   although a synovial cyst would be difficult to exclude. There is resultant   unchanged mild to moderate stenosis in the transverse dimension at L4-L5.   \" [CR]   2327 EKG interpretation:  Sinus rhythm.  Normal rate 73 bpm.  Parable 156 QRS 86  QTc 427.  No significant acute ischemic ST changes.  Voltage criteria for LVH without strain pattern.  Normal axis.  As interpreted by me. [CR]   Sun Aug 25, 2024   0038 CTH:IMPRESSION:  No CT evidence of acute intracranial abnormality.   [CR]   0044 Says she is still having headache.  Will reach out and see if IR is available for blood patch [CR]   0047 Spoke with IR, Dr Charles Tran, discussed case, says that he does not routinely do blood patches and is not particularly comfortable with the procedure.  Discussed this with patient, did offer transfer to hospital with resources for blood patch versus discharge with outpatient follow-up.  She says she would rather just go home and will try to sleep it off and see if she can maybe get 1 done through [facility] or outpatient.  Discussed strict return precautions and she verbalized understanding and agreement.  Patient otherwise well-appearing.  To be discharged. [CR]      ED Course User Index  [CR] Ranjith Barksdale MD       ED Medications administered this visit:  (None if

## 2024-08-25 NOTE — DISCHARGE INSTRUCTIONS
You can use Tylenol as needed for pain  You can use Reglan as needed for your symptoms  You can try and follow-up with [facility] or [facility] to see if they can get a blood patch for you  Call and follow-up with your family doctor in the next 1-3 days  Return to the ED if your symptoms worsen or you feel you need to be reevaluated

## 2024-08-26 ENCOUNTER — HOSPITAL ENCOUNTER (EMERGENCY)
Age: 54
Discharge: ANOTHER HOSPITAL | End: 2024-08-26
Payer: COMMERCIAL

## 2024-08-26 VITALS
TEMPERATURE: 97.3 F | SYSTOLIC BLOOD PRESSURE: 137 MMHG | HEART RATE: 68 BPM | RESPIRATION RATE: 18 BRPM | DIASTOLIC BLOOD PRESSURE: 80 MMHG | OXYGEN SATURATION: 93 %

## 2024-08-26 DIAGNOSIS — G97.1 POST-DURAL PUNCTURE HEADACHE: Primary | ICD-10-CM

## 2024-08-26 LAB — MAGNESIUM: 1.7 MG/DL (ref 1.8–2.4)

## 2024-08-26 PROCEDURE — 83735 ASSAY OF MAGNESIUM: CPT

## 2024-08-26 PROCEDURE — 6370000000 HC RX 637 (ALT 250 FOR IP)

## 2024-08-26 PROCEDURE — 2580000003 HC RX 258

## 2024-08-26 PROCEDURE — 96374 THER/PROPH/DIAG INJ IV PUSH: CPT

## 2024-08-26 PROCEDURE — 99285 EMERGENCY DEPT VISIT HI MDM: CPT

## 2024-08-26 PROCEDURE — 96375 TX/PRO/DX INJ NEW DRUG ADDON: CPT

## 2024-08-26 PROCEDURE — 6360000002 HC RX W HCPCS

## 2024-08-26 RX ORDER — SODIUM CHLORIDE, SODIUM LACTATE, POTASSIUM CHLORIDE, AND CALCIUM CHLORIDE .6; .31; .03; .02 G/100ML; G/100ML; G/100ML; G/100ML
1000 INJECTION, SOLUTION INTRAVENOUS ONCE
Status: COMPLETED | OUTPATIENT
Start: 2024-08-26 | End: 2024-08-26

## 2024-08-26 RX ORDER — KETOROLAC TROMETHAMINE 15 MG/ML
15 INJECTION, SOLUTION INTRAMUSCULAR; INTRAVENOUS ONCE
Status: COMPLETED | OUTPATIENT
Start: 2024-08-26 | End: 2024-08-26

## 2024-08-26 RX ORDER — ACETAMINOPHEN 500 MG
1000 TABLET ORAL ONCE
Status: COMPLETED | OUTPATIENT
Start: 2024-08-26 | End: 2024-08-26

## 2024-08-26 RX ORDER — METOCLOPRAMIDE HYDROCHLORIDE 5 MG/ML
10 INJECTION INTRAMUSCULAR; INTRAVENOUS ONCE
Status: COMPLETED | OUTPATIENT
Start: 2024-08-26 | End: 2024-08-26

## 2024-08-26 RX ADMIN — ACETAMINOPHEN 1000 MG: 500 TABLET ORAL at 16:26

## 2024-08-26 RX ADMIN — SODIUM CHLORIDE, POTASSIUM CHLORIDE, SODIUM LACTATE AND CALCIUM CHLORIDE 1000 ML: 600; 310; 30; 20 INJECTION, SOLUTION INTRAVENOUS at 16:29

## 2024-08-26 RX ADMIN — KETOROLAC TROMETHAMINE 15 MG: 15 INJECTION, SOLUTION INTRAMUSCULAR; INTRAVENOUS at 16:26

## 2024-08-26 RX ADMIN — METOCLOPRAMIDE 10 MG: 5 INJECTION, SOLUTION INTRAMUSCULAR; INTRAVENOUS at 18:32

## 2024-08-26 ASSESSMENT — PAIN DESCRIPTION - LOCATION: LOCATION: HEAD

## 2024-08-26 ASSESSMENT — PAIN SCALES - GENERAL
PAINLEVEL_OUTOF10: 10
PAINLEVEL_OUTOF10: 10

## 2024-08-26 NOTE — ED PROVIDER NOTES
Emergency Department Encounter  Location: [facility] EMERGENCY DEPARTMENT    Patient: Malorie Green  MRN: [id number]  : 1970  Date of evaluation: 2024  ED Provider: Clovis Weston DO    Chief Complaint:    Headache (Pt states migraine has not gone a way since spine procedure )    [place]:  Malorie Green is a 54 y.o. female that presents to the emergency department today with a headache that she has had for approximately 5 days.  Patient states she had headache shortly after having a myelogram.  Patient was instructed to go to the emergency department and she came to this emergency department 2 days ago.  While here she was evaluated for blood patch, but did not receive a blood patch because no provider was able to complete procedure.  She declined transfer at that time.  She continues to have severe headache that does not get better with Tylenol.  It does get better when she lays flat but worse when she sits up.  Photophobic in nature.      Past Medical History:   Diagnosis Date    Arthritis     Neck    Asthma     PCP - last episode:     Diabetes mellitus (HCC)     Type II - follows with PCP    Ehler's-Danlos syndrome Dx: 2000    PCP    Fibromyalgia     Hx of blood clots     Left lower leg - unsure of treatment    Hypertension     Middle cerebral aneurysm     surgery @ [facility]    Mitral valve regurgitation     Originally saw Dr. Hernandez, now follows with PCP (updated 2021)    Raynauds syndrome     Short-term memory loss     due to aneurysm per pt     Past Surgical History:   Procedure Laterality Date    ANKLE SURGERY Right 2010    surgery x2    BICEPS TENDON REPAIR Left     in left 5th digit    BRAIN SURGERY  2002    aneurysm repair     SECTION  1996    COLONOSCOPY  2020    Diverticulosis - Dr. Gomez    ENDOSCOPY, COLON, DIAGNOSTIC  2020    \"Took biopsy and was irregular\", has to repeat in 2021    NERVE SURGERY Left  (CYMBALTA) 30 MG extended release capsule Take 1 capsule by mouth daily 30 capsule 1    folic acid (FOLVITE) 1 MG tablet Take 1 tablet by mouth daily 90 tablet 0    gabapentin (NEURONTIN) 100 MG capsule Take 2 capsules by mouth at bedtime for 90 days. Intended supply: 90 days (Patient taking differently: Take 2 capsules by mouth at bedtime. 400mg total daily) 60 capsule 2    sharps container 1 each by Does not apply route as needed (as needed every 3 months) (Patient not taking: Reported on 5/15/2024) 1 each 3    Fremanezumab-vfrm (AJOVY) 225 MG/1.5ML SOAJ Inject 225 mg into the skin every 30 days      REYVOW 100 MG TABS TAKE 1 TABLET BY MOUTH AS NEEDED FOR MIGRAINE, TAKE AT MIGRAINE ONSET, DO NOT REPEAT DOSE WITHIN 24 HOURS, NO DRIVING FOR 8 HOURS FOLLOWING      traZODone (DESYREL) 100 MG tablet Take 1 tablet by mouth nightly at bedtime.      empagliflozin (JARDIANCE) 10 MG tablet Take 1 tablet by mouth daily      potassium chloride (KLOR-CON M) 20 MEQ TBCR extended release tablet Take 2 tablets by mouth daily      diclofenac sodium (VOLTAREN) 1 % GEL       acyclovir (ZOVIRAX) 400 MG tablet Take 1 tablet by mouth daily      tiZANidine (ZANAFLEX) 4 MG tablet Take 1 tablet by mouth nightly Take 1/2 tab nightly for 8 nights then increase to full tab. (Patient taking differently: Take 1 tablet by mouth nightly) 30 tablet 3    aspirin 81 MG EC tablet Take 1 tablet by mouth daily 30 tablet 3    Continuous Blood Gluc Sensor (DEXCOM G6 SENSOR) MISC       insulin aspart (NOVOLOG FLEXPEN) 100 UNIT/ML injection pen COVER WITH LOW DOSE SLIDING SCALE APPROXIMATELY 20 TO 30 MINUTES AFTER START OF MEAL 3 TIMES DAILY -MAX OF 24 UNITS DAILY      OZEMPIC, 0.25 OR 0.5 MG/DOSE, 2 MG/1.5ML SOPN       losartan-hydroCHLOROthiazide (HYZAAR) 100-25 MG per tablet Take 1 tablet by mouth daily      famotidine (PEPCID) 20 MG tablet       oxybutynin (DITROPAN-XL) 5 MG extended release tablet Take 1 tablet by mouth 2 times daily

## 2024-09-06 ENCOUNTER — INITIAL CONSULT (OUTPATIENT)
Dept: CARDIOLOGY CLINIC | Age: 54
End: 2024-09-06
Payer: COMMERCIAL

## 2024-09-06 VITALS
DIASTOLIC BLOOD PRESSURE: 78 MMHG | HEIGHT: 64 IN | WEIGHT: 218.8 LBS | SYSTOLIC BLOOD PRESSURE: 136 MMHG | BODY MASS INDEX: 37.36 KG/M2 | OXYGEN SATURATION: 96 % | HEART RATE: 72 BPM

## 2024-09-06 DIAGNOSIS — R00.2 PALPITATION: Primary | ICD-10-CM

## 2024-09-06 DIAGNOSIS — R00.2 PALPITATIONS: ICD-10-CM

## 2024-09-06 PROCEDURE — G8427 DOCREV CUR MEDS BY ELIG CLIN: HCPCS | Performed by: INTERNAL MEDICINE

## 2024-09-06 PROCEDURE — 93000 ELECTROCARDIOGRAM COMPLETE: CPT | Performed by: INTERNAL MEDICINE

## 2024-09-06 PROCEDURE — G8417 CALC BMI ABV UP PARAM F/U: HCPCS | Performed by: INTERNAL MEDICINE

## 2024-09-06 PROCEDURE — 99244 OFF/OP CNSLTJ NEW/EST MOD 40: CPT | Performed by: INTERNAL MEDICINE

## 2024-09-06 NOTE — PATIENT INSTRUCTIONS
Please be informed that if you contact our office outside of normal business hours the physician on call cannot help with any scheduling or rescheduling issues, procedure instruction questions or any type of medication issue.    We advise you for any urgent/emergency that you go to the nearest emergency room!    PLEASE CALL OUR OFFICE DURING NORMAL BUSINESS HOURS    Monday - Friday   8 am to 5 pm    [place]: [phone]    [place]: [phone]    [place]:  [phone]    **It is YOUR responsibilty to bring medication bottles and/or updated medication list to EACH APPOINTMENT. This will allow us to better serve you and all your healthcare needs**    Thank you for allowing us to care for you today!   We want to ensure we can follow your treatment plan and we strive to give you the best outcomes and experience possible.   If you ever have a life threatening emergency and call 911 - for an ambulance (EMS)   Our providers can only care for you at:   [facility] or [facility].   Even if you have someone take you or you drive yourself we can only care for you in a [facility] facility. Our providers are not setup at the other healthcare locations!

## 2024-09-06 NOTE — PROGRESS NOTES
CARDIOLOGY CONSULT NOTE   Reason for consultation:  palpitation    Referring physician:  No admitting provider for patient encounter.     Primary care physician: Oswaldo Sharp MD      Dear   Thanks for the consult.    History of present illness:Malorie is a 54 y.o.year old who  presents with  Presents for palpitations and pounding for last few montsh, its intermittent, severe in intensity, pounding like duration is for few minutes, happens while sitting,not associated with shortness of breath, chest pain although has some dizziness.she is diagnosed with [facility]    Chief Complaint   Patient presents with    Consultation     Pt denies other cardiac symptoms      Palpitations     Fluttering      Edema     Ankles bilat       Blood pressure, cholesterol, blood glucose and weight are well controlled.    Past medical history:    has a past medical history of Arthritis, Asthma, Diabetes mellitus (HCC), Ehler's-Danlos syndrome, Fibromyalgia, Hx of blood clots, Hypertension, Middle cerebral aneurysm, Mitral valve regurgitation, Raynauds syndrome, and Short-term memory loss.  Past surgical history:   has a past surgical history that includes brain surgery (); Vein Surgery;  section (); Tubal ligation (); Nerve Surgery (Left, ); Biceps tendon repair (Left, ); Ankle surgery (Right, ); Colonoscopy (2020); Endoscopy, colon, diagnostic (2020); Tonsillectomy (); and Shoulder arthroscopy (Right, 2021).  Social History:   reports that she has never smoked. She has never used smokeless tobacco. She reports current alcohol use. She reports that she does not use drugs.  Family history:   no family history of CAD, STROKE of DM    Allergies   Allergen Reactions    Nubain [Nalbuphine Hcl]      Seizures      Secobarbital Sodium Anaphylaxis     SEIZURE    Sulfa Antibiotics Anaphylaxis    Adhesive Tape Rash    Demerol      seizures    Indocin [Indomethacin]        No current facility-administered

## 2024-09-25 ENCOUNTER — TELEPHONE (OUTPATIENT)
Dept: CARDIOLOGY CLINIC | Age: 54
End: 2024-09-25

## 2024-09-25 NOTE — TELEPHONE ENCOUNTER
Test Ordered: Echo    /  Insurance: [facility]  /  Authorization Status: Pending  /  Tracking# [id number]

## 2024-09-26 NOTE — TELEPHONE ENCOUNTER
Test Ordered: Echo    /  Ins[place] [facility]  /  Authorization Status: Pending  /  Tracking# [id number]

## 2024-10-08 ENCOUNTER — HOSPITAL ENCOUNTER (OUTPATIENT)
Dept: NON INVASIVE DIAGNOSTICS | Age: 54
Discharge: HOME | End: 2024-10-10
Attending: INTERNAL MEDICINE
Payer: COMMERCIAL

## 2024-10-08 ENCOUNTER — HOSPITAL ENCOUNTER (OUTPATIENT)
Dept: NUCLEAR MEDICINE | Age: 54
Discharge: HOME | End: 2024-10-08
Attending: INTERNAL MEDICINE
Payer: COMMERCIAL

## 2024-10-08 DIAGNOSIS — R94.31 ABNORMAL EKG: ICD-10-CM

## 2024-10-08 LAB
NUC STRESS EJECTION FRACTION: 55 %
STRESS BASELINE DIAS BP: 95 MMHG
STRESS BASELINE HR: 72 BPM
STRESS BASELINE SYS BP: 182 MMHG
STRESS ESTIMATED WORKLOAD: 1 METS
STRESS PEAK DIAS BP: 88 MMHG
STRESS PEAK SYS BP: 193 MMHG
STRESS PERCENT HR ACHIEVED: 86 %
STRESS POST PEAK HR: 142 BPM
STRESS RATE PRESSURE PRODUCT: NORMAL BPM*MMHG
STRESS TARGET HR: 166 BPM

## 2024-10-08 PROCEDURE — 3430000000 HC RX DIAGNOSTIC RADIOPHARMACEUTICAL: Performed by: INTERNAL MEDICINE

## 2024-10-08 PROCEDURE — 93017 CV STRESS TEST TRACING ONLY: CPT

## 2024-10-08 PROCEDURE — A9500 TC99M SESTAMIBI: HCPCS | Performed by: INTERNAL MEDICINE

## 2024-10-08 PROCEDURE — 78452 HT MUSCLE IMAGE SPECT MULT: CPT

## 2024-10-08 PROCEDURE — 6360000002 HC RX W HCPCS: Performed by: INTERNAL MEDICINE

## 2024-10-08 RX ORDER — REGADENOSON 0.08 MG/ML
0.4 INJECTION, SOLUTION INTRAVENOUS
Status: COMPLETED | OUTPATIENT
Start: 2024-10-08 | End: 2024-10-08

## 2024-10-08 RX ORDER — REGADENOSON 0.08 MG/ML
0.4 INJECTION, SOLUTION INTRAVENOUS
Status: DISCONTINUED | OUTPATIENT
Start: 2024-10-08 | End: 2024-10-08

## 2024-10-08 RX ORDER — TETRAKIS(2-METHOXYISOBUTYLISOCYANIDE)COPPER(I) TETRAFLUOROBORATE 1 MG/ML
11 INJECTION, POWDER, LYOPHILIZED, FOR SOLUTION INTRAVENOUS
Status: COMPLETED | OUTPATIENT
Start: 2024-10-08 | End: 2024-10-08

## 2024-10-08 RX ORDER — TETRAKIS(2-METHOXYISOBUTYLISOCYANIDE)COPPER(I) TETRAFLUOROBORATE 1 MG/ML
33 INJECTION, POWDER, LYOPHILIZED, FOR SOLUTION INTRAVENOUS
Status: COMPLETED | OUTPATIENT
Start: 2024-10-08 | End: 2024-10-08

## 2024-10-08 RX ADMIN — KIT FOR THE PREPARATION OF TECHNETIUM TC99M SESTAMIBI 33 MILLICURIE: 1 INJECTION, POWDER, LYOPHILIZED, FOR SOLUTION PARENTERAL at 10:44

## 2024-10-08 RX ADMIN — REGADENOSON 0.4 MG: 0.08 INJECTION, SOLUTION INTRAVENOUS at 09:00

## 2024-10-08 RX ADMIN — KIT FOR THE PREPARATION OF TECHNETIUM TC99M SESTAMIBI 11 MILLICURIE: 1 INJECTION, POWDER, LYOPHILIZED, FOR SOLUTION PARENTERAL at 10:44

## 2024-10-10 ENCOUNTER — TELEPHONE (OUTPATIENT)
Dept: CARDIOLOGY CLINIC | Age: 54
End: 2024-10-10

## 2024-10-10 NOTE — TELEPHONE ENCOUNTER
Pt is currently hospitalized: NA    Nuclear lexiscan stress test with myocardial perfusion         Stress Combined Conclusion: Normal pharmacological myocardial perfusion study.    Perfusion Comments: LV perfusion is normal.    Perfusion Conclusion: There is no evidence of transient ischemic dilation (TID).    Image quality is good.    ECG: Resting ECG demonstrates normal sinus rhythm.    Stress Test: A pharmacological stress test was performed using regadenoson (Lexiscan). The patient reported no symptoms during the stress test.    Echo (TTE) complete         Left Ventricle: Normal left ventricular systolic function with a visually estimated EF of 55 - 60%. Left ventricle size is normal. Normal wall thickness. Normal wall motion. Normal diastolic function.    Mitral Valve: Mild regurgitation.    Tricuspid Valve: Mild regurgitation. Normal RVSP. The estimated RVSP is 32 mmHg.    Image quality is adequate.

## 2024-10-12 PROBLEM — I83.893 VARICOSE VEINS OF LOWER EXTREMITIES WITH COMPLICATIONS, BILATERAL: Status: ACTIVE | Noted: 2024-10-12

## 2024-10-23 ENCOUNTER — HOSPITAL ENCOUNTER (OUTPATIENT)
Age: 54
Discharge: HOME | End: 2024-10-23
Payer: COMMERCIAL

## 2024-10-23 DIAGNOSIS — G89.29 CHRONIC BILATERAL LOW BACK PAIN WITHOUT SCIATICA: ICD-10-CM

## 2024-10-23 DIAGNOSIS — Z79.899 HIGH RISK MEDICATION USE: ICD-10-CM

## 2024-10-23 DIAGNOSIS — M54.50 CHRONIC BILATERAL LOW BACK PAIN WITHOUT SCIATICA: ICD-10-CM

## 2024-10-23 LAB
ALBUMIN SERPL-MCNC: 4.1 G/DL (ref 3.4–5)
ALBUMIN/GLOB SERPL: 1.6 {RATIO} (ref 1.1–2.2)
ALP SERPL-CCNC: 123 U/L (ref 40–129)
ALT SERPL-CCNC: 18 U/L (ref 10–40)
ANION GAP SERPL CALCULATED.3IONS-SCNC: 11 MMOL/L (ref 9–17)
AST SERPL-CCNC: 19 U/L (ref 15–37)
BILIRUB SERPL-MCNC: 0.5 MG/DL (ref 0–1)
BUN SERPL-MCNC: 13 MG/DL (ref 7–20)
CALCIUM SERPL-MCNC: 9.1 MG/DL (ref 8.3–10.6)
CHLORIDE SERPL-SCNC: 102 MMOL/L (ref 99–110)
CO2 SERPL-SCNC: 25 MMOL/L (ref 21–32)
CREAT SERPL-MCNC: 0.9 MG/DL (ref 0.6–1.1)
ERYTHROCYTE [DISTWIDTH] IN BLOOD BY AUTOMATED COUNT: 14.6 % (ref 11.7–14.9)
GFR, ESTIMATED: 69 ML/MIN/1.73M2
GLUCOSE SERPL-MCNC: 222 MG/DL (ref 74–99)
HCT VFR BLD AUTO: 43.2 % (ref 37–47)
HGB BLD-MCNC: 13.2 G/DL (ref 12.5–16)
MCH RBC QN AUTO: 27 PG (ref 27–31)
MCHC RBC AUTO-ENTMCNC: 30.6 G/DL (ref 32–36)
MCV RBC AUTO: 88.3 FL (ref 78–100)
PLATELET # BLD AUTO: 369 K/UL (ref 140–440)
PMV BLD AUTO: 9.9 FL (ref 7.5–11.1)
POTASSIUM SERPL-SCNC: 3.8 MMOL/L (ref 3.5–5.1)
PROT SERPL-MCNC: 6.6 G/DL (ref 6.4–8.2)
RBC # BLD AUTO: 4.89 M/UL (ref 4.2–5.4)
SODIUM SERPL-SCNC: 138 MMOL/L (ref 136–145)
WBC OTHER # BLD: 10.4 K/UL (ref 4–10.5)

## 2024-10-23 PROCEDURE — 80053 COMPREHEN METABOLIC PANEL: CPT

## 2024-10-23 PROCEDURE — 36415 COLL VENOUS BLD VENIPUNCTURE: CPT

## 2024-10-23 PROCEDURE — 86812 HLA TYPING A B OR C: CPT

## 2024-10-23 PROCEDURE — 85027 COMPLETE CBC AUTOMATED: CPT

## 2024-10-26 LAB — HLA B27: NEGATIVE
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
## 2024-11-06 ENCOUNTER — OFFICE VISIT (OUTPATIENT)
Dept: RHEUMATOLOGY | Age: 54
End: 2024-11-06
Payer: COMMERCIAL

## 2024-11-06 VITALS
BODY MASS INDEX: 37.66 KG/M2 | DIASTOLIC BLOOD PRESSURE: 90 MMHG | WEIGHT: 219.4 LBS | HEART RATE: 77 BPM | SYSTOLIC BLOOD PRESSURE: 140 MMHG | OXYGEN SATURATION: 98 %

## 2024-11-06 DIAGNOSIS — M25.50 POLYARTHRALGIA: ICD-10-CM

## 2024-11-06 DIAGNOSIS — M06.09 RHEUMATOID ARTHRITIS OF MULTIPLE SITES WITH NEGATIVE RHEUMATOID FACTOR (HCC): Primary | ICD-10-CM

## 2024-11-06 DIAGNOSIS — M54.50 CHRONIC BILATERAL LOW BACK PAIN WITHOUT SCIATICA: ICD-10-CM

## 2024-11-06 DIAGNOSIS — M79.7 FIBROMYALGIA: ICD-10-CM

## 2024-11-06 DIAGNOSIS — G89.29 CHRONIC BILATERAL LOW BACK PAIN WITHOUT SCIATICA: ICD-10-CM

## 2024-11-06 DIAGNOSIS — Z79.899 HIGH RISK MEDICATION USE: ICD-10-CM

## 2024-11-06 PROCEDURE — 1036F TOBACCO NON-USER: CPT | Performed by: STUDENT IN AN ORGANIZED HEALTH CARE EDUCATION/TRAINING PROGRAM

## 2024-11-06 PROCEDURE — G8417 CALC BMI ABV UP PARAM F/U: HCPCS | Performed by: STUDENT IN AN ORGANIZED HEALTH CARE EDUCATION/TRAINING PROGRAM

## 2024-11-06 PROCEDURE — 3017F COLORECTAL CA SCREEN DOC REV: CPT | Performed by: STUDENT IN AN ORGANIZED HEALTH CARE EDUCATION/TRAINING PROGRAM

## 2024-11-06 PROCEDURE — G8484 FLU IMMUNIZE NO ADMIN: HCPCS | Performed by: STUDENT IN AN ORGANIZED HEALTH CARE EDUCATION/TRAINING PROGRAM

## 2024-11-06 PROCEDURE — G8427 DOCREV CUR MEDS BY ELIG CLIN: HCPCS | Performed by: STUDENT IN AN ORGANIZED HEALTH CARE EDUCATION/TRAINING PROGRAM

## 2024-11-06 PROCEDURE — 99215 OFFICE O/P EST HI 40 MIN: CPT | Performed by: STUDENT IN AN ORGANIZED HEALTH CARE EDUCATION/TRAINING PROGRAM

## 2024-11-06 RX ORDER — ADALIMUMAB 40MG/0.4ML
40 KIT SUBCUTANEOUS
Qty: 2 EACH | Refills: 2 | Status: ACTIVE | OUTPATIENT
Start: 2024-11-06

## 2024-11-06 RX ORDER — TIRZEPATIDE 7.5 MG/.5ML
INJECTION, SOLUTION SUBCUTANEOUS
COMMUNITY
Start: 2024-10-27

## 2024-11-06 RX ORDER — METHOTREXATE 2.5 MG/1
20 TABLET ORAL WEEKLY
Qty: 32 TABLET | Refills: 2 | Status: SHIPPED | OUTPATIENT
Start: 2024-11-06

## 2024-11-06 RX ORDER — FOLIC ACID 1 MG/1
1 TABLET ORAL DAILY
Qty: 90 TABLET | Refills: 0 | Status: SHIPPED | OUTPATIENT
Start: 2024-11-06

## 2024-11-06 RX ORDER — METHYLPREDNISOLONE 4 MG/1
TABLET ORAL
Qty: 21 TABLET | Refills: 0 | Status: SHIPPED | OUTPATIENT
Start: 2024-11-06 | End: 2024-11-12

## 2024-11-06 NOTE — PATIENT INSTRUCTIONS
Patient Instructions  Start medrol dose pack  Continue methotrexate 8 pills every 7 days  Continue folic acid 1 mg daily  Continue Humira 40mg every 2 weeks   Stop Cymbalta since it is not helping   Get labs one week prior to next visit  RTC in 3 months

## 2024-12-17 ENCOUNTER — TELEPHONE (OUTPATIENT)
Dept: CARDIOLOGY CLINIC | Age: 54
End: 2024-12-17

## 2024-12-17 NOTE — TELEPHONE ENCOUNTER
Pt was going to make a appt but she just found out her  is having hospice called in so she will call back to get her an appt

## 2024-12-20 PROBLEM — I80.02 SUPERFICIAL THROMBOPHLEBITIS OF LEFT LEG: Status: ACTIVE | Noted: 2024-12-20

## 2025-01-18 PROBLEM — M79.605 CHRONIC PAIN OF LEFT LOWER EXTREMITY: Status: ACTIVE | Noted: 2025-01-18

## 2025-01-18 PROBLEM — I80.02 THROMBOPHLEBITIS OF SUPERFICIAL VEINS OF LEFT LOWER EXTREMITY: Status: ACTIVE | Noted: 2025-01-18

## 2025-01-18 PROBLEM — G89.29 CHRONIC PAIN OF LEFT LOWER EXTREMITY: Status: ACTIVE | Noted: 2025-01-18

## 2025-01-29 ENCOUNTER — HOSPITAL ENCOUNTER (OUTPATIENT)
Age: 55
Discharge: HOME | End: 2025-01-29
Payer: COMMERCIAL

## 2025-01-29 DIAGNOSIS — Z79.899 HIGH RISK MEDICATION USE: ICD-10-CM

## 2025-01-29 DIAGNOSIS — M06.09 RHEUMATOID ARTHRITIS OF MULTIPLE SITES WITH NEGATIVE RHEUMATOID FACTOR (HCC): ICD-10-CM

## 2025-01-29 LAB
ALBUMIN SERPL-MCNC: 3.7 G/DL (ref 3.4–5)
ALBUMIN/GLOB SERPL: 1.4 {RATIO} (ref 1.1–2.2)
ALP SERPL-CCNC: 119 U/L (ref 40–129)
ALT SERPL-CCNC: 16 U/L (ref 10–40)
ANION GAP SERPL CALCULATED.3IONS-SCNC: 10 MMOL/L (ref 9–17)
AST SERPL-CCNC: 18 U/L (ref 15–37)
BILIRUB SERPL-MCNC: 0.3 MG/DL (ref 0–1)
BUN SERPL-MCNC: 11 MG/DL (ref 7–20)
CALCIUM SERPL-MCNC: 8.6 MG/DL (ref 8.3–10.6)
CHLORIDE SERPL-SCNC: 105 MMOL/L (ref 99–110)
CO2 SERPL-SCNC: 25 MMOL/L (ref 21–32)
CREAT SERPL-MCNC: 0.6 MG/DL (ref 0.6–1.1)
CRP SERPL HS-MCNC: 9.8 MG/L (ref 0–5)
ERYTHROCYTE [DISTWIDTH] IN BLOOD BY AUTOMATED COUNT: 13.6 % (ref 11.7–14.9)
GFR, ESTIMATED: >90 ML/MIN/1.73M2
GLUCOSE SERPL-MCNC: 205 MG/DL (ref 74–99)
HCT VFR BLD AUTO: 40.7 % (ref 37–47)
HGB BLD-MCNC: 12.8 G/DL (ref 12.5–16)
MCH RBC QN AUTO: 26.6 PG (ref 27–31)
MCHC RBC AUTO-ENTMCNC: 31.4 G/DL (ref 32–36)
MCV RBC AUTO: 84.6 FL (ref 78–100)
PLATELET # BLD AUTO: 368 K/UL (ref 140–440)
PMV BLD AUTO: 9.5 FL (ref 7.5–11.1)
POTASSIUM SERPL-SCNC: 3.9 MMOL/L (ref 3.5–5.1)
PROT SERPL-MCNC: 6.3 G/DL (ref 6.4–8.2)
RBC # BLD AUTO: 4.81 M/UL (ref 4.2–5.4)
SODIUM SERPL-SCNC: 139 MMOL/L (ref 136–145)
WBC OTHER # BLD: 8.6 K/UL (ref 4–10.5)

## 2025-01-29 PROCEDURE — 85027 COMPLETE CBC AUTOMATED: CPT

## 2025-01-29 PROCEDURE — 80053 COMPREHEN METABOLIC PANEL: CPT

## 2025-01-29 PROCEDURE — 86140 C-REACTIVE PROTEIN: CPT
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
## 2025-03-11 ENCOUNTER — OFFICE VISIT (OUTPATIENT)
Age: 55
End: 2025-03-11
Payer: COMMERCIAL

## 2025-03-11 VITALS
HEART RATE: 75 BPM | DIASTOLIC BLOOD PRESSURE: 88 MMHG | SYSTOLIC BLOOD PRESSURE: 142 MMHG | HEIGHT: 64 IN | RESPIRATION RATE: 18 BRPM | OXYGEN SATURATION: 100 % | WEIGHT: 225.4 LBS | BODY MASS INDEX: 38.48 KG/M2

## 2025-03-11 DIAGNOSIS — M25.50 POLYARTHRALGIA: ICD-10-CM

## 2025-03-11 DIAGNOSIS — M06.09 RHEUMATOID ARTHRITIS OF MULTIPLE SITES WITH NEGATIVE RHEUMATOID FACTOR (HCC): Primary | ICD-10-CM

## 2025-03-11 DIAGNOSIS — M79.7 FIBROMYALGIA: ICD-10-CM

## 2025-03-11 DIAGNOSIS — Z79.899 HIGH RISK MEDICATION USE: ICD-10-CM

## 2025-03-11 PROCEDURE — 99214 OFFICE O/P EST MOD 30 MIN: CPT | Performed by: STUDENT IN AN ORGANIZED HEALTH CARE EDUCATION/TRAINING PROGRAM

## 2025-03-11 PROCEDURE — G8427 DOCREV CUR MEDS BY ELIG CLIN: HCPCS | Performed by: STUDENT IN AN ORGANIZED HEALTH CARE EDUCATION/TRAINING PROGRAM

## 2025-03-11 PROCEDURE — 99215 OFFICE O/P EST HI 40 MIN: CPT | Performed by: STUDENT IN AN ORGANIZED HEALTH CARE EDUCATION/TRAINING PROGRAM

## 2025-03-11 PROCEDURE — 3017F COLORECTAL CA SCREEN DOC REV: CPT | Performed by: STUDENT IN AN ORGANIZED HEALTH CARE EDUCATION/TRAINING PROGRAM

## 2025-03-11 PROCEDURE — 1036F TOBACCO NON-USER: CPT | Performed by: STUDENT IN AN ORGANIZED HEALTH CARE EDUCATION/TRAINING PROGRAM

## 2025-03-11 PROCEDURE — G8417 CALC BMI ABV UP PARAM F/U: HCPCS | Performed by: STUDENT IN AN ORGANIZED HEALTH CARE EDUCATION/TRAINING PROGRAM

## 2025-03-11 RX ORDER — METHOTREXATE 2.5 MG/1
20 TABLET ORAL WEEKLY
Qty: 32 TABLET | Refills: 2 | Status: SHIPPED | OUTPATIENT
Start: 2025-03-11

## 2025-03-11 RX ORDER — METHYLPREDNISOLONE 4 MG/1
TABLET ORAL
Qty: 21 TABLET | Refills: 0 | Status: SHIPPED | OUTPATIENT
Start: 2025-03-11 | End: 2025-03-17

## 2025-03-11 RX ORDER — MEDROXYPROGESTERONE ACETATE 150 MG/ML
50 INJECTION, SUSPENSION INTRAMUSCULAR
Qty: 4 ML | Refills: 2 | Status: SHIPPED | OUTPATIENT
Start: 2025-03-11

## 2025-03-11 RX ORDER — FOLIC ACID 1 MG/1
1 TABLET ORAL DAILY
Qty: 90 TABLET | Refills: 0 | Status: SHIPPED | OUTPATIENT
Start: 2025-03-11

## 2025-03-14 DIAGNOSIS — M06.09 RHEUMATOID ARTHRITIS OF MULTIPLE SITES WITH NEGATIVE RHEUMATOID FACTOR (HCC): ICD-10-CM

## 2025-03-14 DIAGNOSIS — M25.50 POLYARTHRALGIA: ICD-10-CM

## 2025-03-18 ENCOUNTER — TELEPHONE (OUTPATIENT)
Age: 55
End: 2025-03-18

## 2025-03-18 NOTE — TELEPHONE ENCOUNTER
----- Message from Dr. Suhas Sweet MD sent at 3/14/2025  8:06 AM EDT -----  Regarding: RE: TB  Notify patient that CMP and hepatitis test has been ordered by insurance requirements for PA to be completed for Enbrel  ----- Message -----  From: Ethan Reich MA  Sent: 3/13/2025   9:52 AM EDT  To: Suhas Sweet MD  Subject: FW: TB                                             ----- Message -----  From: Jhon Sullivan  Sent: 3/13/2025   9:24 AM EDT  To: [facility] Clinical Staff  Subject: TB                                               Hey there,     Initial PA denied for patient due to : \"coverage is provided when the member meets ALL the following requirements: Must provide evidence of a negative TB test within the last 12 months (test used to detect TB bacteria in the body) prior to initiation of biologic therapy, if required by labeling.\"     Can we schedule a new TB test for this patient?     Let me know - HERBERT CODY

## 2025-04-02 ENCOUNTER — TELEPHONE (OUTPATIENT)
Age: 55
End: 2025-04-02

## 2025-04-02 NOTE — TELEPHONE ENCOUNTER
Patient called to inform office that insurance denied medication due to no recent Tb test. Patient was informed that Tb has been ordered. Patient states she has the flu and will go have labs done once she is feeling better.

## 2025-04-21 DIAGNOSIS — M06.09 RHEUMATOID ARTHRITIS OF MULTIPLE SITES WITH NEGATIVE RHEUMATOID FACTOR (HCC): ICD-10-CM

## 2025-04-21 RX ORDER — FOLIC ACID 1 MG/1
1000 TABLET ORAL DAILY
Qty: 90 TABLET | Refills: 0 | Status: SHIPPED | OUTPATIENT
Start: 2025-04-21

## 2025-05-06 ENCOUNTER — HOSPITAL ENCOUNTER (OUTPATIENT)
Age: 55
Discharge: HOME | End: 2025-05-06
Payer: COMMERCIAL

## 2025-05-06 DIAGNOSIS — Z79.899 HIGH RISK MEDICATION USE: ICD-10-CM

## 2025-05-06 DIAGNOSIS — M06.09 RHEUMATOID ARTHRITIS OF MULTIPLE SITES WITH NEGATIVE RHEUMATOID FACTOR (HCC): ICD-10-CM

## 2025-05-06 LAB
ALBUMIN SERPL-MCNC: 4 G/DL (ref 3.4–5)
ALBUMIN/GLOB SERPL: 1.2 {RATIO} (ref 1.1–2.2)
ALP SERPL-CCNC: 120 U/L (ref 40–129)
ALT SERPL-CCNC: 17 U/L (ref 10–40)
ANION GAP SERPL CALCULATED.3IONS-SCNC: 11 MMOL/L (ref 9–17)
AST SERPL-CCNC: 19 U/L (ref 15–37)
BILIRUB SERPL-MCNC: 0.6 MG/DL (ref 0–1)
BUN SERPL-MCNC: 14 MG/DL (ref 7–20)
CALCIUM SERPL-MCNC: 10 MG/DL (ref 8.3–10.6)
CHLORIDE SERPL-SCNC: 100 MMOL/L (ref 99–110)
CO2 SERPL-SCNC: 26 MMOL/L (ref 21–32)
CREAT SERPL-MCNC: 0.9 MG/DL (ref 0.6–1.1)
CRP SERPL HS-MCNC: 4.6 MG/L (ref 0–5)
ERYTHROCYTE [DISTWIDTH] IN BLOOD BY AUTOMATED COUNT: 14.8 % (ref 11.7–14.9)
GFR, ESTIMATED: 62 ML/MIN/1.73M2
GLUCOSE SERPL-MCNC: 85 MG/DL (ref 74–99)
HAV IGM SERPL QL IA: NONREACTIVE
HBV CORE IGM SERPL QL IA: NONREACTIVE
HBV SURFACE AG SERPL QL IA: NONREACTIVE
HCT VFR BLD AUTO: 48.5 % (ref 37–47)
HCV AB SERPL QL IA: NONREACTIVE
HGB BLD-MCNC: 14.9 G/DL (ref 12.5–16)
MCH RBC QN AUTO: 27.8 PG (ref 27–31)
MCHC RBC AUTO-ENTMCNC: 30.7 G/DL (ref 32–36)
MCV RBC AUTO: 90.5 FL (ref 78–100)
PLATELET # BLD AUTO: 412 K/UL (ref 140–440)
PMV BLD AUTO: 9.5 FL (ref 7.5–11.1)
POTASSIUM SERPL-SCNC: 3.6 MMOL/L (ref 3.5–5.1)
PROT SERPL-MCNC: 7.3 G/DL (ref 6.4–8.2)
RBC # BLD AUTO: 5.36 M/UL (ref 4.2–5.4)
SODIUM SERPL-SCNC: 137 MMOL/L (ref 136–145)
WBC OTHER # BLD: 11.8 K/UL (ref 4–10.5)

## 2025-05-06 PROCEDURE — 36415 COLL VENOUS BLD VENIPUNCTURE: CPT

## 2025-05-06 PROCEDURE — 86140 C-REACTIVE PROTEIN: CPT

## 2025-05-06 PROCEDURE — 86480 TB TEST CELL IMMUN MEASURE: CPT

## 2025-05-06 PROCEDURE — 85027 COMPLETE CBC AUTOMATED: CPT

## 2025-05-06 PROCEDURE — 80053 COMPREHEN METABOLIC PANEL: CPT

## 2025-05-06 PROCEDURE — 80074 ACUTE HEPATITIS PANEL: CPT
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
## 2025-05-14 ENCOUNTER — OFFICE VISIT (OUTPATIENT)
Age: 55
End: 2025-05-14
Payer: COMMERCIAL

## 2025-05-14 VITALS
WEIGHT: 243 LBS | OXYGEN SATURATION: 98 % | SYSTOLIC BLOOD PRESSURE: 156 MMHG | DIASTOLIC BLOOD PRESSURE: 88 MMHG | BODY MASS INDEX: 41.68 KG/M2 | HEART RATE: 65 BPM

## 2025-05-14 DIAGNOSIS — M79.7 FIBROMYALGIA: ICD-10-CM

## 2025-05-14 DIAGNOSIS — M25.50 POLYARTHRALGIA: ICD-10-CM

## 2025-05-14 DIAGNOSIS — Z79.899 HIGH RISK MEDICATION USE: ICD-10-CM

## 2025-05-14 DIAGNOSIS — M06.09 RHEUMATOID ARTHRITIS OF MULTIPLE SITES WITH NEGATIVE RHEUMATOID FACTOR (HCC): Primary | ICD-10-CM

## 2025-05-14 PROCEDURE — G8417 CALC BMI ABV UP PARAM F/U: HCPCS | Performed by: STUDENT IN AN ORGANIZED HEALTH CARE EDUCATION/TRAINING PROGRAM

## 2025-05-14 PROCEDURE — G8427 DOCREV CUR MEDS BY ELIG CLIN: HCPCS | Performed by: STUDENT IN AN ORGANIZED HEALTH CARE EDUCATION/TRAINING PROGRAM

## 2025-05-14 PROCEDURE — 99214 OFFICE O/P EST MOD 30 MIN: CPT | Performed by: STUDENT IN AN ORGANIZED HEALTH CARE EDUCATION/TRAINING PROGRAM

## 2025-05-14 PROCEDURE — 99213 OFFICE O/P EST LOW 20 MIN: CPT | Performed by: STUDENT IN AN ORGANIZED HEALTH CARE EDUCATION/TRAINING PROGRAM

## 2025-05-14 PROCEDURE — 3017F COLORECTAL CA SCREEN DOC REV: CPT | Performed by: STUDENT IN AN ORGANIZED HEALTH CARE EDUCATION/TRAINING PROGRAM

## 2025-05-14 PROCEDURE — 1036F TOBACCO NON-USER: CPT | Performed by: STUDENT IN AN ORGANIZED HEALTH CARE EDUCATION/TRAINING PROGRAM

## 2025-05-14 RX ORDER — MEDROXYPROGESTERONE ACETATE 150 MG/ML
50 INJECTION, SUSPENSION INTRAMUSCULAR
Qty: 4 ML | Refills: 2 | Status: SHIPPED
Start: 2025-05-14

## 2025-05-14 NOTE — PATIENT INSTRUCTIONS
Patient Instructions  Continue methotrexate 8 pills every 7 days  Continue folic acid 1 mg daily  Stop Humira   Start Enbrel 1 week after last Humira injection  Vitamin D daily  Get labs one week prior to next visit  RTC in 3 months

## 2025-06-06 ENCOUNTER — HOSPITAL ENCOUNTER (OUTPATIENT)
Age: 55
Setting detail: SPECIMEN
Discharge: HOME | End: 2025-06-06

## 2025-06-13 LAB
QUANTI TB1 MINUS NIL: 0.02 IU/ML
QUANTI TB2 MINUS NIL: 0.01 IU/ML
QUANTIFERON MITOGEN MINUS NIL: 9.92 IU/ML
QUANTIFERON NIL: 0.08 IU/ML
QUANTIFERON TB INTERPRETATION: NEGATIVE IU/ML

## 2025-06-20 ENCOUNTER — TELEPHONE (OUTPATIENT)
Age: 55
End: 2025-06-20

## 2025-06-20 DIAGNOSIS — M06.09 RHEUMATOID ARTHRITIS OF MULTIPLE SITES WITH NEGATIVE RHEUMATOID FACTOR (HCC): ICD-10-CM

## 2025-06-20 RX ORDER — MEDROXYPROGESTERONE ACETATE 150 MG/ML
50 INJECTION, SUSPENSION INTRAMUSCULAR
Qty: 4 ML | Refills: 2 | Status: ACTIVE | OUTPATIENT
Start: 2025-06-20

## 2025-06-20 NOTE — TELEPHONE ENCOUNTER
Per Jhon Sullivan    \"can you please send me a new enbrel script for [id number]     I routed it to harness with her on the phone but it did not populate\"    Please advise.

## 2025-06-26 DIAGNOSIS — M06.09 RHEUMATOID ARTHRITIS OF MULTIPLE SITES WITH NEGATIVE RHEUMATOID FACTOR (HCC): ICD-10-CM

## 2025-06-26 DIAGNOSIS — Z79.899 HIGH RISK MEDICATION USE: ICD-10-CM

## 2025-06-26 RX ORDER — CONTAINER,EMPTY
1 EACH MISCELLANEOUS PRN
Qty: 1 EACH | Refills: 3 | Status: CANCELLED | OUTPATIENT
Start: 2025-06-26

## 2025-06-27 DIAGNOSIS — Z79.899 HIGH RISK MEDICATION USE: ICD-10-CM

## 2025-06-27 DIAGNOSIS — M06.09 RHEUMATOID ARTHRITIS OF MULTIPLE SITES WITH NEGATIVE RHEUMATOID FACTOR (HCC): ICD-10-CM

## 2025-06-27 RX ORDER — CONTAINER,EMPTY
1 EACH MISCELLANEOUS PRN
Qty: 1 EACH | Refills: 3 | Status: SHIPPED | OUTPATIENT
Start: 2025-06-27

## 2025-06-27 RX ORDER — UBIQUINOL 100 MG
CAPSULE ORAL
Qty: 100 EACH | Refills: 1 | Status: SHIPPED | OUTPATIENT
Start: 2025-06-27

## 2025-07-01 DIAGNOSIS — M06.09 RHEUMATOID ARTHRITIS OF MULTIPLE SITES WITH NEGATIVE RHEUMATOID FACTOR (HCC): ICD-10-CM

## 2025-07-01 DIAGNOSIS — Z79.899 HIGH RISK MEDICATION USE: ICD-10-CM

## 2025-07-01 RX ORDER — UBIQUINOL 100 MG
CAPSULE ORAL
Qty: 100 EACH | Refills: 1 | Status: SHIPPED | OUTPATIENT
Start: 2025-07-01

## 2025-07-08 ENCOUNTER — OFFICE VISIT (OUTPATIENT)
Age: 55
End: 2025-07-08

## 2025-07-08 ENCOUNTER — HOSPITAL ENCOUNTER (OUTPATIENT)
Age: 55
Setting detail: SPECIMEN
Discharge: HOME | End: 2025-07-08
Payer: COMMERCIAL

## 2025-07-08 VITALS
WEIGHT: 221 LBS | SYSTOLIC BLOOD PRESSURE: 140 MMHG | DIASTOLIC BLOOD PRESSURE: 70 MMHG | BODY MASS INDEX: 37.73 KG/M2 | HEIGHT: 64 IN

## 2025-07-08 DIAGNOSIS — Z12.31 SCREENING MAMMOGRAM FOR BREAST CANCER: ICD-10-CM

## 2025-07-08 DIAGNOSIS — N95.0 POST-MENOPAUSAL BLEEDING: Primary | ICD-10-CM

## 2025-07-08 DIAGNOSIS — Z01.419 ENCOUNTER FOR ANNUAL ROUTINE GYNECOLOGICAL EXAMINATION: ICD-10-CM

## 2025-07-08 DIAGNOSIS — N32.81 DETRUSOR INSTABILITY OF BLADDER: ICD-10-CM

## 2025-07-08 DIAGNOSIS — A60.9 HSV (HERPES SIMPLEX VIRUS) ANOGENITAL INFECTION: ICD-10-CM

## 2025-07-08 PROCEDURE — G0123 SCREEN CERV/VAG THIN LAYER: HCPCS

## 2025-07-08 PROCEDURE — 87624 HPV HI-RISK TYP POOLED RSLT: CPT

## 2025-07-08 RX ORDER — ACYCLOVIR 400 MG/1
400 TABLET ORAL DAILY
Qty: 30 TABLET | Refills: 11 | Status: SHIPPED | OUTPATIENT
Start: 2025-07-08

## 2025-07-08 RX ORDER — OXYBUTYNIN CHLORIDE 5 MG/1
5 TABLET, EXTENDED RELEASE ORAL 2 TIMES DAILY
Qty: 30 TABLET | Refills: 11 | Status: SHIPPED | OUTPATIENT
Start: 2025-07-08

## 2025-07-08 ASSESSMENT — ENCOUNTER SYMPTOMS
ABDOMINAL PAIN: 0
SHORTNESS OF BREATH: 0

## 2025-07-08 NOTE — PROGRESS NOTES
Behavior: Behavior is cooperative.         Assessment and Plan  1. Post-menopausal bleeding  -     PAP SMEAR  -     US OB TRANSVAGINAL; Future  2. Screening mammogram for breast cancer  -     [facility] ARNALDO DIGITAL SCREEN BILATERAL; Future  3. Encounter for annual routine gynecological examination  4. HSV (herpes simplex virus) anogenital infection  -     acyclovir (ZOVIRAX) 400 MG tablet; Take 1 tablet by mouth daily, Disp-30 tablet, R-11Normal  5. Detrusor instability of bladder  -     oxyBUTYnin (DITROPAN-XL) 5 MG extended release tablet; Take 1 tablet by mouth 2 times daily, Disp-30 tablet, R-11Normal     So to workup her postmenopausal bleeding were going to send her for a transvaginal ultrasound in a couple of weeks I will see her back we discussed briefly endometrial Pipelle sampling versus D&C and we will see how thick or irregular her lining looks.  Return in about 2 weeks (around 7/22/2025) for transvaginal US.    Sylvester Pereyra MD

## 2025-07-10 LAB
COMMENT: NORMAL
HPV OTHER HR TYPES: NORMAL
HPV TYPE 16: NORMAL
HPV TYPE 18: NORMAL

## 2025-07-14 ENCOUNTER — TELEPHONE (OUTPATIENT)
Age: 55
End: 2025-07-14

## 2025-07-15 ENCOUNTER — HOSPITAL ENCOUNTER (OUTPATIENT)
Dept: PHYSICAL THERAPY | Age: 55
Setting detail: THERAPIES SERIES
Discharge: HOME | End: 2025-07-15
Payer: COMMERCIAL

## 2025-07-15 PROCEDURE — 97161 PT EVAL LOW COMPLEX 20 MIN: CPT

## 2025-07-15 PROCEDURE — 97140 MANUAL THERAPY 1/> REGIONS: CPT

## 2025-07-15 SDOH — ECONOMIC STABILITY: FOOD INSECURITY: WITHIN THE PAST 12 MONTHS, YOU WORRIED THAT YOUR FOOD WOULD RUN OUT BEFORE YOU GOT MONEY TO BUY MORE.: PATIENT DECLINED

## 2025-07-15 SDOH — ECONOMIC STABILITY: FOOD INSECURITY: WITHIN THE PAST 12 MONTHS, THE FOOD YOU BOUGHT JUST DIDN'T LAST AND YOU DIDN'T HAVE MONEY TO GET MORE.: PATIENT DECLINED

## 2025-07-15 SDOH — ECONOMIC STABILITY: TRANSPORTATION INSECURITY
IN THE PAST 12 MONTHS, HAS LACK OF TRANSPORTATION KEPT YOU FROM MEETINGS, WORK, OR FROM GETTING THINGS NEEDED FOR DAILY LIVING?: PATIENT DECLINED

## 2025-07-15 SDOH — ECONOMIC STABILITY: INCOME INSECURITY: IN THE LAST 12 MONTHS, WAS THERE A TIME WHEN YOU WERE NOT ABLE TO PAY THE MORTGAGE OR RENT ON TIME?: PATIENT DECLINED

## 2025-07-15 SDOH — ECONOMIC STABILITY: TRANSPORTATION INSECURITY
IN THE PAST 12 MONTHS, HAS THE LACK OF TRANSPORTATION KEPT YOU FROM MEDICAL APPOINTMENTS OR FROM GETTING MEDICATIONS?: PATIENT DECLINED

## 2025-07-15 ASSESSMENT — PAIN SCALES - GENERAL: PAINLEVEL_OUTOF10: 6

## 2025-07-15 NOTE — PROGRESS NOTES
Physical Therapy: Initial Evaluation    Patient: Malorie Green (55 y.o. female)   Examination Date: 07/15/2025         :  1970 ;    Confirmed: Yes MRN: [id number]  CSN: [id number]   Insurance: Payor: [facility] / Plan: [facility] MEDICAID / Product Type: *No Product type* /   Insurance ID: [id number] - (Medicaid Managed) Secondary Insurance (if applicable):    Referring Physician: Cornell Rios DPM Dr. Libianco   PCP: Oswaldo Sharp MD Visits to Date/Visits Approved:   /      No Show/Cancelled Appts:   /       Medical Diagnosis: Foot pain [M79.673] s/p peroneal tendon repair  Treatment Diagnosis: L foot/ankle pain, stiffness.     PERTINENT MEDICAL HISTORY           Medical History:     Past Medical History:   Diagnosis Date    Anemia     Arthritis     Neck    Asthma     PCP - last episode:     Autoimmune disorder     Rheumatoid arthritis    Deep vein thrombosis (HCC)     Diabetes mellitus (HCC)     Type II - follows with PCP    Drug effect     Demerol, secanol, indo in, nubain and sulfa drugs    Ehler's-Danlos syndrome Dx: 2000    PCP    Fibromyalgia     Herpes simplex virus (HSV) infection     Hx of blood clots     Left lower leg - unsure of treatment    Hypertension     Middle cerebral aneurysm     surgery @ [place]    Migraine     Mitral valve regurgitation     Originally saw Dr. Hernandez, now follows with PCP (updated 2021)    Neurologic disorder     Have not had one so ce i was 12    Overactive bladder     PMB (postmenopausal bleeding)     Raynauds syndrome     Seizures (HCC) 1972    Havent had sonce i was 12    Short-term memory loss     due to aneurysm per pt    Type 2 diabetes mellitus without complication (HCC)     Varicosities      Surgical History:   Past Surgical History:   Procedure Laterality Date    ANKLE SURGERY Right 2010    surgery x2    BICEPS TENDON REPAIR Left 1998    in left 5th digit    BRAIN SURGERY

## 2025-07-15 NOTE — FLOWSHEET NOTE
Outpatient Physical Therapy  [place]           [x] Phone: [phone]   Fax: [phone]  [place]           [] Phone: [phone]   Fax: [phone]        Physical Therapy Daily Treatment Note  Date:  7/15/2025    Patient Name:  Malorie Green    :  1970  MRN: [id number]  Restrictions/Precautions: No data recorded      Diagnosis:   Foot pain [M79.673] Diagnosis: s/p peroneal tendon repair  Date of Injury/Surgery:   Treatment Diagnosis:  L foot/ankle pain, stiffness.  Insurance/Certification information: [facility]  Referring Physician:  Cornell Rios DPM Dr. Libianco   PCP: Oswaldo Sharp MD  Next Doctor Visit:    Plan of care signed (Y/N):  n  Outcome Measure: LEFS   Visit# / total visits:  1 /   up to 30pcy  Pain level: 610   Goals:     Patient goals: decrease pain, improve standing, walking     Long Term Goals  Time Frame for Long Term Goals : 8 weeks  Long Term Goal 1: I in home program.  Long Term Goal 2: stand/walk 1 hr with minimal pain.  Long Term Goal 3: up/down steps with minimal pain with reciprocal pattern  Long Term Goal 4: wear normal footwear full day with minimal pain.      Summary of Evaluation:  Assessment: Pt presents to PT following L peroneal tendon repair 25. She appears to be healing and recovering well overall.  She wears her boot much of the time, especially later in the day as pain increases.  She is unable to stand/walk bear full weight without footwear.  Ankle motion and strength are limited, stiffness and tightnss presen at foot and ankle.  Edema is mild, incisions are closed and healing. some scabbing still present on medial incision, no signs of infection or drainage.        Subjective:  See eval         Any changes in Ambulatory Summary Sheet?  None        Objective:  See eval           Exercises: (No more than 4 columns)   Exercise/Equipment Date 7/15/25 #1 Date Date           WARM UP         Nu step or bike            TABLE      Ankle ROM

## 2025-07-15 NOTE — PLAN OF CARE
Outpatient Physical Therapy                  [] Phone: [phone]   Fax: [phone]    Pediatric Therapy                                    [] Phone: [phone]   Fax: [phone]  [place]                                       [x] Phone: [phone]   Fax: [phone]      To: Cornell Rios DPM Dr. Libianco   From: Porter Garcia, PT,      Patient: Malorie Green       : 1970  Diagnosis: s/p peroneal tendon repair   Treatment Diagnosis: L foot/ankle pain, stiffness.   Date: 7/15/2025    Physical Therapy Certification/Re-Certification Form  Dear Dr. De Jesus   The following patient has been evaluated for physical therapy services and for therapy to continue, Please review the attached evaluation and/or summary of the patient's plan of care, and verify that you agree therapy should continue by signing the attached document and sending it back to our office.  Patient is a  54 yo female who presents with L foot and ankle pain which impacts on adls;patient's goal is to recover from surgery and resume her normal activities ;patient reports that  pain and surgical healing  limits activities including standing, walking, wearing shoes, going without shoes, stairs, working; PT to address patient's goals, impairments and activity limitations with skilled interventions checked in plan of care;patient's level of function prior to onset of  symptoms was independent; did not observe any barriers to learning during PT eval; learning preferences include demonstration, practice, and handouts; patient expressed understanding of HEP; patient appears to be motivated to participate in an active PT program and to be compliant with HEP expectations;patient assisted in developing treatment plan and goals; no DME is currently being used;        Pt is in agreement with the treatment plan and goals.  Pt treatment will include multiple approaches to maximize benefit, including demonstration, verbal and tactile cueing,

## 2025-07-18 ENCOUNTER — OFFICE VISIT (OUTPATIENT)
Age: 55
End: 2025-07-18

## 2025-07-18 ENCOUNTER — HOSPITAL ENCOUNTER (OUTPATIENT)
Age: 55
Setting detail: SPECIMEN
Discharge: HOME | End: 2025-07-18

## 2025-07-18 VITALS — BODY MASS INDEX: 36.65 KG/M2 | HEIGHT: 65 IN | WEIGHT: 220 LBS

## 2025-07-18 DIAGNOSIS — N95.0 POST-MENOPAUSAL BLEEDING: Primary | ICD-10-CM

## 2025-07-18 LAB
COMMENT: NORMAL
HPV OTHER HR TYPES: NOT DETECTED
HPV TYPE 16: NOT DETECTED
HPV TYPE 18: NOT DETECTED

## 2025-07-18 ASSESSMENT — PATIENT HEALTH QUESTIONNAIRE - PHQ9
SUM OF ALL RESPONSES TO PHQ QUESTIONS 1-9: 0
2. FEELING DOWN, DEPRESSED OR HOPELESS: NOT AT ALL
1. LITTLE INTEREST OR PLEASURE IN DOING THINGS: NOT AT ALL
SUM OF ALL RESPONSES TO PHQ QUESTIONS 1-9: 0

## 2025-07-18 NOTE — PROGRESS NOTES
Date: 2025   Name: Malorie Green  YOB: 1970    Chief Complaint   Patient presents with    Other     Pt is here to go over GYNUS results and do an EMB due to PMB. Consent signed.        HPI:  Malorie Green is a 55 y.o. female who presents today for evaluation of TVUS results after an episode of PMB in . TVUS on  shows 5.77 mm EM lining with otherwise normal cavity.    Review of Systems: The following ROS was otherwise negative, except as noted in the HPI: constitutional, HEENT, respiratory, cardiovascular, gastrointestinal, genitourinary, skin, musculoskeletal, neurological, psych    Past Medical History:   Diagnosis Date    Anemia -    Arthritis     Neck    Asthma     PCP - last episode:     Autoimmune disorder     Rheumatoid arthritis    Deep vein thrombosis (HCC)     Diabetes mellitus (HCC)     Type II - follows with PCP    Drug effect     Demerol, secanol, indo in, nubain and sulfa drugs    Ehler's-Danlos syndrome Dx:     PCP    Fibromyalgia     Herpes simplex virus (HSV) infection     Hx of blood clots     Left lower leg - unsure of treatment    Hypertension     Middle cerebral aneurysm 2002    surgery @ [facility]    Migraine     Mitral valve regurgitation     Originally saw Dr. Hernandez, now follows with PCP (updated 2021)    Neurologic disorder     Have not had one so ce i was 12    Overactive bladder     PMB (postmenopausal bleeding)     Raynauds syndrome     Seizures (HCC) 1972    Havent had sonce i was 12    Short-term memory loss     due to aneurysm per pt    Type 2 diabetes mellitus without complication (HCC)     Varicosities      Past Surgical History:   Procedure Laterality Date    ANKLE SURGERY Right 2010    surgery x2    BICEPS TENDON REPAIR Left 1998    in left 5th digit    BRAIN SURGERY  2002    aneurysm repair     SECTION  1996    COLONOSCOPY  2020    Diverticulosis - Dr. Gomez    DILATION AND

## 2025-07-22 ENCOUNTER — HOSPITAL ENCOUNTER (OUTPATIENT)
Dept: PHYSICAL THERAPY | Age: 55
Setting detail: THERAPIES SERIES
Discharge: HOME | End: 2025-07-22
Payer: COMMERCIAL

## 2025-07-22 LAB
GYNECOLOGY CYTOLOGY REPORT: NORMAL
SURGICAL PATHOLOGY REPORT: NORMAL

## 2025-07-22 PROCEDURE — 97110 THERAPEUTIC EXERCISES: CPT

## 2025-07-22 PROCEDURE — 97140 MANUAL THERAPY 1/> REGIONS: CPT

## 2025-07-22 NOTE — FLOWSHEET NOTE
Outpatient Physical Therapy  [place]           [x] Phone: [phone]   Fax: [phone]  [place]           [] Phone: [phone]   Fax: [phone]        Physical Therapy Daily Treatment Note  Date:  2025    Patient Name:  Malorie Green    :  1970  MRN: [id number]  Restrictions/Precautions: No data recorded    Diagnosis:   Foot pain [M79.673] Diagnosis: s/p peroneal tendon repair  Date of Injury/Surgery:   Treatment Diagnosis:  L foot/ankle pain, stiffness.  Insurance/Certification information: [facility]  Referring Physician:  Cornell Rios DPM Dr. Libianco   PCP: Oswaldo Sharp MD  Next Doctor Visit:    Plan of care signed (Y/N):  n  Outcome Measure: LEFS   Visit# / total visits:  2 /   up to 30pcy  Pain level:      610       Goals:     Patient goals: decrease pain, improve standing, walking  Long Term Goals  Time Frame for Long Term Goals : 8 weeks  Long Term Goal 1: I in home program.  Long Term Goal 2: stand/walk 1 hr with minimal pain.  Long Term Goal 3: up/down steps with minimal pain with reciprocal pattern  Long Term Goal 4: wear normal footwear full day with minimal pain.        Summary of Evaluation:  Assessment: Pt presents to PT following L peroneal tendon repair 25. She appears to be healing and recovering well overall.  She wears her boot much of the time, especially later in the day as pain increases.  She is unable to stand/walk bear full weight without footwear.  Ankle motion and strength are limited, stiffness and tightnss presen at foot and ankle.  Edema is mild, incisions are closed and healing. some scabbing still present on medial incision, no signs of infection or drainage.           Subjective:   took a muscle relaxer.  pain in plantar aspect of foot, cramps along the top.          Any changes in Ambulatory Summary Sheet?  None        Objective:     Ternderness at achilles, peroneals, calcaneal tubercles, plantar fascia.     Antalgic gait,

## 2025-07-29 ENCOUNTER — HOSPITAL ENCOUNTER (OUTPATIENT)
Dept: PHYSICAL THERAPY | Age: 55
Setting detail: THERAPIES SERIES
Discharge: HOME | End: 2025-07-29
Payer: COMMERCIAL

## 2025-07-29 PROCEDURE — 97112 NEUROMUSCULAR REEDUCATION: CPT

## 2025-07-29 PROCEDURE — 97110 THERAPEUTIC EXERCISES: CPT

## 2025-07-29 PROCEDURE — 97140 MANUAL THERAPY 1/> REGIONS: CPT

## 2025-07-29 NOTE — FLOWSHEET NOTE
Outpatient Physical Therapy  [place]           [x] Phone: [phone]   Fax: [phone]  [place]           [] Phone: [phone]   Fax: [phone]        Physical Therapy Daily Treatment Note  Date:  2025    Patient Name:  Malorie Green    :  1970  MRN: [id number]  Restrictions/Precautions: No data recorded    Diagnosis:   Foot pain [M79.673] Diagnosis: s/p peroneal tendon repair  Date of Injury/Surgery:   Treatment Diagnosis:  L foot/ankle pain, stiffness.  Insurance/Certification information: [facility]  Referring Physician:  Cornell Rios DPM Dr. Libianco   PCP: Oswaldo Sharp MD  Next Doctor Visit:    Plan of care signed (Y/N):  n  Outcome Measure: LEFS   Visit# / total visits:  3 /   up to 30pcy  Pain level:      610       Goals:     Patient goals: decrease pain, improve standing, walking  Long Term Goals  Time Frame for Long Term Goals : 8 weeks  Long Term Goal 1: I in home program.  Long Term Goal 2: stand/walk 1 hr with minimal pain.  Long Term Goal 3: up/down steps with minimal pain with reciprocal pattern  Long Term Goal 4: wear normal footwear full day with minimal pain.        Summary of Evaluation:  Assessment: Pt presents to PT following L peroneal tendon repair 25. She appears to be healing and recovering well overall.  She wears her boot much of the time, especially later in the day as pain increases.  She is unable to stand/walk bear full weight without footwear.  Ankle motion and strength are limited, stiffness and tightnss presen at foot and ankle.  Edema is mild, incisions are closed and healing. some scabbing still present on medial incision, no signs of infection or drainage.           Subjective:   foot is sore and ankle is swollen.  Had been up on it more.  No longer using cast boot.         Any changes in Ambulatory Summary Sheet?  None        Objective:     Ternderness at achilles, peroneals, calcaneal tubercles, plantar fascia.     Antalgic

## 2025-07-31 ENCOUNTER — HOSPITAL ENCOUNTER (OUTPATIENT)
Dept: PHYSICAL THERAPY | Age: 55
Setting detail: THERAPIES SERIES
Discharge: HOME | End: 2025-07-31
Payer: COMMERCIAL

## 2025-07-31 PROCEDURE — 97110 THERAPEUTIC EXERCISES: CPT

## 2025-07-31 PROCEDURE — 97140 MANUAL THERAPY 1/> REGIONS: CPT

## 2025-07-31 NOTE — FLOWSHEET NOTE
Outpatient Physical Therapy  [place]           [x] Phone: [phone]   Fax: [phone]  [place]           [] Phone: [phone]   Fax: [phone]        Physical Therapy Daily Treatment Note  Date:  2025    Patient Name:  Malorie Green    :  1970  MRN: [id number]  Restrictions/Precautions: No data recorded    Diagnosis:   Foot pain [M79.673] Diagnosis: s/p peroneal tendon repair  Date of Injury/Surgery:   Treatment Diagnosis:  L foot/ankle pain, stiffness.  Insurance/Certification information: [facility]  Referring Physician:  Cornell Rios DPM Dr. Libianco   PCP: Oswaldo Sharp MD  Next Doctor Visit:    Plan of care signed (Y/N):  n  Outcome Measure: LEFS   Visit# / total visits:  4 /   up to 30pcy  Pain level:      8-9/10    whole body   Goals:     Patient goals: decrease pain, improve standing, walking  Long Term Goals  Time Frame for Long Term Goals : 8 weeks  Long Term Goal 1: I in home program.  Long Term Goal 2: stand/walk 1 hr with minimal pain.  Long Term Goal 3: up/down steps with minimal pain with reciprocal pattern  Long Term Goal 4: wear normal footwear full day with minimal pain.        Summary of Evaluation:  Assessment: Pt presents to PT following L peroneal tendon repair 25. She appears to be healing and recovering well overall.  She wears her boot much of the time, especially later in the day as pain increases.  She is unable to stand/walk bear full weight without footwear.  Ankle motion and strength are limited, stiffness and tightnss presen at foot and ankle.  Edema is mild, incisions are closed and healing. some scabbing still present on medial incision, no signs of infection or drainage.           Subjective:    Global body pains, had meds changed and it feels like it is getting worse.   The rainy weather hasn't helped either.          Any changes in Ambulatory Summary Sheet?  Meds changed for RA        Objective:     Ternderness at achilles,

## 2025-08-01 ENCOUNTER — HOSPITAL ENCOUNTER (OUTPATIENT)
Dept: LAB | Age: 55
Discharge: HOME | End: 2025-08-01
Payer: COMMERCIAL

## 2025-08-01 DIAGNOSIS — Z79.899 HIGH RISK MEDICATION USE: ICD-10-CM

## 2025-08-01 LAB
ALBUMIN SERPL-MCNC: 3.9 G/DL (ref 3.4–5)
ALBUMIN/GLOB SERPL: 1.3 {RATIO} (ref 1.1–2.2)
ALP SERPL-CCNC: 102 U/L (ref 40–129)
ALT SERPL-CCNC: 13 U/L (ref 10–40)
ANION GAP SERPL CALCULATED.3IONS-SCNC: 10 MMOL/L (ref 9–17)
AST SERPL-CCNC: 15 U/L (ref 15–37)
BILIRUB SERPL-MCNC: 0.4 MG/DL (ref 0–1)
BUN SERPL-MCNC: 15 MG/DL (ref 7–20)
CALCIUM SERPL-MCNC: 8.7 MG/DL (ref 8.3–10.6)
CHLORIDE SERPL-SCNC: 105 MMOL/L (ref 99–110)
CO2 SERPL-SCNC: 22 MMOL/L (ref 21–32)
CREAT SERPL-MCNC: 0.7 MG/DL (ref 0.6–1.1)
GFR, ESTIMATED: 90 ML/MIN/1.73M2
GLUCOSE SERPL-MCNC: 149 MG/DL (ref 74–99)
POTASSIUM SERPL-SCNC: 3.6 MMOL/L (ref 3.5–5.1)
PROT SERPL-MCNC: 6.8 G/DL (ref 6.4–8.2)
SODIUM SERPL-SCNC: 137 MMOL/L (ref 136–145)

## 2025-08-01 PROCEDURE — 80053 COMPREHEN METABOLIC PANEL: CPT

## 2025-08-04 ENCOUNTER — OFFICE VISIT (OUTPATIENT)
Age: 55
End: 2025-08-04
Payer: COMMERCIAL

## 2025-08-04 VITALS
DIASTOLIC BLOOD PRESSURE: 82 MMHG | BODY MASS INDEX: 37.04 KG/M2 | SYSTOLIC BLOOD PRESSURE: 122 MMHG | WEIGHT: 222.6 LBS | HEART RATE: 74 BPM | OXYGEN SATURATION: 98 %

## 2025-08-04 DIAGNOSIS — M25.50 POLYARTHRALGIA: ICD-10-CM

## 2025-08-04 DIAGNOSIS — Z79.899 HIGH RISK MEDICATION USE: ICD-10-CM

## 2025-08-04 DIAGNOSIS — M79.7 FIBROMYALGIA: ICD-10-CM

## 2025-08-04 DIAGNOSIS — M06.09 RHEUMATOID ARTHRITIS OF MULTIPLE SITES WITH NEGATIVE RHEUMATOID FACTOR (HCC): Primary | ICD-10-CM

## 2025-08-04 PROCEDURE — 99214 OFFICE O/P EST MOD 30 MIN: CPT | Performed by: STUDENT IN AN ORGANIZED HEALTH CARE EDUCATION/TRAINING PROGRAM

## 2025-08-04 PROCEDURE — 99215 OFFICE O/P EST HI 40 MIN: CPT | Performed by: STUDENT IN AN ORGANIZED HEALTH CARE EDUCATION/TRAINING PROGRAM

## 2025-08-04 PROCEDURE — G8417 CALC BMI ABV UP PARAM F/U: HCPCS | Performed by: STUDENT IN AN ORGANIZED HEALTH CARE EDUCATION/TRAINING PROGRAM

## 2025-08-04 PROCEDURE — G8427 DOCREV CUR MEDS BY ELIG CLIN: HCPCS | Performed by: STUDENT IN AN ORGANIZED HEALTH CARE EDUCATION/TRAINING PROGRAM

## 2025-08-04 PROCEDURE — 3017F COLORECTAL CA SCREEN DOC REV: CPT | Performed by: STUDENT IN AN ORGANIZED HEALTH CARE EDUCATION/TRAINING PROGRAM

## 2025-08-04 PROCEDURE — 1036F TOBACCO NON-USER: CPT | Performed by: STUDENT IN AN ORGANIZED HEALTH CARE EDUCATION/TRAINING PROGRAM

## 2025-08-04 RX ORDER — METHYLPREDNISOLONE 4 MG/1
TABLET ORAL
Qty: 21 TABLET | Refills: 0 | Status: SHIPPED | OUTPATIENT
Start: 2025-08-04 | End: 2025-08-10

## 2025-08-04 RX ORDER — METHOTREXATE 2.5 MG/1
20 TABLET ORAL WEEKLY
Qty: 104 TABLET | Refills: 0 | Status: SHIPPED | OUTPATIENT
Start: 2025-08-04

## 2025-08-04 RX ORDER — MULTIVIT WITH MINERALS/LUTEIN
250 TABLET ORAL DAILY
COMMUNITY

## 2025-08-04 RX ORDER — FOLIC ACID 1 MG/1
1000 TABLET ORAL DAILY
Qty: 90 TABLET | Refills: 0 | Status: SHIPPED | OUTPATIENT
Start: 2025-08-04

## 2025-08-05 ENCOUNTER — HOSPITAL ENCOUNTER (OUTPATIENT)
Dept: PHYSICAL THERAPY | Age: 55
Setting detail: THERAPIES SERIES
Discharge: HOME | End: 2025-08-05
Payer: COMMERCIAL

## 2025-08-05 PROCEDURE — 97110 THERAPEUTIC EXERCISES: CPT

## 2025-08-05 PROCEDURE — 97112 NEUROMUSCULAR REEDUCATION: CPT

## 2025-08-05 PROCEDURE — 97140 MANUAL THERAPY 1/> REGIONS: CPT

## 2025-08-07 ENCOUNTER — HOSPITAL ENCOUNTER (OUTPATIENT)
Dept: PHYSICAL THERAPY | Age: 55
Setting detail: THERAPIES SERIES
Discharge: HOME | End: 2025-08-07
Payer: COMMERCIAL

## 2025-08-07 PROCEDURE — 97112 NEUROMUSCULAR REEDUCATION: CPT

## 2025-08-07 PROCEDURE — 97140 MANUAL THERAPY 1/> REGIONS: CPT

## 2025-08-07 PROCEDURE — 97110 THERAPEUTIC EXERCISES: CPT

## 2025-08-12 ENCOUNTER — HOSPITAL ENCOUNTER (OUTPATIENT)
Dept: PHYSICAL THERAPY | Age: 55
Setting detail: THERAPIES SERIES
Discharge: HOME | End: 2025-08-12
Payer: COMMERCIAL

## 2025-08-12 PROCEDURE — 97140 MANUAL THERAPY 1/> REGIONS: CPT

## 2025-08-12 PROCEDURE — 97110 THERAPEUTIC EXERCISES: CPT

## 2025-08-14 ENCOUNTER — HOSPITAL ENCOUNTER (OUTPATIENT)
Dept: PHYSICAL THERAPY | Age: 55
Setting detail: THERAPIES SERIES
Discharge: HOME | End: 2025-08-14
Payer: COMMERCIAL

## 2025-08-14 PROCEDURE — 97140 MANUAL THERAPY 1/> REGIONS: CPT

## 2025-08-14 PROCEDURE — 97110 THERAPEUTIC EXERCISES: CPT

## 2025-08-14 PROCEDURE — 97112 NEUROMUSCULAR REEDUCATION: CPT

## 2025-08-26 ENCOUNTER — HOSPITAL ENCOUNTER (OUTPATIENT)
Dept: PHYSICAL THERAPY | Age: 55
Setting detail: THERAPIES SERIES
Discharge: HOME | End: 2025-08-26
Payer: COMMERCIAL

## 2025-08-26 PROCEDURE — 97110 THERAPEUTIC EXERCISES: CPT

## 2025-08-26 PROCEDURE — 97140 MANUAL THERAPY 1/> REGIONS: CPT

## (undated) DEVICE — Z INACTIVE USE 2660664 SOLUTION IRRIG 3000ML 0.9% SOD CHL USP UROMATIC PLAS CONT

## (undated) DEVICE — DRESSING PETRO W3XL3IN OIL EMUL N ADH GZ KNIT IMPREG CELOS

## (undated) DEVICE — TUBING, SUCTION, 9/32" X 10', STRAIGHT: Brand: MEDLINE

## (undated) DEVICE — SLEEVE TRAC SPANDEX LAT W/ 4IN COBAN SUPERFICIAL RAD NRV PD

## (undated) DEVICE — GAUZE,SPONGE,4"X4",16PLY,XRAY,STRL,LF: Brand: MEDLINE

## (undated) DEVICE — MAT FLOOR ULTRA ABS 28X48IN

## (undated) DEVICE — 3M™ STERI-DRAPE™ U-DRAPE 1067 1067 5/BX 4BX/CS/CTN&#X20;: Brand: STERI-DRAPE™

## (undated) DEVICE — SHOULDER PK

## (undated) DEVICE — SPONGE GZ W4XL8IN COT WVN 12 PLY

## (undated) DEVICE — COUNTER NDL 30 COUNT FOAM STRP SGL MAG

## (undated) DEVICE — AMBIENT SUPER TURBOVAC 90: Brand: COBLATION

## (undated) DEVICE — SLING ARM L L165IN D75IN WHT POLY MESH ENVELOP MTL SIDE

## (undated) DEVICE — WEREWOLF FLOW 90 COBLATION WAND: Brand: COBLATION

## (undated) DEVICE — SUTURE ETHLN SZ 3-0 L30IN NONABSORBABLE BLK FS-1 L24MM 3/8 669H

## (undated) DEVICE — TOWEL,OR,DSP,ST,BLUE,STD,6/PK,12PK/CS: Brand: MEDLINE

## (undated) DEVICE — [AGGRESSIVE 6-FLUTE BARREL BUR, ARTHROSCOPIC SHAVER BLADE,  DO NOT RESTERILIZE,  DO NOT USE IF PACKAGE IS DAMAGED,  KEEP DRY,  KEEP AWAY FROM SUNLIGHT]: Brand: FORMULA

## (undated) DEVICE — PAD,ABDOMINAL,5"X9",ST,LF,25/BX: Brand: MEDLINE INDUSTRIES, INC.

## (undated) DEVICE — TUBING PMP L16FT MAIN DISP FOR AR-6400 AR-6475

## (undated) DEVICE — APPLICATOR MEDICATED 26 CC SOLUTION HI LT ORNG CHLORAPREP

## (undated) DEVICE — [AGGRESSIVE PLUS CUTTER, ARTHROSCOPIC SHAVER BLADE,  DO NOT RESTERILIZE,  DO NOT USE IF PACKAGE IS DAMAGED,  KEEP DRY,  KEEP AWAY FROM SUNLIGHT]: Brand: FORMULA